# Patient Record
Sex: FEMALE | Race: WHITE | Employment: OTHER | URBAN - METROPOLITAN AREA
[De-identification: names, ages, dates, MRNs, and addresses within clinical notes are randomized per-mention and may not be internally consistent; named-entity substitution may affect disease eponyms.]

---

## 2017-07-17 ENCOUNTER — TRANSFERRED RECORDS (OUTPATIENT)
Dept: HEALTH INFORMATION MANAGEMENT | Facility: CLINIC | Age: 77
End: 2017-07-17

## 2017-10-24 ENCOUNTER — DOCUMENTATION ONLY (OUTPATIENT)
Dept: OTHER | Facility: CLINIC | Age: 77
End: 2017-10-24

## 2017-10-24 PROBLEM — Z71.89 ADVANCED DIRECTIVES, COUNSELING/DISCUSSION: Chronic | Status: ACTIVE | Noted: 2017-10-24

## 2017-11-06 DIAGNOSIS — R60.0 BILATERAL EDEMA OF LOWER EXTREMITY: ICD-10-CM

## 2017-11-06 RX ORDER — HYDROCHLOROTHIAZIDE 50 MG/1
50 TABLET ORAL DAILY
Qty: 90 TABLET | Refills: 0 | Status: SHIPPED | OUTPATIENT
Start: 2017-11-06 | End: 2017-11-20

## 2017-11-06 NOTE — TELEPHONE ENCOUNTER
(Reason for Call:  Medication or medication refill:    Do you use a [facility]?  Name of the pharmacy and phone number for the current request:  [facility]    Name of the medication requested: Hydrochlorothiazide    Other request: has appt 11/20    Can we leave a detailed message on this number? YES    Phone number patient can be reached at: Home number on file [phone] (home)    Best Time: any    Call taken on 11/6/2017 at 10:01 AM by Rebeca Cortés

## 2017-11-06 NOTE — TELEPHONE ENCOUNTER
Next 5 appointments (look out 90 days)     Nov 20, 2017  9:40 AM CST   SHORT with Jazzy Santiago MD   [facility]    [address]  [facility] [id number] [phone]                Prescription approved per G Refill Protocol.

## 2017-11-20 ENCOUNTER — OFFICE VISIT (OUTPATIENT)
Dept: INTERNAL MEDICINE | Facility: CLINIC | Age: 77
End: 2017-11-20
Payer: COMMERCIAL

## 2017-11-20 VITALS
HEIGHT: 64 IN | DIASTOLIC BLOOD PRESSURE: 76 MMHG | OXYGEN SATURATION: 97 % | BODY MASS INDEX: 27.83 KG/M2 | TEMPERATURE: 98.2 F | HEART RATE: 89 BPM | WEIGHT: 163 LBS | SYSTOLIC BLOOD PRESSURE: 120 MMHG

## 2017-11-20 DIAGNOSIS — I10 ESSENTIAL HYPERTENSION, BENIGN: Primary | ICD-10-CM

## 2017-11-20 DIAGNOSIS — R63.5 WEIGHT GAIN: ICD-10-CM

## 2017-11-20 DIAGNOSIS — Z78.0 MENOPAUSE: ICD-10-CM

## 2017-11-20 DIAGNOSIS — R60.0 BILATERAL EDEMA OF LOWER EXTREMITY: ICD-10-CM

## 2017-11-20 DIAGNOSIS — Z13.6 CARDIOVASCULAR SCREENING; LDL GOAL LESS THAN 160: ICD-10-CM

## 2017-11-20 LAB
BASOPHILS # BLD AUTO: 0 10E9/L (ref 0–0.2)
BASOPHILS NFR BLD AUTO: 0.3 %
DIFFERENTIAL METHOD BLD: ABNORMAL
EOSINOPHIL # BLD AUTO: 0.2 10E9/L (ref 0–0.7)
EOSINOPHIL NFR BLD AUTO: 2.6 %
ERYTHROCYTE [DISTWIDTH] IN BLOOD BY AUTOMATED COUNT: 13.5 % (ref 10–15)
HCT VFR BLD AUTO: 43 % (ref 35–47)
HGB BLD-MCNC: 14.2 G/DL (ref 11.7–15.7)
LYMPHOCYTES # BLD AUTO: 2.2 10E9/L (ref 0.8–5.3)
LYMPHOCYTES NFR BLD AUTO: 33.6 %
MCH RBC QN AUTO: 33.2 PG (ref 26.5–33)
MCHC RBC AUTO-ENTMCNC: 33 G/DL (ref 31.5–36.5)
MCV RBC AUTO: 101 FL (ref 78–100)
MONOCYTES # BLD AUTO: 0.4 10E9/L (ref 0–1.3)
MONOCYTES NFR BLD AUTO: 6.8 %
NEUTROPHILS # BLD AUTO: 3.6 10E9/L (ref 1.6–8.3)
NEUTROPHILS NFR BLD AUTO: 56.7 %
PLATELET # BLD AUTO: 243 10E9/L (ref 150–450)
RBC # BLD AUTO: 4.28 10E12/L (ref 3.8–5.2)
WBC # BLD AUTO: 6.4 10E9/L (ref 4–11)

## 2017-11-20 PROCEDURE — 99213 OFFICE O/P EST LOW 20 MIN: CPT | Performed by: INTERNAL MEDICINE

## 2017-11-20 PROCEDURE — 80061 LIPID PANEL: CPT | Performed by: INTERNAL MEDICINE

## 2017-11-20 PROCEDURE — 80050 GENERAL HEALTH PANEL: CPT | Performed by: INTERNAL MEDICINE

## 2017-11-20 PROCEDURE — 36415 COLL VENOUS BLD VENIPUNCTURE: CPT | Performed by: INTERNAL MEDICINE

## 2017-11-20 RX ORDER — HYDROCHLOROTHIAZIDE 50 MG/1
50 TABLET ORAL DAILY
Qty: 90 TABLET | Refills: 4 | Status: SHIPPED | OUTPATIENT
Start: 2017-11-20 | End: 2019-02-05

## 2017-11-20 NOTE — NURSING NOTE
"Chief Complaint   Patient presents with     Hypertension     does PX with Ob/GYN     Recheck Medication       Initial /76 (BP Location: Left arm, Cuff Size: Adult Regular)  Pulse 89  Temp 98.2  F (36.8  C) (Oral)  Ht 5' 4.25\" (1.632 m)  Wt 163 lb (73.9 kg)  SpO2 97%  Breastfeeding? No  BMI 27.76 kg/m2 Estimated body mass index is 27.76 kg/(m^2) as calculated from the following:    Height as of this encounter: 5' 4.25\" (1.632 m).    Weight as of this encounter: 163 lb (73.9 kg).  Medication Reconciliation: complete   Nay Schmitt CMA      "

## 2017-11-20 NOTE — MR AVS SNAPSHOT
"              After Visit Summary   11/20/2017    Nubia Coronel    MRN: [id number]           Patient Information     Date Of Birth          1940        Visit Information        Provider Department      11/20/2017 9:40 AM Jazzy Santiago MD [facility]        Today's Diagnoses     Essential hypertension, benign    -  1    Bilateral edema of lower extremity        CARDIOVASCULAR SCREENING; LDL GOAL LESS THAN 160        Weight gain        Menopause           Follow-ups after your visit        Your next 10 appointments already scheduled     Dec 07, 2017  9:30 AM ROBERT ROSALES SCREENING BILATERAL W/ ALECIA with RHBCMA2   [facility]    [address] [facility], [address]  [facility] [id number] [phone]           Three-dimensional (3D) mammograms are available at [place] locations in [place], [facility], [facility], [facility], and [place]. -Health locations include [facility] in [place]. Benefits of 3D mammograms include: - Improved rate of cancer detection - Decreases your chance of having to go back for more tests, which means fewer: - \"False-positive\" results (This means that there is an abnormal area but it isn't cancer.) - Invasive testing procedures, such as a biopsy or surgery - Can provide clearer images of the breast if you have dense breast tissue. 3D mammography is an optional exam that anyone can have with a 2D mammogram. It doesn't replace or take the place of a 2D mammogram. 2D mammograms remain an effective screening test for all women.  Not all insurance companies cover the cost of a 3D mammogram. Check with your insurance.              Future tests that were ordered for you today     Open Future Orders        Priority Expected Expires Ordered    DX Hip/Pelvis/Spine Routine  11/20/2018 11/20/2017            Who to contact     If you have questions or need follow up " "information about today's clinic visit or your schedule please contact [facility] directly at [phone].  Normal or non-critical lab and imaging results will be communicated to you by MyChart, letter or phone within 4 business days after the clinic has received the results. If you do not hear from us within 7 days, please contact the clinic through [employer] or phone. If you have a critical or abnormal lab result, we will notify you by phone as soon as possible.  Submit refill requests through [employer] or call your pharmacy and they will forward the refill request to us. Please allow 3 business days for your refill to be completed.          Additional Information About Your Visit        Mood[employer] Information     [employer] gives you secure access to your electronic health record. If you see a primary care provider, you can also send messages to your care team and make appointments. If you have questions, please call your primary care clinic.  If you do not have a primary care provider, please call [phone] and they will assist you.        Care EveryWhere ID     This is your Care EveryWhere ID. This could be used by other organizations to access your [place] medical records  [id number]        Your Vitals Were     Pulse Temperature Height Pulse Oximetry Breastfeeding? BMI (Body Mass Index)    89 98.2  F (36.8  C) (Oral) 5' 4.25\" (1.632 m) 97% No 27.76 kg/m2       Blood Pressure from Last 3 Encounters:   11/20/17 120/76   11/15/16 122/58   10/10/16 114/68    Weight from Last 3 Encounters:   11/20/17 163 lb (73.9 kg)   11/15/16 148 lb 8 oz (67.4 kg)   10/10/16 154 lb (69.9 kg)              We Performed the Following     CBC with platelets differential     Comprehensive metabolic panel     Lipid panel reflex to direct LDL Fasting     TSH with free T4 reflex          Today's Medication Changes          These changes are accurate as of: 11/20/17 10:22 AM.  If you have any questions, ask your nurse or " doctor.               These medicines have changed or have updated prescriptions.        Dose/Directions    OMEGA-3 FATTY ACIDS PO   This may have changed:  Another medication with the same name was removed. Continue taking this medication, and follow the directions you see here.   Changed by:  Jazzy Santiago MD        Dose:  4000 mg   Take 4,000 mg by mouth 3 times daily   Refills:  0            Where to get your medicines      These medications were sent to [place] [address] [place] [facility] [address] [facility]  [address] [facility] [address]    Hours:  Tech issues with their phone system Phone:  [phone]     hydrochlorothiazide 50 MG tablet                Primary Care Provider Office Phone # Fax #    Jazzy Santiago [phone]       [address] [facility] [address]        Equal Access to Services     CASEY FIGUEROA : Hadii nannette zuniga hadasho Soomaali, waaxda luqadaha, qaybta kaalmada adeegyada, waxmartin iniguez . So [facility] [phone].    ATENCIÓN: Si habla español, tiene a platt disposición servicios gratuitos de asistencia lingüística. Ll[facility] [phone].    We comply with applicable federal civil rights laws and [place] laws. We do not discriminate on the basis of race, color, national origin, age, disability, sex, sexual orientation, or gender identity.            Thank you!     Thank you for choosing [facility]  for your care. Our goal is always to provide you with excellent care. Hearing back from our patients is one way we can continue to improve our services. Please take a few minutes to complete the written survey that you may receive in the mail after your visit with us. Thank you!             Your Updated Medication List - Protect others around you: Learn how to safely use, store and throw away your medicines at www.disposemymeds.org.          This list is accurate as of: 11/20/17 10:22 AM.  Always use your most  recent med list.                   Brand Name Dispense Instructions for use Diagnosis    ALEVE 220 MG tablet   Generic drug:  naproxen sodium     60    1 TABLET EVERY 12 HOURS AS NEEDED    Routine general medical examination at a health care facility, Screening for malignant neoplasm of the rectum, Myalgia and myositis, unspecified       Alpha-Lipoic Acid 300 MG Tabs      Take by mouth 3 times daily.        AMBIEN 5 MG tablet   Generic drug:  zolpidem      1 TABLET AT BEDTIME 1 tab later in night prn        cetirizine 10 MG tablet    zyrTEC     Take 10 mg by mouth daily        cholecalciferol 1000 UNIT tablet    vitamin D3     1 daily.    Preop general physical exam       Esomeprazole Magnesium 20 MG Tbec    NEXIUM 24HR    90 tablet    Take 1 capsule by mouth daily    Essential hypertension with goal blood pressure less than 130/85       FIORICET -40 MG per tablet   Generic drug:  butalbital-acetaminophen-caffeine     28    1 OR 2 TABLETS EVERY 4 HOURS AS NEEDED    Headache(784.0)       hydrochlorothiazide 50 MG tablet    HYDRODIURIL    90 tablet    Take 1 tablet (50 mg) by mouth daily    Bilateral edema of lower extremity       Levocarnitine 500 MG Caps      Take  by mouth. TID        Magnesium 400 MG Tabs      Take by mouth daily        OMEGA-3 FATTY ACIDS PO      Take 4,000 mg by mouth 3 times daily        QC WOMENS DAILY MULTIVITAMIN PO      Take by mouth 2 times daily        TOPAMAX 100 MG tablet   Generic drug:  topiramate      Take  by mouth. 25 mg am. 75 mg pm.        TUMS 500 OR      2 tabs q pm    Preop general physical exam       ULTRAM 50 MG tablet   Generic drug:  traMADol      1 tab bid

## 2017-11-20 NOTE — PROGRESS NOTES
"  SUBJECTIVE:   Nubia Coronel is a 76 year old female who   presents to clinic today for the following health issues:      Hypertension Follow-up & med check      Outpatient blood pressures are not being checked.    Low Salt Diet: low salt      Amount of exercise or physical activity: water aerobics and stretching daily    Problems taking medications regularly: No    Medication side effects: none    Diet: regular (no restrictions)      Fibromyalgia.  Exercises- water aerobics.  Takes tramadol twice per day. She takes vitamin D regularly.  She has been undergoing physical therapy since June/July.     Headaches. She has headaches 1-2 times per month. Takes topamax and takes fioricet as needed.       Problem list and histories reviewed & adjusted, as indicated.    Reviewed and updated as needed this visit by clinical staffTobacco  Allergies  Med Hx  Surg Hx  Fam Hx  Soc Hx      Reviewed and updated as needed this visit by Provider         ROS:  C: NEGATIVE for fever, chills, change in weight  R: NEGATIVE for significant cough or SOB  CV: NEGATIVE for chest pain, palpitations or peripheral edema  Neuro: POS headaches    OBJECTIVE:     /76 (BP Location: Left arm, Cuff Size: Adult Regular)  Pulse 89  Temp 98.2  F (36.8  C) (Oral)  Ht 5' 4.25\" (1.632 m)  Wt 163 lb (73.9 kg)  SpO2 97%  Breastfeeding? No  BMI 27.76 kg/m2  Body mass index is 27.76 kg/(m^2).  GENERAL: healthy, alert and no distress  RESP: lungs clear to auscultation - no rales, rhonchi or wheezes  CV: regular rate and rhythm, normal S1 S2, no S3 or S4, no murmur, click or rub      ASSESSMENT/PLAN:       (I10) Essential hypertension, benign  (primary encounter diagnosis)  Comment: at goal  Plan: Comprehensive metabolic panel, CBC with         platelets differential            (R60.0) Bilateral edema of lower extremity  Comment:   Plan: hydrochlorothiazide (HYDRODIURIL) 50 MG tablet,        Comprehensive metabolic panel        "     (Z13.6) CARDIOVASCULAR SCREENING; LDL GOAL LESS THAN 160  Comment:   Plan: Lipid panel reflex to direct LDL Fasting          (R63.5) Weight gain  Comment:  Plan: TSH with free T4 reflex            (Z78.0) Menopause  Comment:   Plan: DX Hip/Pelvis/Spine                Jazzy Santiago MD  [facility]

## 2017-11-21 LAB
ALBUMIN SERPL-MCNC: 3.7 G/DL (ref 3.4–5)
ALP SERPL-CCNC: 103 U/L (ref 40–150)
ALT SERPL W P-5'-P-CCNC: 115 U/L (ref 0–50)
ANION GAP SERPL CALCULATED.3IONS-SCNC: 6 MMOL/L (ref 3–14)
AST SERPL W P-5'-P-CCNC: 60 U/L (ref 0–45)
BILIRUB SERPL-MCNC: 0.4 MG/DL (ref 0.2–1.3)
BUN SERPL-MCNC: 21 MG/DL (ref 7–30)
CALCIUM SERPL-MCNC: 9.5 MG/DL (ref 8.5–10.1)
CHLORIDE SERPL-SCNC: 102 MMOL/L (ref 94–109)
CHOLEST SERPL-MCNC: 188 MG/DL
CO2 SERPL-SCNC: 31 MMOL/L (ref 20–32)
CREAT SERPL-MCNC: 0.74 MG/DL (ref 0.52–1.04)
GFR SERPL CREATININE-BSD FRML MDRD: 76 ML/MIN/1.7M2
GLUCOSE SERPL-MCNC: 148 MG/DL (ref 70–99)
HDLC SERPL-MCNC: 71 MG/DL
LDLC SERPL CALC-MCNC: 95 MG/DL
NONHDLC SERPL-MCNC: 117 MG/DL
POTASSIUM SERPL-SCNC: 3.6 MMOL/L (ref 3.4–5.3)
PROT SERPL-MCNC: 7.2 G/DL (ref 6.8–8.8)
SODIUM SERPL-SCNC: 139 MMOL/L (ref 133–144)
TRIGL SERPL-MCNC: 109 MG/DL
TSH SERPL DL<=0.005 MIU/L-ACNC: 0.96 MU/L (ref 0.4–4)

## 2017-11-25 ENCOUNTER — TELEPHONE (OUTPATIENT)
Dept: INTERNAL MEDICINE | Facility: CLINIC | Age: 77
End: 2017-11-25

## 2017-11-25 DIAGNOSIS — R79.89 ELEVATED LIVER FUNCTION TESTS: Primary | ICD-10-CM

## 2017-11-25 DIAGNOSIS — R73.09 ELEVATED GLUCOSE: ICD-10-CM

## 2017-11-26 NOTE — TELEPHONE ENCOUNTER
Please call the patient to let her know that I would like the liver function tests repeated, as this was elevated.    Also would like to check for diabetes with a hemoglobin A1c.    Please have her schedule for repeat labs over the next 1-2 weeks.

## 2017-11-27 NOTE — TELEPHONE ENCOUNTER
Contacted pt, informed her of elevated LFT and glucose. Needs to recheck LFT and have testing done for diabetes. Lab only appt scheduled for 12/12/17.

## 2017-12-07 ENCOUNTER — HOSPITAL ENCOUNTER (OUTPATIENT)
Dept: MAMMOGRAPHY | Facility: CLINIC | Age: 77
Discharge: HOME | End: 2017-12-07
Attending: OBSTETRICS & GYNECOLOGY | Admitting: OBSTETRICS & GYNECOLOGY
Payer: MEDICARE

## 2017-12-07 DIAGNOSIS — Z12.31 VISIT FOR SCREENING MAMMOGRAM: ICD-10-CM

## 2017-12-07 PROCEDURE — G0202 SCR MAMMO BI INCL CAD: HCPCS

## 2017-12-12 ENCOUNTER — RADIANT APPOINTMENT (OUTPATIENT)
Dept: BONE DENSITY | Facility: CLINIC | Age: 77
End: 2017-12-12
Payer: COMMERCIAL

## 2017-12-12 PROCEDURE — 77080 DXA BONE DENSITY AXIAL: CPT | Performed by: INTERNAL MEDICINE

## 2017-12-14 DIAGNOSIS — R79.89 ELEVATED LIVER FUNCTION TESTS: ICD-10-CM

## 2017-12-14 DIAGNOSIS — R73.09 ELEVATED GLUCOSE: ICD-10-CM

## 2017-12-14 LAB
ALT SERPL W P-5'-P-CCNC: 119 U/L (ref 0–50)
AST SERPL W P-5'-P-CCNC: 64 U/L (ref 0–45)
HBA1C MFR BLD: 6.2 % (ref 4.3–6)

## 2017-12-14 PROCEDURE — 84450 TRANSFERASE (AST) (SGOT): CPT | Performed by: INTERNAL MEDICINE

## 2017-12-14 PROCEDURE — 83036 HEMOGLOBIN GLYCOSYLATED A1C: CPT | Performed by: INTERNAL MEDICINE

## 2017-12-14 PROCEDURE — 84460 ALANINE AMINO (ALT) (SGPT): CPT | Performed by: INTERNAL MEDICINE

## 2017-12-14 PROCEDURE — 36415 COLL VENOUS BLD VENIPUNCTURE: CPT | Performed by: INTERNAL MEDICINE

## 2017-12-31 ENCOUNTER — TELEPHONE (OUTPATIENT)
Dept: INTERNAL MEDICINE | Facility: CLINIC | Age: 77
End: 2017-12-31

## 2017-12-31 DIAGNOSIS — R73.9 HYPERGLYCEMIA: ICD-10-CM

## 2017-12-31 DIAGNOSIS — R79.89 ELEVATED LIVER FUNCTION TESTS: Primary | ICD-10-CM

## 2017-12-31 NOTE — LETTER
[place] [address]  [facility] [address]  [phone]        November 29, 2018    Nubia Coronel  [address] [facility] [address]              Dear Nubia Coronel    This is to remind you that your NON-FASTING LAB is due.    You may call our office at [phone] to schedule an appointment.    Please disregard this notice if you have already had your labs drawn or made an appointment.        Sincerely,        Jazzy Santiago MD

## 2018-01-01 NOTE — TELEPHONE ENCOUNTER
The liver function tests are still elevated.  Recommend obtaining an ultrasound of the liver.  Also recommend assessing for hepatitis C- lab only appt

## 2018-01-16 NOTE — TELEPHONE ENCOUNTER
Patient's home number message on machine to call back.  Patient's cell number message on machine to call back.

## 2018-01-17 NOTE — TELEPHONE ENCOUNTER
Pt calls back, states she is currently in [place], [place] until May. Reports she doesn't have a PCP in [place]. Indicates many of the providers down in [place] avoid seasonal patients unless urgent.     Please advise, thanks.    Please note that pt needs call back on cell number as home number is back her in [place].

## 2018-01-18 NOTE — TELEPHONE ENCOUNTER
Would she be able to find out from insurance if she could just get labs and have them faxed to me

## 2018-01-19 NOTE — TELEPHONE ENCOUNTER
Patient would like us to mail her lab orders to her [place] address at: [address] [place] [address].  Just Hep C.?   She will contact us for lab results if she does not hear back within 7-10 days of blood draw.  She agrees to follow up with liver US in May when she returns to [place] or sooner in [place] if needed.   Patient was concerned LFT's may be elevated due to Tylenol use for HA's. Was drinking maybe one glass of wine per day.  Currently not drinking or taking Tylenol and agrees to abstain from both.

## 2018-05-14 ENCOUNTER — TELEPHONE (OUTPATIENT)
Dept: INTERNAL MEDICINE | Facility: CLINIC | Age: 78
End: 2018-05-14

## 2018-05-14 NOTE — LETTER
[facility]  [address], [address]  [place], [place]  [address]                                            TEL:[phone]  FAX:[phone]        May 14, 2018    Attn: [facility] Medical Records  Re: Nubia Coronel, 1940    Please send lab results for Hemoglobin A1c, Hepatic Panel and Hepatitis C screen STAT via fax  to [phone], Attn: OLGA Thornton.     Labs were ordered by Dr. Jazzy Santiago and carried by patient to the lab. Please notify our office if Nubia did not have labs drawn at your Healthcare facility.     Sincerely,        Hillary Caputo RN   For Jazzy Santiago M.D.

## 2018-05-14 NOTE — TELEPHONE ENCOUNTER
Patient calls, Dr. Santiago ordered some labs for her to have drawn while in [place] and was to have another test when she got back. Patient also never heard from our office regarding the labs she had drawn. Patient contacted [facility] [phone] for the labs and they directed her to another location for them, patient not sure if the lab was part of their healthcare group or not. This RN will call [facility] to find out if they have lab results or where they would have directed the patient to. Patient will call back if she finds the name/phone number of the lab she went to.    Per 12/31/17 telephone encounter, Dr. Santiago wanted her to have labs and liver u/s done. Patient was already in [place] and planned to do the liver u/s when she returned. Call transferred to Radiology scheduling to make an appointment.    Contacted [facility] Medical Records Dept and spoke to Chelsy, they do not send lab results out without a GUILLE and cannot confirm if the patient had labs drawn there. Since we are a doctor's office we can send a STAT request for records to them for this. Fax: [phone]. Letter sent to [facility] Medical Records requesting they send results to us STAT and call if patient did not have labs drawn at their healthcare facility.

## 2018-05-17 ENCOUNTER — HOSPITAL ENCOUNTER (OUTPATIENT)
Dept: ULTRASOUND IMAGING | Facility: CLINIC | Age: 78
Discharge: HOME | End: 2018-05-17
Attending: INTERNAL MEDICINE | Admitting: INTERNAL MEDICINE
Payer: MEDICARE

## 2018-05-17 DIAGNOSIS — R79.89 ELEVATED LIVER FUNCTION TESTS: ICD-10-CM

## 2018-05-17 PROCEDURE — 76700 US EXAM ABDOM COMPLETE: CPT

## 2018-07-02 ENCOUNTER — TRANSFERRED RECORDS (OUTPATIENT)
Dept: HEALTH INFORMATION MANAGEMENT | Facility: CLINIC | Age: 78
End: 2018-07-02

## 2018-07-25 ENCOUNTER — OFFICE VISIT (OUTPATIENT)
Dept: INTERNAL MEDICINE | Facility: CLINIC | Age: 78
End: 2018-07-25
Payer: COMMERCIAL

## 2018-07-25 VITALS
BODY MASS INDEX: 26.98 KG/M2 | OXYGEN SATURATION: 95 % | HEART RATE: 82 BPM | TEMPERATURE: 98.7 F | DIASTOLIC BLOOD PRESSURE: 64 MMHG | WEIGHT: 158 LBS | SYSTOLIC BLOOD PRESSURE: 116 MMHG | HEIGHT: 64 IN | RESPIRATION RATE: 14 BRPM

## 2018-07-25 DIAGNOSIS — M79.7 FIBROMYALGIA: ICD-10-CM

## 2018-07-25 DIAGNOSIS — R79.89 ELEVATED LIVER FUNCTION TESTS: Primary | ICD-10-CM

## 2018-07-25 PROCEDURE — 36415 COLL VENOUS BLD VENIPUNCTURE: CPT | Performed by: INTERNAL MEDICINE

## 2018-07-25 PROCEDURE — 80076 HEPATIC FUNCTION PANEL: CPT | Performed by: INTERNAL MEDICINE

## 2018-07-25 PROCEDURE — 99213 OFFICE O/P EST LOW 20 MIN: CPT | Performed by: INTERNAL MEDICINE

## 2018-07-25 NOTE — MR AVS SNAPSHOT
"              After Visit Summary   7/25/2018    Nubia Coronel    MRN: [id number]           Patient Information     Date Of Birth          1940        Visit Information        Provider Department      7/25/2018 10:20 AM Jazzy Santiago MD [facility]        Today's Diagnoses     Elevated liver function tests    -  1    Fibromyalgia          Care Instructions    magnesium          Follow-ups after your visit        Who to contact     If you have questions or need follow up information about today's clinic visit or your schedule please contact [facility] directly at [phone].  Normal or non-critical lab and imaging results will be communicated to you by [employer], letter or phone within 4 business days after the clinic has received the results. If you do not hear from us within 7 days, please contact the clinic through [employer] or phone. If you have a critical or abnormal lab result, we will notify you by phone as soon as possible.  Submit refill requests through [employer] or call your pharmacy and they will forward the refill request to us. Please allow 3 business days for your refill to be completed.          Additional Information About Your Visit        MyChart Information     [employer] gives you secure access to your electronic health record. If you see a primary care provider, you can also send messages to your care team and make appointments. If you have questions, please call your primary care clinic.  If you do not have a primary care provider, please call [phone] and they will assist you.        Care EveryWhere ID     This is your Care EveryWhere ID. This could be used by other organizations to access your [place] medical records  [id number]        Your Vitals Were     Pulse Temperature Respirations Height Pulse Oximetry BMI (Body Mass Index)    82 98.7  F (37.1  C) (Oral) 14 5' 4.25\" (1.632 m) 95% 26.91 kg/m2       Blood Pressure from Last 3 " Encounters:   07/25/18 116/64   11/20/17 120/76   11/15/16 122/58    Weight from Last 3 Encounters:   07/25/18 158 lb (71.7 kg)   11/20/17 163 lb (73.9 kg)   11/15/16 148 lb 8 oz (67.4 kg)              We Performed the Following     Hepatic panel (Albumin, ALT, AST, Bili, Alk Phos, TP)          Today's Medication Changes          These changes are accurate as of 7/25/18 12:19 PM.  If you have any questions, ask your nurse or doctor.               Stop taking these medicines if you haven't already. Please contact your care team if you have questions.     ALEVE 220 MG tablet   Generic drug:  naproxen sodium   Stopped by:  Jazzy Santiago MD           AMBIEN 5 MG tablet   Generic drug:  zolpidem   Stopped by:  Jazzy Santiago MD                    Primary Care Provider Office Phone # Fax #    Jazzy Santiago [phone]       [address] [place] [address]        Equal Access to Services     [facility]: Hadii aad ku hadasho Soomaali, waaxda luqadaha, qaybta kaalmada adeegyada, waxay idiin hayaan juan ramon iniguez . So [facility] [phone].    ATENCIÓN: Si habla español, tiene a platt disposición servicios gratuitos de asistencia lingüística. Llame al [phone].    We comply with applicable federal civil rights laws and [place] laws. We do not discriminate on the basis of race, color, national origin, age, disability, sex, sexual orientation, or gender identity.            Thank you!     Thank you for choosing [facility]  for your care. Our goal is always to provide you with excellent care. Hearing back from our patients is one way we can continue to improve our services. Please take a few minutes to complete the written survey that you may receive in the mail after your visit with us. Thank you!             Your Updated Medication List - Protect others around you: Learn how to safely use, store and throw away your medicines at www.disposemymeds.org.           This list is accurate as of 7/25/18 12:19 PM.  Always use your most recent med list.                   Brand Name Dispense Instructions for use Diagnosis    Alpha-Lipoic Acid 300 MG Tabs      Take by mouth 3 times daily.        cetirizine 10 MG tablet    zyrTEC     Take 10 mg by mouth daily        cholecalciferol 1000 UNIT tablet    vitamin D3     1 daily.    Preop general physical exam       Esomeprazole Magnesium 20 MG Tbec    NEXIUM 24HR    90 tablet    Take 1 capsule by mouth daily    Essential hypertension with goal blood pressure less than 130/85       hydrochlorothiazide 50 MG tablet    HYDRODIURIL    90 tablet    Take 1 tablet (50 mg) by mouth daily    Bilateral edema of lower extremity       Levocarnitine 500 MG Caps      Take  by mouth. TID        Magnesium 400 MG Tabs      Take by mouth daily        QC WOMENS DAILY MULTIVITAMIN PO      Take by mouth 2 times daily        TOPAMAX 100 MG tablet   Generic drug:  topiramate      Take  by mouth. 25 mg am. 75 mg pm.        TUMS 500 OR      2 tabs q pm    Preop general physical exam       ULTRAM 50 MG tablet   Generic drug:  traMADol      1 tab bid

## 2018-07-25 NOTE — PROGRESS NOTES
".  SUBJECTIVE:   Nubia Coronel is a 77 year old female who presents to clinic today for the following health issues:      The patient had an elevated ALT and AST.  Ultrasound of the abdomen revealed a fatty liver.    The patient had seen her rehab specialist.    She has weaned off of fioricet since this contains tylenol.  She is decreasing ultram    She is no longer on ambien.   She has not been drinking any alcohol.      Fibromyalgia.  She is undergoing physical therapy.  She reports her fibromyalgia has been stable.     Insomnia. Some nights she only sleeps for 3 hours. Her sleeping is improving over the past couple of nights.    She is trying lemon tea.          Problem list and histories reviewed & adjusted, as indicated.      Reviewed and updated as needed this visit by clinical staff  Tobacco  Med Hx  Surg Hx  Fam Hx  Soc Hx      Reviewed and updated as needed this visit by Provider         ROS:  CONSTITUTIONAL: NEGATIVE for fever, chills, change in weight  RESP: NEGATIVE for significant cough or SOB  CV: NEGATIVE for chest pain, palpitations or peripheral edema    OBJECTIVE:     /64  Pulse 82  Temp 98.7  F (37.1  C) (Oral)  Resp 14  Ht 5' 4.25\" (1.632 m)  Wt 158 lb (71.7 kg)  SpO2 95%  BMI 26.91 kg/m2  Body mass index is 26.91 kg/(m^2).  GENERAL: healthy, alert and no distress  RESP: lungs clear to auscultation - no rales, rhonchi or wheezes  CV: regular rate and rhythm, normal S1 S2, no S3 or S4, no murmur, click or rub        ASSESSMENT/PLAN:       (R79.89) Elevated liver function tests  (primary encounter diagnosis)  Comment: assess  Plan: Hepatic panel (Albumin, ALT, AST, Bili, Alk         Phos, TP)        -obtain records also    (M79.7) Fibromyalgia  Comment: stable  Plan: monitor    Insomnia. Patient plans on trying magnesium and lemon tea.    Jazzy Santiago MD  [facility]    "

## 2018-07-26 LAB
ALBUMIN SERPL-MCNC: 3.8 G/DL (ref 3.4–5)
ALP SERPL-CCNC: 92 U/L (ref 40–150)
ALT SERPL W P-5'-P-CCNC: 62 U/L (ref 0–50)
AST SERPL W P-5'-P-CCNC: 34 U/L (ref 0–45)
BILIRUB DIRECT SERPL-MCNC: 0.1 MG/DL (ref 0–0.2)
BILIRUB SERPL-MCNC: 0.4 MG/DL (ref 0.2–1.3)
PROT SERPL-MCNC: 7.3 G/DL (ref 6.8–8.8)

## 2018-08-14 ENCOUNTER — TELEPHONE (OUTPATIENT)
Dept: INTERNAL MEDICINE | Facility: CLINIC | Age: 78
End: 2018-08-14

## 2018-08-14 NOTE — TELEPHONE ENCOUNTER
[facility] asking if [facility]CP will take over controlled meds  PCP's in-basket  Fax back

## 2018-09-05 ENCOUNTER — TELEPHONE (OUTPATIENT)
Dept: INTERNAL MEDICINE | Facility: CLINIC | Age: 78
End: 2018-09-05

## 2018-09-05 DIAGNOSIS — R74.8 ELEVATED LIVER ENZYMES: Primary | ICD-10-CM

## 2018-09-11 DIAGNOSIS — R74.8 ELEVATED LIVER ENZYMES: ICD-10-CM

## 2018-09-11 LAB
ALBUMIN SERPL-MCNC: 3.8 G/DL (ref 3.4–5)
ALP SERPL-CCNC: 92 U/L (ref 40–150)
ALT SERPL W P-5'-P-CCNC: 40 U/L (ref 0–50)
AST SERPL W P-5'-P-CCNC: 27 U/L (ref 0–45)
BILIRUB DIRECT SERPL-MCNC: 0.1 MG/DL (ref 0–0.2)
BILIRUB SERPL-MCNC: 0.4 MG/DL (ref 0.2–1.3)
PROT SERPL-MCNC: 7.8 G/DL (ref 6.8–8.8)

## 2018-09-11 PROCEDURE — 36415 COLL VENOUS BLD VENIPUNCTURE: CPT | Performed by: INTERNAL MEDICINE

## 2018-09-11 PROCEDURE — 80076 HEPATIC FUNCTION PANEL: CPT | Performed by: INTERNAL MEDICINE

## 2018-11-14 DIAGNOSIS — M79.7 FIBROMYALGIA: Primary | ICD-10-CM

## 2018-11-14 RX ORDER — TRAMADOL HYDROCHLORIDE 50 MG/1
TABLET ORAL
Qty: 60 TABLET | Refills: 0 | Status: SHIPPED | OUTPATIENT
Start: 2019-01-10 | End: 2019-01-15

## 2018-11-14 RX ORDER — TRAMADOL HYDROCHLORIDE 50 MG/1
TABLET ORAL
Qty: 60 TABLET | Refills: 0 | Status: SHIPPED | OUTPATIENT
Start: 2018-11-14 | End: 2018-11-14

## 2018-11-14 RX ORDER — TRAMADOL HYDROCHLORIDE 50 MG/1
TABLET ORAL
Qty: 60 TABLET | Refills: 0 | Status: SHIPPED | OUTPATIENT
Start: 2018-12-13 | End: 2018-11-14

## 2018-11-14 NOTE — TELEPHONE ENCOUNTER
3 months worth given    RN to check physician prescription monitoring program    Script ready and in my outbox    thanks

## 2018-11-14 NOTE — TELEPHONE ENCOUNTER
Pt calls for refill of Tramadol. This was prescribed by previous provider.     Confirmed directions, dose.     She takes 3 extra a week in the PM because she has exercise classes.

## 2018-11-15 NOTE — TELEPHONE ENCOUNTER
[place]:  reviewed- no concerns    [place]  [email or web address]     Prescriptions faxed.

## 2018-12-14 ENCOUNTER — HOSPITAL ENCOUNTER (OUTPATIENT)
Dept: MAMMOGRAPHY | Facility: CLINIC | Age: 78
Discharge: HOME | End: 2018-12-14
Attending: OBSTETRICS & GYNECOLOGY | Admitting: OBSTETRICS & GYNECOLOGY
Payer: MEDICARE

## 2018-12-14 DIAGNOSIS — R73.9 HYPERGLYCEMIA: ICD-10-CM

## 2018-12-14 DIAGNOSIS — Z12.31 VISIT FOR SCREENING MAMMOGRAM: ICD-10-CM

## 2018-12-14 DIAGNOSIS — R79.89 ELEVATED LIVER FUNCTION TESTS: ICD-10-CM

## 2018-12-14 LAB — HBA1C MFR BLD: 6.1 % (ref 0–5.6)

## 2018-12-14 PROCEDURE — 86803 HEPATITIS C AB TEST: CPT | Performed by: INTERNAL MEDICINE

## 2018-12-14 PROCEDURE — 77067 SCR MAMMO BI INCL CAD: CPT

## 2018-12-14 PROCEDURE — 83036 HEMOGLOBIN GLYCOSYLATED A1C: CPT | Performed by: INTERNAL MEDICINE

## 2018-12-14 PROCEDURE — 36415 COLL VENOUS BLD VENIPUNCTURE: CPT | Performed by: INTERNAL MEDICINE

## 2018-12-15 LAB — HCV AB SERPL QL IA: NONREACTIVE

## 2018-12-18 ENCOUNTER — OFFICE VISIT (OUTPATIENT)
Dept: INTERNAL MEDICINE | Facility: CLINIC | Age: 78
End: 2018-12-18
Payer: COMMERCIAL

## 2018-12-18 VITALS
RESPIRATION RATE: 16 BRPM | HEIGHT: 64 IN | BODY MASS INDEX: 26.43 KG/M2 | OXYGEN SATURATION: 93 % | WEIGHT: 154.8 LBS | TEMPERATURE: 98.3 F | HEART RATE: 94 BPM | SYSTOLIC BLOOD PRESSURE: 120 MMHG | DIASTOLIC BLOOD PRESSURE: 71 MMHG

## 2018-12-18 DIAGNOSIS — M79.7 FIBROMYALGIA: ICD-10-CM

## 2018-12-18 DIAGNOSIS — G43.709 CHRONIC MIGRAINE WITHOUT AURA WITHOUT STATUS MIGRAINOSUS, NOT INTRACTABLE: Primary | ICD-10-CM

## 2018-12-18 PROCEDURE — 99214 OFFICE O/P EST MOD 30 MIN: CPT | Performed by: INTERNAL MEDICINE

## 2018-12-18 RX ORDER — BUTALBITAL, ASPIRIN, AND CAFFEINE 325; 50; 40 MG/1; MG/1; MG/1
1 CAPSULE ORAL EVERY 4 HOURS PRN
Qty: 60 CAPSULE | Refills: 0 | Status: SHIPPED | OUTPATIENT
Start: 2018-12-18 | End: 2019-06-04 | Stop reason: ALTCHOICE

## 2018-12-18 ASSESSMENT — MIFFLIN-ST. JEOR: SCORE: 1171.14

## 2018-12-18 NOTE — PROGRESS NOTES
ASSESSMENT/PLAN:     1. Chronic migraine without aura without status migrainosus, not intractable    Patient believes overall migraines controlled on topamax but feels occasionally she needs something for abating the headache.  She wants to avoid agents with tylenol.    She had a lot of benefit from fiorcet however this was with tylenol, so will prescribe alternate of fiorinal.    Instructed she should really only use when needed as it also has NSAID       - butalbital-aspirin-caffeine (FIORINAL) -40 MG capsule; Take 1 capsule by mouth every 4 hours as needed for headaches  Dispense: 60 capsule; Refill: 0    2. Fibromyalgia  Continues on supplements and tramodol for pain control      Negrita Seth MD  [facility]'    SUBJECTIVE:   Nubia Coronel is a 78 year old female who presents to clinic today for the following health issues:    She is here for a med check and to go over her medications.    Had a transient rise in her liver enzymes over the summer, hep C screen negative. She was on higher doses of fiorcet which contained tylenol and since she weaned off this her liver enzymes normal.   Now she is on topamax for the migraines and feels this is helping greatly. She still feels she needs something for breakthrough    Fibromyalgia:  She is undergoing physical therapy, she remains on tramadol. On alpholipoidacid, levocarnitine    Her lab work indicates she has prediabetes.     Problem list and histories reviewed & adjusted, as indicated.  Additional history: as documented    Patient Active Problem List   Diagnosis     Headache     Chronic rhinitis     Myalgia and myositis     Renal calculus     CARDIOVASCULAR SCREENING; LDL GOAL LESS THAN 160     GERD (gastroesophageal reflux disease)     Bilateral lower extremity edema     Essential hypertension, benign     Kidney stone     Fibromyalgia     Advance Care Planning     Past Surgical History:   Procedure Laterality Date      APPENDECTOMY       C NONSPECIFIC PROCEDURE      Negative MRI of the left shoulder     C NONSPECIFIC PROCEDURE      Normal bone density scan     C NONSPECIFIC PROCEDURE      Normal pap and mammos     C NONSPECIFIC PROCEDURE      Negative brain MRI     C NONSPECIFIC PROCEDURE      Negative liver biopy     C NONSPECIFIC PROCEDURE      S/P cholecystectomy     C NONSPECIFIC PROCEDURE      S/P hysterectomy     C NONSPECIFIC PROCEDURE      S/P appendectomy     C NONSPECIFIC PROCEDURE      Negative breast biopsy     CHOLECYSTECTOMY       COLONOSCOPY  2012    Procedure:COLONOSCOPY; COLONOSCOPY; Surgeon:ELIZABETH SINGH; Location: GI     GYN SURGERY      hysterectomy     HEAD & NECK SURGERY      sinus surgery     ORTHOPEDIC SURGERY      bunions       Social History     Tobacco Use     Smoking status: Former Smoker     Last attempt to quit: 10/1/1981     Years since quittin.2     Smokeless tobacco: Never Used   Substance Use Topics     Alcohol use: Yes     Comment: social     Family History   Problem Relation Age of Onset     C.A.D. Father           73 yo MI     Family History Negative Mother         born 191     Respiratory Brother          68 yo Emphysema (smoker)     Diabetes Brother      C.A.D. Brother         pacemaker     Blood Disease Sister         Factor 2 - blood clots,allergies     Family History Negative Son      Family History Negative Son      Family History Negative Son            Reviewed and updated as needed this visit by clinical staff       Reviewed and updated as needed this visit by Provider         ROS:  Constitutional, HEENT, cardiovascular, pulmonary, gi and gu systems are negative, except as otherwise noted.    OBJECTIVE:     There were no vitals taken for this visit.  There is no height or weight on file to calculate BMI.  GENERAL: healthy, alert and no distress  RESP: lungs clear to auscultation - no rales, rhonchi or wheezes  CV: regular rate and rhythm,  normal S1 S2, no S3 or S4, no murmur  MS: no gross musculoskeletal defects noted, no edema  NEURO: Normal strength and tone, mentation intact and speech normal    Diagnostic Test Results:  none

## 2019-01-14 DIAGNOSIS — M79.7 FIBROMYALGIA: ICD-10-CM

## 2019-01-14 DIAGNOSIS — G43.709 CHRONIC MIGRAINE WITHOUT AURA WITHOUT STATUS MIGRAINOSUS, NOT INTRACTABLE: ICD-10-CM

## 2019-01-14 DIAGNOSIS — R60.0 BILATERAL EDEMA OF LOWER EXTREMITY: ICD-10-CM

## 2019-01-14 NOTE — TELEPHONE ENCOUNTER
Reason for Call:  Other call back and prescription    Detailed comments: Pt calling to inform provider of her insurance comp change for her rx. Please call [phone] [facility]    Phone Number Patient can be reached [phone] (cell)    Best Time: any    Can we leave a detailed message on this number? YES    Call taken on 1/14/2019 at 12:01 PM by Aminata Lomas

## 2019-01-15 RX ORDER — TRAMADOL HYDROCHLORIDE 50 MG/1
TABLET ORAL
Qty: 60 TABLET | Refills: 0 | Status: SHIPPED | OUTPATIENT
Start: 2019-01-15 | End: 2019-01-29

## 2019-01-15 NOTE — TELEPHONE ENCOUNTER
Contacted patient to clarify request. She needs refill for Tramadol sent to [facility] instead of [facility].     Requested Prescriptions   Pending Prescriptions Disp Refills     traMADol (ULTRAM) 50 MG tablet  Last Written Prescription Date:  1/10/19  Last Fill Quantity: 60,  # refills: 0   Last office visit: 12/18/2018 with prescribing provider:  Rolo Betancur Office Visit:    60 tablet 0     Sig: Take one tablet daily in AM, and then one tablet on Tuesday, Thursday, Saturday in PM.    There is no refill protocol information for this order      Pt does not have signed CSA on file.    RX [place] ([place] reviewed:  reviewed- no concerns    [place]:  [email or web address]     Routing refill request to provider for review/approval because:  Drug not on the [facility] refill protocol

## 2019-01-29 RX ORDER — TRAMADOL HYDROCHLORIDE 50 MG/1
TABLET ORAL
Qty: 60 TABLET | Refills: 0 | Status: SHIPPED | OUTPATIENT
Start: 2019-01-29 | End: 2019-03-13

## 2019-01-29 NOTE — TELEPHONE ENCOUNTER
Pt is calling stating [facility] only filled this for 1 wk.  Pt says [facility] needs a new rx for 60 tabs with a new/current date.  Pt is almost out and needs this filled asap.  Pls call [phone] to grace.

## 2019-02-03 ENCOUNTER — TRANSFERRED RECORDS (OUTPATIENT)
Dept: HEALTH INFORMATION MANAGEMENT | Facility: CLINIC | Age: 79
End: 2019-02-03

## 2019-02-05 ENCOUNTER — TRANSFERRED RECORDS (OUTPATIENT)
Dept: HEALTH INFORMATION MANAGEMENT | Facility: CLINIC | Age: 79
End: 2019-02-05

## 2019-02-05 DIAGNOSIS — R60.0 BILATERAL EDEMA OF LOWER EXTREMITY: ICD-10-CM

## 2019-02-07 RX ORDER — HYDROCHLOROTHIAZIDE 50 MG/1
TABLET ORAL
Qty: 90 TABLET | Refills: 2 | Status: SHIPPED | OUTPATIENT
Start: 2019-02-07 | End: 2019-02-28

## 2019-02-07 NOTE — TELEPHONE ENCOUNTER
"Routing refill request to provider for review/approval because:  Labs not current:  Potassium, sodium and creatinine          Requested Prescriptions   Pending Prescriptions Disp Refills     hydrochlorothiazide (HYDRODIURIL) 50 MG tablet [Pharmacy Med Name: HYDROCHLOROTHIAZIDE 50 MG TAB] 90 tablet 2     Sig: TAKE 1 TABLET BY MOUTH DAILY    Diuretics (Including Combos) Protocol Failed - 2/5/2019  3:43 PM       Failed - Normal serum creatinine on file in past 12 months    Recent Labs   Lab Test 11/20/17  1035   CR 0.74             Failed - Normal serum potassium on file in past 12 months    Recent Labs   Lab Test 11/20/17  1035   POTASSIUM 3.6                   Failed - Normal serum sodium on file in past 12 months    Recent Labs   Lab Test 11/20/17  1035                Passed - Blood pressure under 140/90 in past 12 months    BP Readings from Last 3 Encounters:   12/18/18 120/71   07/25/18 116/64   11/20/17 120/76                Passed - Recent (12 mo) or future (30 days) visit within the authorizing provider's specialty    Patient had office visit in the last 12 months or has a visit in the next 30 days with authorizing provider or within the authorizing provider's specialty.  See \"Patient Info\" tab in inbasket, or \"Choose Columns\" in Meds & Orders section of the refill encounter.             Passed - Medication is active on med list       Passed - Patient is age 18 or older       Passed - No active pregancy on record       Passed - No positive pregnancy test in past 12 months          "

## 2019-02-27 ENCOUNTER — TELEPHONE (OUTPATIENT)
Dept: INTERNAL MEDICINE | Facility: CLINIC | Age: 79
End: 2019-02-27

## 2019-02-27 DIAGNOSIS — R60.0 BILATERAL EDEMA OF LOWER EXTREMITY: ICD-10-CM

## 2019-02-27 NOTE — TELEPHONE ENCOUNTER
Reason for Call:  Medication or medication refill:    Do you use a [facility]?  Name of the pharmacy and phone number for the current request:  [facility] in [address] [place] [address], Phone number is [phone]    Name of the medication requested: hydrochlorothiazide, 90 tablets    Other request: patient is wintering in [place]    Can we leave a detailed message on this number? YES    Phone number patient can be reached at: Cell number on file:    Telephone Information:   Mobile [phone]       Best Time: any    Call taken on 2/27/2019 at 11:37 AM by Alberta Ferrer

## 2019-02-28 RX ORDER — HYDROCHLOROTHIAZIDE 50 MG/1
50 TABLET ORAL DAILY
Qty: 90 TABLET | Refills: 0 | Status: SHIPPED | OUTPATIENT
Start: 2019-02-28 | End: 2019-05-17

## 2019-02-28 NOTE — TELEPHONE ENCOUNTER
Patient has current prescription at [facility]. 90 day prescription sent to [facility] in [place].

## 2019-03-11 DIAGNOSIS — M79.7 FIBROMYALGIA: ICD-10-CM

## 2019-03-11 NOTE — TELEPHONE ENCOUNTER
Reason for Call:  Medication or medication refill:    Do you use a [facility]?  Name of the pharmacy and phone number for the current request:  [facility]    Name of the medication requested: Ultram-qty 90, topiramate 25mg-qty 360    Other request: none    Can we leave a detailed message on this number? YES    Phone number patient can be reached at: Other phone number:  [phone]  Pt is in [place] and wants these mailed there-pt stated [facility] knows this.  Best Time: any    Call taken on 3/11/2019 at 2:43 PM by Yue Riley

## 2019-03-12 NOTE — TELEPHONE ENCOUNTER
"Requested Prescriptions   Pending Prescriptions Disp Refills     topiramate (TOPAMAX) 25 MG tablet  Last Written Prescription Date:  historic  Last Fill Quantity: n/a,  # refills: n/a   Last office visit: 12/18/2018 with prescribing provider:  Rolo   Future Office Visit:    360 tablet      Sig: Take 1 tablet (25 mg) by mouth every morning AND 3 tablets (75 mg) every evening.    Anti-Seizure Meds Protocol  Failed - 3/12/2019  2:17 PM       Failed - Review Authorizing provider's last note.     Refer to last progress notes: confirm request is for original authorizing provider (cannot be through other providers).       Passed - Recent (12 mo) or future (30 days) visit within the authorizing provider's specialty    Patient had office visit in the last 12 months or has a visit in the next 30 days with authorizing provider or within the authorizing provider's specialty.  See \"Patient Info\" tab in inbasket, or \"Choose Columns\" in Meds & Orders section of the refill encounter.             Passed - Normal CBC on file in past 26 months    Recent Labs   Lab Test 11/20/17  1035   WBC 6.4   RBC 4.28   HGB 14.2   HCT 43.0                   Passed - Normal serum creatinine on file in past 26 months    Recent Labs   Lab Test 11/20/17  1035   CR 0.74            Passed - Normal ALT or AST on file in past 26 months    Recent Labs   Lab Test 09/11/18  0908   ALT 40     Recent Labs   Lab Test 09/11/18  0908   AST 27            Passed - Normal platelet count on file in past 26 months    Recent Labs   Lab Test 11/20/17  1035                 Passed - Medication is active on med list       Passed - No active pregnancy on record       Passed - No positive pregnancy test in last 12 months    Routing refill request to provider for review/approval because:  Medication is reported/historical  This has not been ordered by our office before             traMADol (ULTRAM) 50 MG tablet   Problem List Complete:  No     PROVIDER TO " CONSIDER COMPLETION OF PROBLEM LIST AND OVERVIEW/CONTROLLED SUBSTANCE AGREEMENT    Last Written Prescription Date:  1/19/19  Last Fill Quantity: 60,   # refills: 0    THE MOST RECENT OFFICE VISIT MUST BE WITHIN THE PAST 3 MONTHS. AT LEAST ONE FACE TO FACE VISIT MUST OCCUR EVERY 6 MONTHS. ADDITIONAL VISITS CAN BE VIRTUAL.  (THIS STATEMENT SHOULD BE DELETED.)    Last Office Visit with [facility] primary care provider: 12/18/18 Dr. Seth    Future Office visit:     Controlled substance agreement:   Encounter-Level CSA:    There are no encounter-level csa.     Patient-Level CSA:    There are no patient-level csa.         Last Urine Drug Screen: No results found for: CDAUT, No results found for: COMDAT, No results found for: THC13, PCP13, COC13, MAMP13, OPI13, AMP13, BZO13, TCA13, MTD13, BAR13, OXY13, PPX13, BUP13     Processing:  Fax Rx to [facility]     [email or web address]    RX monitoring program ([place]) reviewed:  reviewed- no concerns   60 tablet 0     Sig: Take one tablet daily in AM, and then one tablet on Tuesday, Thursday, Saturday in PM.    There is no refill protocol information for this order    Routing refill request to provider for review/approval because:  Drug not on the [facility] refill protocol

## 2019-03-13 RX ORDER — TOPIRAMATE 25 MG/1
TABLET, FILM COATED ORAL
Qty: 360 TABLET | Refills: 1 | Status: SHIPPED | OUTPATIENT
Start: 2019-03-13 | End: 2019-03-22

## 2019-03-13 RX ORDER — TRAMADOL HYDROCHLORIDE 50 MG/1
TABLET ORAL
Qty: 60 TABLET | Refills: 0 | Status: SHIPPED | OUTPATIENT
Start: 2019-03-13 | End: 2019-05-22

## 2019-03-19 ENCOUNTER — TELEPHONE (OUTPATIENT)
Dept: INTERNAL MEDICINE | Facility: CLINIC | Age: 79
End: 2019-03-19

## 2019-03-19 DIAGNOSIS — M79.7 FIBROMYALGIA: ICD-10-CM

## 2019-03-19 NOTE — TELEPHONE ENCOUNTER
Patient is calling,  is currently in [place]. Will be in town here from 4/4 to 4/10. Would like rx sent to [facility] in [place].(ph. [phone])  Patient received a denial of a pre-auth.  takes the topomax for for migraines. Takes 4 pills per day. 1 in the am and 3 in the pm. Has enough for 4 or 5 days left. Would like to discuss what she needs to do if her meds are denied or she doesn't have an answer yet by the time she needs them refilled. States she just cant stop them cold turkey.      Best call back is [phone], ok to leave a message.

## 2019-03-19 NOTE — LETTER
Letter of medical necessity    To whom it may concern:  Nubia Coronel is a patient under my care.  The patient has a history of migraines and takes topiramate for her migraines.    Please call with any questions.      Jazzy Santiago MD

## 2019-03-19 NOTE — TELEPHONE ENCOUNTER
Submitted PA via [facility], received message that PA was already submitted for this patient and drug which was denied. Case ID: [id number]. Status: denied.  Called insurance, Vivienne was person to start PA. Insurance stated that because a diagnosis code was not provided and the case was marked as urgent, the PA was denied. Medication is used for headaches, that should be the diagnosis listed and given to insurance. They will be re-sending denial letter so appeal process can be started.     Thanks,  Rosie

## 2019-03-19 NOTE — TELEPHONE ENCOUNTER
Topamax PA request sent to PA team today. Spoke to patient, advised her insurance likely has an issue with the quantity, but there is not a 75mg tablet that she can take in the evening. Will attempt to submit PA for this - additional information obtained from patient for PA and added to 3/19/19 telephone encounter for PA.     Advised patient to call back on Friday if she has not heard back from our office regarding PA request so we can address it before the weekend. If PA is approved we can send temporary refill to [facility] so she does not run out prior to getting supply from [facility].

## 2019-03-19 NOTE — TELEPHONE ENCOUNTER
PRIOR AUTHORIZATION DENIED    Medication: topiramate-Denied-    Denial Date: 3/19/2019    Denial Rational: Medication is only approved for diagnosis of migraine or seizures. This restriction has been approved by the Centers for Medicare and Medicaid Services. Coverage of the requested medication is provided under Medicare Part D when the FDA has approved the use of the requested medication for the diagnosis provided by your physician or if use of the requested medication is considered a Medicare Part D use of the drug (even though not FDA approved).            Appeal Information: If you would like to appeal this decision we would need a letter of medical necessity in order to begin the appeal process. The sooner the letter is written, and we are notified the letter is in the patient's chart, the sooner we can get the appeal initiated. Some appeals can take up to 30 days to be completed.

## 2019-03-19 NOTE — TELEPHONE ENCOUNTER
Patient called clinic today, she only has enough medication for about 5 days left. Obtained additional information for PA request, please expedite request.    Alternatives tried:  Antidepressants (per patient she has tried all of them) - made headaches worse  Imitrex - failure  Propranolol - failure  Maxalt - failure    She has taken Topiramate since 12/2002 for headaches after trying several medication alternatives through [facility]. She did attempt to decrease her evening dose at one point and headaches quickly returned. She has been maintained on this dose for many years with good results and no side effects.     *If PA is approved, patient will need 1 month supply of Topiramate sent to [facility] in [place].(ph. [phone] as mail order will take up to 10 business days to arrive.

## 2019-03-19 NOTE — TELEPHONE ENCOUNTER
Prior Authorization Retail Medication Request    Medication/Dose: topiramate  ICD code (if different than what is on RX):    Previously Tried and Failed:    Rationale:      Insurance Name:  [facility] [phone]  Insurance ID:  Not provided      Pharmacy Information (if different than what is on RX)  [facility]   Phone:  [phone]

## 2019-03-20 NOTE — TELEPHONE ENCOUNTER
Central Prior Authorization Team   Phone: [phone]    Medication Appeal Initiation    We have initiated an appeal for the requested medication:  Medication: topiramate-Denied-  Appeal Start Date:  3/20/2019  Insurance Company: [facility] - Phone [phone] Fax [phone]  Comments:       Appeal information has been faxed to [employer] at [phone]. Appeal has been sent as an urgent request to the insurance.

## 2019-03-20 NOTE — TELEPHONE ENCOUNTER
PA denied as Topiramate only covered for seizures and migraines. Per patient she does take for migraines, but diagnosis is listed as Fibromyalgia.     Will need to submit appeal for medication and Insurance will need letter of medical necessity from PCP for the appeal. Please complete letter and send encounter back to PA team to process the appeal.

## 2019-03-21 NOTE — TELEPHONE ENCOUNTER
MEDICATION APPEAL APPROVED    Medication: topiramate-Approved-  Authorization Effective Date: 1/1/2019  Authorization Expiration Date: 3/21/2022  Approved Dose/Quantity:   Reference #:     Insurance Company: [facility] - Phone [phone] Fax [phone]  Expected CoPay:       CoPay Card Available:      Foundation Assistance Needed:    Which Pharmacy is filling the prescription (Not needed for infusion/clinic administered): be[facility] - [facility], [address]

## 2019-03-22 DIAGNOSIS — M79.7 FIBROMYALGIA: ICD-10-CM

## 2019-03-22 RX ORDER — TOPIRAMATE 25 MG/1
TABLET, FILM COATED ORAL
Qty: 360 TABLET | Refills: 0 | OUTPATIENT
Start: 2019-03-22

## 2019-03-22 RX ORDER — TOPIRAMATE 25 MG/1
TABLET, FILM COATED ORAL
Qty: 120 TABLET | Refills: 0 | Status: SHIPPED | OUTPATIENT
Start: 2019-03-22 | End: 2019-10-31

## 2019-03-22 NOTE — TELEPHONE ENCOUNTER
Topamax appeal approved, patient will need temporary supply sent to [facility] in [place], [place].     30 day supply of Topamax sent to [facility] in [place], [place]. Left voice message for patient that Appeal was approved and 30 day supply sent to pharmacy to allow time for mail order delivery to arrive.

## 2019-03-22 NOTE — TELEPHONE ENCOUNTER
"Requested Prescriptions   Pending Prescriptions Disp Refills     topiramate (TOPAMAX) 25 MG tablet [Pharmacy Med Name: TOPIRAMATE 25MG TABLETS] 360 tablet 0    Last Written Prescription Date:  03/22/2019  Last Fill Quantity: 120,  # refills: 0   Last office visit: 12/18/2018 with prescribing provider:     Future Office Visit:   Sig: TAKE 1 TABLET BY MOUTH EVERY MORNING AND 3 TABLETS BY MOUTH EVERY EVENING    Anti-Seizure Meds Protocol  Failed - 3/22/2019  8:57 AM       Failed - Review Authorizing provider's last note.     Refer to last progress notes: confirm request is for original authorizing provider (cannot be through other providers).         Passed - Recent (12 mo) or future (30 days) visit within the authorizing provider's specialty    Patient had office visit in the last 12 months or has a visit in the next 30 days with authorizing provider or within the authorizing provider's specialty.  See \"Patient Info\" tab in inbasket, or \"Choose Columns\" in Meds & Orders section of the refill encounter.             Passed - Normal CBC on file in past 26 months    Recent Labs   Lab Test 11/20/17  1035   WBC 6.4   RBC 4.28   HGB 14.2   HCT 43.0                   Passed - Normal serum creatinine on file in past 26 months    Recent Labs   Lab Test 11/20/17  1035   CR 0.74            Passed - Normal ALT or AST on file in past 26 months    Recent Labs   Lab Test 09/11/18  0908   ALT 40     Recent Labs   Lab Test 09/11/18  0908   AST 27            Passed - Normal platelet count on file in past 26 months    Recent Labs   Lab Test 11/20/17  1035                 Passed - Medication is active on med list       Passed - No active pregnancy on record       Passed - No positive pregnancy test in last 12 months        "

## 2019-05-17 ENCOUNTER — TELEPHONE (OUTPATIENT)
Dept: INTERNAL MEDICINE | Facility: CLINIC | Age: 79
End: 2019-05-17

## 2019-05-17 DIAGNOSIS — I10 ESSENTIAL HYPERTENSION, BENIGN: Primary | ICD-10-CM

## 2019-05-17 DIAGNOSIS — R60.0 BILATERAL EDEMA OF LOWER EXTREMITY: ICD-10-CM

## 2019-05-17 RX ORDER — HYDROCHLOROTHIAZIDE 50 MG/1
TABLET ORAL
Qty: 90 TABLET | Refills: 0 | Status: SHIPPED | OUTPATIENT
Start: 2019-05-17 | End: 2019-08-15

## 2019-05-17 NOTE — TELEPHONE ENCOUNTER
Routing refill request to provider for review/approval because:  Labs not current:  Creatinine, potassium, Sodium      Lab pended, please advise,     Thank you.

## 2019-05-17 NOTE — TELEPHONE ENCOUNTER
"Requested Prescriptions   Pending Prescriptions Disp Refills     hydrochlorothiazide (HYDRODIURIL) 50 MG tablet [Pharmacy Med Name: HYDROCHLOROTHIAZIDE 50MG TABLETS] 90 tablet 0     Sig: TAKE 1 TABLET(50 MG) BY MOUTH DAILY   Last Written Prescription Date:  02/28/2019  Last Fill Quantity: 90,  # refills: 0   Last office visit: 12/18/2018 with prescribing provider:     Future Office Visit:      Diuretics (Including Combos) Protocol Failed - 5/17/2019 11:15 AM        Failed - Normal serum creatinine on file in past 12 months     Recent Labs   Lab Test 11/20/17  1035   CR 0.74              Failed - Normal serum potassium on file in past 12 months     Recent Labs   Lab Test 11/20/17  1035   POTASSIUM 3.6                    Failed - Normal serum sodium on file in past 12 months     Recent Labs   Lab Test 11/20/17  1035                 Passed - Blood pressure under 140/90 in past 12 months     BP Readings from Last 3 Encounters:   12/18/18 120/71   07/25/18 116/64   11/20/17 120/76                 Passed - Recent (12 mo) or future (30 days) visit within the authorizing provider's specialty     Patient had office visit in the last 12 months or has a visit in the next 30 days with authorizing provider or within the authorizing provider's specialty.  See \"Patient Info\" tab in inbasket, or \"Choose Columns\" in Meds & Orders section of the refill encounter.              Passed - Medication is active on med list        Passed - Patient is age 18 or older        Passed - No active pregancy on record        Passed - No positive pregnancy test in past 12 months        "

## 2019-05-21 NOTE — TELEPHONE ENCOUNTER
Called patient and scheduled a lab only appointment as requested by primary care provider. Patient will be fasting. Patient has a lab only appointment scheduled for 5/27/19.

## 2019-05-22 DIAGNOSIS — M79.7 FIBROMYALGIA: ICD-10-CM

## 2019-05-22 RX ORDER — TRAMADOL HYDROCHLORIDE 50 MG/1
TABLET ORAL
Qty: 60 TABLET | Refills: 0 | Status: SHIPPED | OUTPATIENT
Start: 2019-05-22 | End: 2019-07-09

## 2019-05-22 NOTE — TELEPHONE ENCOUNTER
Controlled Substance Refill Request for tramadol  Problem List Complete:  No     PROVIDER TO CONSIDER COMPLETION OF PROBLEM LIST AND OVERVIEW/CONTROLLED SUBSTANCE AGREEMENT    Last Written Prescription Date:  3/13/19  Last Fill Quantity: 60,   # refills: 0    THE MOST RECENT OFFICE VISIT MUST BE WITHIN THE PAST 3 MONTHS. AT LEAST ONE FACE TO FACE VISIT MUST OCCUR EVERY 6 MONTHS. ADDITIONAL VISITS CAN BE VIRTUAL.  (THIS STATEMENT SHOULD BE DELETED.)    Last Office Visit with [facility] primary care provider: 12/18/19 Rolo, 7/25/18 Pamela    Future Office visit:     Controlled substance agreement:   Encounter-Level CSA:    There are no encounter-level csa.     Patient-Level CSA:    There are no patient-level csa.         Last Urine Drug Screen: No results found for: CDAUT, No results found for: COMDAT, No results found for: THC13, PCP13, COC13, MAMP13, OPI13, AMP13, BZO13, TCA13, MTD13, BAR13, OXY13, PPX13, BUP13     Processing:  Fax Rx to [phone] pharmacy     [email or web address]       checked in past 3 months?  No, route to RN

## 2019-05-22 NOTE — TELEPHONE ENCOUNTER
RX [place] ([place] reviewed:  reviewed- no concerns    Routing refill request to provider for review/approval because:  Drug not on the [facility] refill protocol

## 2019-05-28 DIAGNOSIS — I10 ESSENTIAL HYPERTENSION, BENIGN: ICD-10-CM

## 2019-05-28 DIAGNOSIS — R60.0 BILATERAL EDEMA OF LOWER EXTREMITY: ICD-10-CM

## 2019-05-28 PROCEDURE — 36415 COLL VENOUS BLD VENIPUNCTURE: CPT | Performed by: INTERNAL MEDICINE

## 2019-05-28 PROCEDURE — 80053 COMPREHEN METABOLIC PANEL: CPT | Performed by: INTERNAL MEDICINE

## 2019-05-29 LAB
ALBUMIN SERPL-MCNC: 3.6 G/DL (ref 3.4–5)
ALP SERPL-CCNC: 107 U/L (ref 40–150)
ALT SERPL W P-5'-P-CCNC: 35 U/L (ref 0–50)
ANION GAP SERPL CALCULATED.3IONS-SCNC: 6 MMOL/L (ref 3–14)
AST SERPL W P-5'-P-CCNC: 24 U/L (ref 0–45)
BILIRUB SERPL-MCNC: 0.4 MG/DL (ref 0.2–1.3)
BUN SERPL-MCNC: 20 MG/DL (ref 7–30)
CALCIUM SERPL-MCNC: 9.2 MG/DL (ref 8.5–10.1)
CHLORIDE SERPL-SCNC: 105 MMOL/L (ref 94–109)
CO2 SERPL-SCNC: 30 MMOL/L (ref 20–32)
CREAT SERPL-MCNC: 0.71 MG/DL (ref 0.52–1.04)
GFR SERPL CREATININE-BSD FRML MDRD: 81 ML/MIN/{1.73_M2}
GLUCOSE SERPL-MCNC: 117 MG/DL (ref 70–99)
POTASSIUM SERPL-SCNC: 3.3 MMOL/L (ref 3.4–5.3)
PROT SERPL-MCNC: 7.2 G/DL (ref 6.8–8.8)
SODIUM SERPL-SCNC: 141 MMOL/L (ref 133–144)

## 2019-06-04 ENCOUNTER — OFFICE VISIT (OUTPATIENT)
Dept: INTERNAL MEDICINE | Facility: CLINIC | Age: 79
End: 2019-06-04
Payer: MEDICARE

## 2019-06-04 VITALS
HEART RATE: 85 BPM | DIASTOLIC BLOOD PRESSURE: 70 MMHG | RESPIRATION RATE: 12 BRPM | OXYGEN SATURATION: 95 % | WEIGHT: 144 LBS | HEIGHT: 64 IN | TEMPERATURE: 98.5 F | SYSTOLIC BLOOD PRESSURE: 120 MMHG | BODY MASS INDEX: 24.59 KG/M2

## 2019-06-04 DIAGNOSIS — R73.9 HYPERGLYCEMIA: ICD-10-CM

## 2019-06-04 DIAGNOSIS — R19.7 DIARRHEA, UNSPECIFIED TYPE: Primary | ICD-10-CM

## 2019-06-04 DIAGNOSIS — J30.2 SEASONAL ALLERGIC RHINITIS, UNSPECIFIED TRIGGER: ICD-10-CM

## 2019-06-04 LAB
BASOPHILS # BLD AUTO: 0 10E9/L (ref 0–0.2)
BASOPHILS NFR BLD AUTO: 0.3 %
DIFFERENTIAL METHOD BLD: ABNORMAL
EOSINOPHIL # BLD AUTO: 0.2 10E9/L (ref 0–0.7)
EOSINOPHIL NFR BLD AUTO: 2.2 %
ERYTHROCYTE [DISTWIDTH] IN BLOOD BY AUTOMATED COUNT: 13.4 % (ref 10–15)
HBA1C MFR BLD: 5.9 % (ref 0–5.6)
HCT VFR BLD AUTO: 46.4 % (ref 35–47)
HGB BLD-MCNC: 14.9 G/DL (ref 11.7–15.7)
LYMPHOCYTES # BLD AUTO: 2.2 10E9/L (ref 0.8–5.3)
LYMPHOCYTES NFR BLD AUTO: 28.4 %
MCH RBC QN AUTO: 32.4 PG (ref 26.5–33)
MCHC RBC AUTO-ENTMCNC: 32.1 G/DL (ref 31.5–36.5)
MCV RBC AUTO: 101 FL (ref 78–100)
MONOCYTES # BLD AUTO: 0.5 10E9/L (ref 0–1.3)
MONOCYTES NFR BLD AUTO: 6.5 %
NEUTROPHILS # BLD AUTO: 4.7 10E9/L (ref 1.6–8.3)
NEUTROPHILS NFR BLD AUTO: 62.6 %
PLATELET # BLD AUTO: 318 10E9/L (ref 150–450)
RBC # BLD AUTO: 4.6 10E12/L (ref 3.8–5.2)
WBC # BLD AUTO: 7.6 10E9/L (ref 4–11)

## 2019-06-04 PROCEDURE — 99214 OFFICE O/P EST MOD 30 MIN: CPT | Performed by: INTERNAL MEDICINE

## 2019-06-04 PROCEDURE — 36415 COLL VENOUS BLD VENIPUNCTURE: CPT | Performed by: INTERNAL MEDICINE

## 2019-06-04 PROCEDURE — 84443 ASSAY THYROID STIM HORMONE: CPT | Performed by: INTERNAL MEDICINE

## 2019-06-04 PROCEDURE — 85025 COMPLETE CBC W/AUTO DIFF WBC: CPT | Performed by: INTERNAL MEDICINE

## 2019-06-04 PROCEDURE — 80053 COMPREHEN METABOLIC PANEL: CPT | Performed by: INTERNAL MEDICINE

## 2019-06-04 PROCEDURE — 83036 HEMOGLOBIN GLYCOSYLATED A1C: CPT | Performed by: INTERNAL MEDICINE

## 2019-06-04 RX ORDER — BUTALBITAL, ACETAMINOPHEN AND CAFFEINE 50; 325; 40 MG/1; MG/1; MG/1
1 TABLET ORAL EVERY 4 HOURS PRN
COMMUNITY
End: 2020-01-15

## 2019-06-04 RX ORDER — IPRATROPIUM BROMIDE 21 UG/1
2 SPRAY, METERED NASAL EVERY 12 HOURS
Qty: 30 ML | Refills: 0 | Status: SHIPPED | OUTPATIENT
Start: 2019-06-04 | End: 2019-12-17

## 2019-06-04 ASSESSMENT — MIFFLIN-ST. JEOR: SCORE: 1122.15

## 2019-06-04 NOTE — PROGRESS NOTES
Subjective     Nubia Coronel is a 78 year old female who presents to clinic today for the following health issues:    HPI     IBS f/u, started in January 2019, diarrhea, had workup done then.  She had severe diarrhea in [place].   She had stool cultures, which were negative.  No fevers or chills.  No blood in the stool.  She was told that she might have a tapeworm.   She had received an antibiotic.   She had 1 month of watery diarrhea.    She had lost 8 pounds approximately and felt very weak.    Then she started having normal stools and gaining weight.   Started on her normal Weight Watcher regimen and then she would eat something and would have diarrhea.    The patient reports this had happened in the past.  She started monitoring spicy foods.  She would be okay for 4-5 days, and then have another bout.   She has had fecal incontinence.   She has had cramping and gurgling and sharp pain diffusely.  The patient becomes gassy and bloated.  The pain improves after having a stool.      She did not have any symptoms at night.  Her symptoms show up in the morning.  She had a normal colonoscopy 2012.    This is greatly affecting her life.     Her gallbladder was removed age 31.     No more stress than usual.     Hyperglycemia. Patient would like her hemoglobin A1c checked.      Seasonal allergies.  Patient feels like her ears are plugged.  She also has a runny nose.       Headaches.  Taking topamax daily. Uses fioricet every couple of months.   Fibromyalgia. Takes tramadol every morning.        Amount of exercise or physical activity: active    Problems taking medications regularly: No    Medication side effects: none    Diet: regular (no restrictions)            Reviewed and updated as needed this visit by Provider         Review of Systems   ROS COMP: CONSTITUTIONAL: NEGATIVE for fever, chills; POS weight loss with diarrhea  ENT/MOUTH: POS runny nose; ears plugged  RESP: NEGATIVE for significant cough or  SOB  CV: NEGATIVE for chest pain, palpitations or peripheral edema   GI: POS diarrhea and abdominal pain      Objective    There were no vitals taken for this visit.  There is no height or weight on file to calculate BMI.  Physical Exam   GENERAL: healthy, alert and no distress  HEENT: no wax in ear canals bilaterally  RESP: lungs clear to auscultation - no rales, rhonchi or wheezes  CV: regular rate and rhythm, normal S1 S2, no S3 or S4, no murmur, click or rub, no peripheral edema and peripheral pulses strong  ABDOMEN: soft, nontender, no hepatosplenomegaly, no masses and bowel sounds normal      Diagnostic Test Results:  Labs reviewed in Epic        Assessment & Plan       ICD-10-CM    1. Diarrhea, unspecified type R19.7 GASTROENTEROLOGY ADULT REF CONSULT ONLY     Comprehensive metabolic panel     TSH with free T4 reflex     CBC with platelets differential   2. Hyperglycemia R73.9 Hemoglobin A1c   3. Seasonal allergic rhinitis, unspecified trigger J30.2 ipratropium (ATROVENT) 0.03 % nasal spray      consider IBS    See Patient Instructions    Return in about 6 months (around 12/4/2019) for Physical Exam.      Jazzy Santiago MD  [facility]

## 2019-06-04 NOTE — NURSING NOTE
"/70   Pulse 85   Temp 98.5  F (36.9  C) (Oral)   Resp 12   Ht 1.632 m (5' 4.25\")   Wt 65.3 kg (144 lb)   SpO2 95%   BMI 24.53 kg/m      "

## 2019-06-05 LAB
ALBUMIN SERPL-MCNC: 3.6 G/DL (ref 3.4–5)
ALP SERPL-CCNC: 115 U/L (ref 40–150)
ALT SERPL W P-5'-P-CCNC: 33 U/L (ref 0–50)
ANION GAP SERPL CALCULATED.3IONS-SCNC: 9 MMOL/L (ref 3–14)
AST SERPL W P-5'-P-CCNC: 27 U/L (ref 0–45)
BILIRUB SERPL-MCNC: 0.3 MG/DL (ref 0.2–1.3)
BUN SERPL-MCNC: 15 MG/DL (ref 7–30)
CALCIUM SERPL-MCNC: 9.7 MG/DL (ref 8.5–10.1)
CHLORIDE SERPL-SCNC: 107 MMOL/L (ref 94–109)
CO2 SERPL-SCNC: 27 MMOL/L (ref 20–32)
CREAT SERPL-MCNC: 0.71 MG/DL (ref 0.52–1.04)
GFR SERPL CREATININE-BSD FRML MDRD: 82 ML/MIN/{1.73_M2}
GLUCOSE SERPL-MCNC: 119 MG/DL (ref 70–99)
POTASSIUM SERPL-SCNC: 4.2 MMOL/L (ref 3.4–5.3)
PROT SERPL-MCNC: 7.2 G/DL (ref 6.8–8.8)
SODIUM SERPL-SCNC: 143 MMOL/L (ref 133–144)
TSH SERPL DL<=0.005 MIU/L-ACNC: 0.64 MU/L (ref 0.4–4)

## 2019-06-20 ENCOUNTER — TRANSFERRED RECORDS (OUTPATIENT)
Dept: HEALTH INFORMATION MANAGEMENT | Facility: CLINIC | Age: 79
End: 2019-06-20

## 2019-07-09 DIAGNOSIS — M79.7 FIBROMYALGIA: ICD-10-CM

## 2019-07-09 NOTE — TELEPHONE ENCOUNTER
Patient requesting a 90 day supply    Tramadol      Last Written Prescription Date:  5/22/19  Last Fill Quantity: 60,   # refills: 0  Last Office Visit: 6/4/19  Future Office visit:       Routing refill request to provider for review/approval because:  Drug not on the [facility], [facility] or [facility] refill protocol or controlled substance  Writer is not authorized to check  for primary care provider

## 2019-07-10 RX ORDER — TRAMADOL HYDROCHLORIDE 50 MG/1
TABLET ORAL
Qty: 60 TABLET | Refills: 0 | Status: SHIPPED | OUTPATIENT
Start: 2019-07-10 | End: 2019-09-11

## 2019-07-10 NOTE — TELEPHONE ENCOUNTER
Per chart, last refill was 5-22-19.    Please authorize refill as patient is almost out of medication and uses a mail-order pharmacy.    Medication pended.    Please advise, thanks.

## 2019-08-15 DIAGNOSIS — R60.0 BILATERAL EDEMA OF LOWER EXTREMITY: ICD-10-CM

## 2019-08-16 RX ORDER — HYDROCHLOROTHIAZIDE 50 MG/1
TABLET ORAL
Qty: 90 TABLET | Refills: 0 | Status: SHIPPED | OUTPATIENT
Start: 2019-08-16 | End: 2019-11-24

## 2019-08-16 NOTE — TELEPHONE ENCOUNTER
"Requested Prescriptions   Pending Prescriptions Disp Refills     hydrochlorothiazide (HYDRODIURIL) 50 MG tablet [Pharmacy Med Name: HYDROCHLOROTHIAZIDE 50MG TABLETS] 90 tablet 0     Sig: TAKE 1 TABLET(50 MG) BY MOUTH DAILY   Last Written Prescription Date:  05/17/2019  Last Fill Quantity: 90,  # refills: 0   Last office visit: 6/4/2019 with prescribing provider:     Future Office Visit:      Diuretics (Including Combos) Protocol Passed - 8/15/2019 10:11 PM        Passed - Blood pressure under 140/90 in past 12 months     BP Readings from Last 3 Encounters:   06/04/19 120/70   12/18/18 120/71   07/25/18 116/64                 Passed - Recent (12 mo) or future (30 days) visit within the authorizing provider's specialty     Patient had office visit in the last 12 months or has a visit in the next 30 days with authorizing provider or within the authorizing provider's specialty.  See \"Patient Info\" tab in inbasket, or \"Choose Columns\" in Meds & Orders section of the refill encounter.              Passed - Medication is active on med list        Passed - Patient is age 18 or older        Passed - No active pregancy on record        Passed - Normal serum creatinine on file in past 12 months     Recent Labs   Lab Test 06/04/19 0918   CR 0.71              Passed - Normal serum potassium on file in past 12 months     Recent Labs   Lab Test 06/04/19 0918   POTASSIUM 4.2                    Passed - Normal serum sodium on file in past 12 months     Recent Labs   Lab Test 06/04/19 0918                 Passed - No positive pregnancy test in past 12 months        "

## 2019-09-10 DIAGNOSIS — M79.7 FIBROMYALGIA: ICD-10-CM

## 2019-09-10 NOTE — TELEPHONE ENCOUNTER
Controlled Substance Refill Request for tramadol  Problem List Complete:  No     PROVIDER TO CONSIDER COMPLETION OF PROBLEM LIST AND OVERVIEW/CONTROLLED SUBSTANCE AGREEMENT    Last Written Prescription Date:  7/10/19  Last Fill Quantity: 60,   # refills: 0      Last Office Visit with [facility] primary care provider: 6/4/19 Pamela Betancur Office visit:     Controlled substance agreement:   Encounter-Level CSA:    There are no encounter-level csa.     Patient-Level CSA:    There are no patient-level csa.         Last Urine Drug Screen: No results found for: CDAUT, No results found for: COMDAT, No results found for: THC13, PCP13, COC13, MAMP13, OPI13, AMP13, BZO13, TCA13, MTD13, BAR13, OXY13, PPX13, BUP13     Processing:  Fax Rx to [facility]     [email or web address]       checked in past 3 months?  No, route to RN

## 2019-09-10 NOTE — TELEPHONE ENCOUNTER
Routing refill request to provider for review/approval because:  Drug not on the [facility] refill protocol      checked 9/10/19-no concerns

## 2019-09-11 RX ORDER — TRAMADOL HYDROCHLORIDE 50 MG/1
TABLET ORAL
Qty: 60 TABLET | Refills: 0 | Status: SHIPPED | OUTPATIENT
Start: 2019-09-11 | End: 2019-10-31

## 2019-10-30 DIAGNOSIS — M79.7 FIBROMYALGIA: ICD-10-CM

## 2019-10-30 NOTE — TELEPHONE ENCOUNTER
"Requested Prescriptions   Pending Prescriptions Disp Refills     traMADol (ULTRAM) 50 MG tablet  Last Written Prescription Date:  9/11/19  Last Fill Quantity: 60,  # refills: 0   Last office visit: 6/4/2019 with prescribing provider:  Pamela Betancur Office Visit:   Next 5 appointments (look out 90 days)    Nov 22, 2019  9:00 AM CST  SHORT with Jazzy Santiago MD  [facility] [address]  [facility] [address]  [phone]        60 tablet 0     Sig: Take one tablet daily in AM, and then one tablet on Tuesday, Thursday, Saturday in PM.       There is no refill protocol information for this order        topiramate (TOPAMAX) 25 MG tablet  Last Written Prescription Date:  3/22/19  Last Fill Quantity: 120,  # refills: 0   Last office visit: 6/4/2019 with prescribing provider:  Pamela Betancur Office Visit:   Next 5 appointments (look out 90 days)    Nov 22, 2019  9:00 AM CST  SHORT with Jazzy Santiago MD  [facility] [address]  [facility] [address]  [phone]        120 tablet 0     Sig: Take 1 tablet (25 mg) by mouth every morning AND 3 tablets (75 mg) every evening.       Anti-Seizure Meds Protocol  Failed - 10/30/2019 10:55 AM        Failed - Review Authorizing provider's last note.      Refer to last progress notes: confirm request is for original authorizing provider (cannot be through other providers).          Passed - Recent (12 mo) or future (30 days) visit within the authorizing provider's specialty     Patient has had an office visit with the authorizing provider or a provider within the authorizing providers department within the previous 12 mos or has a future within next 30 days. See \"Patient Info\" tab in inbasket, or \"Choose Columns\" in Meds & Orders section of the refill encounter.              Passed - Normal CBC on file in past 26 months     Recent Labs   Lab " Test 06/04/19 0918   WBC 7.6   RBC 4.60   HGB 14.9   HCT 46.4                    Passed - Normal serum creatinine on file in past 26 months     Recent Labs   Lab Test 06/04/19 0918   CR 0.71             Passed - Normal ALT or AST on file in past 26 months     Recent Labs   Lab Test 06/04/19 0918   ALT 33     Recent Labs   Lab Test 06/04/19 0918   AST 27             Passed - Normal platelet count on file in past 26 months     Recent Labs   Lab Test 06/04/19 0918                  Passed - Medication is active on med list        Passed - No active pregnancy on record        Passed - No positive pregnancy test in last 12 months

## 2019-10-31 RX ORDER — TOPIRAMATE 25 MG/1
TABLET, FILM COATED ORAL
Qty: 120 TABLET | Refills: 0 | Status: SHIPPED | OUTPATIENT
Start: 2019-10-31 | End: 2019-12-12

## 2019-10-31 RX ORDER — TRAMADOL HYDROCHLORIDE 50 MG/1
TABLET ORAL
Qty: 60 TABLET | Refills: 0 | Status: SHIPPED | OUTPATIENT
Start: 2019-10-31 | End: 2020-01-10

## 2019-10-31 NOTE — TELEPHONE ENCOUNTER
Topiramate is being prescribed by Dr. Santiago for headaches. Medication is being filled for 1 time refill only due to:  Future appointment scheduled     Controlled Substance Refill Request for Tramadol 50 mg  Problem List Complete:  No     PROVIDER TO CONSIDER COMPLETION OF PROBLEM LIST AND OVERVIEW/CONTROLLED SUBSTANCE AGREEMENT    Last Written Prescription Date:  9/11/19  Last Fill Quantity: 60,   # refills: 0    Last Office Visit with [facility] primary care provider: 6/4/19    Future Office visit:   Next 5 appointments (look out 90 days)    Nov 22, 2019  9:00 AM CST  SHORT with Jazzy Santiago MD  [facility] ([facility]) [address]  [facility] [address]  [phone]          Controlled substance agreement:   Encounter-Level CSA:    There are no encounter-level csa.     Patient-Level CSA:    There are no patient-level csa.         Last Urine Drug Screen: No results found for: CDAUT, No results found for: COMDAT, No results found for: THC13, PCP13, COC13, MAMP13, OPI13, AMP13, BZO13, TCA13, MTD13, BAR13, OXY13, PPX13, BUP13     Processing:  Rx to be electronically transmitted to pharmacy by provider      [email or web address]       checked in past 3 months?  Yes done 9/10/19     Routing refill request to provider for review/approval because:  Drug not on the [facility] refill protocol

## 2019-11-21 ENCOUNTER — TELEPHONE (OUTPATIENT)
Dept: INTERNAL MEDICINE | Facility: CLINIC | Age: 79
End: 2019-11-21

## 2019-11-21 DIAGNOSIS — Z79.899 ENCOUNTER FOR LONG-TERM (CURRENT) USE OF OTHER MEDICATIONS: Primary | ICD-10-CM

## 2019-11-21 NOTE — TELEPHONE ENCOUNTER
Please see message below and advise. Primary care provider is out of the office, will route to covering provider.    Next 5 appointments (look out 90 days)    Dec 17, 2019  8:30 AM CST  SHORT with Brionna Puri MD  [facility] ([facility]) [address]  [facility] [address]  [phone]

## 2019-11-21 NOTE — TELEPHONE ENCOUNTER
Patient would like to have labs done a few days before her appointment with Dr Puri (Dr Santiago not available) on 12/17/19. Please place orders and call patient back to advise.

## 2019-11-24 DIAGNOSIS — R60.0 BILATERAL EDEMA OF LOWER EXTREMITY: ICD-10-CM

## 2019-11-25 NOTE — TELEPHONE ENCOUNTER
"Requested Prescriptions   Pending Prescriptions Disp Refills     hydrochlorothiazide (HYDRODIURIL) 50 MG tablet [Pharmacy Med Name:  Last Written Prescription Date:  8/16/2019  Last Fill Quantity: 90,  # refills: 0   Last office visit: 6/4/2019 with prescribing provider:     Future Office Visit:   Next 5 appointments (look out 90 days)    Dec 17, 2019  8:30 AM CST  SHORT with Brionna Puri MD  [facility] ([facility]) [address]  [facility] [id number] [phone]        HYDROCHLOROTHIAZIDE 50MG TABLETS] 90 tablet 0     Sig: TAKE 1 TABLET(50 MG) BY MOUTH DAILY       Diuretics (Including Combos) Protocol Passed - 11/24/2019 10:19 PM        Passed - Blood pressure under 140/90 in past 12 months     BP Readings from Last 3 Encounters:   06/04/19 120/70   12/18/18 120/71   07/25/18 116/64                 Passed - Recent (12 mo) or future (30 days) visit within the authorizing provider's specialty     Patient has had an office visit with the authorizing provider or a provider within the authorizing providers department within the previous 12 mos or has a future within next 30 days. See \"Patient Info\" tab in inbasket, or \"Choose Columns\" in Meds & Orders section of the refill encounter.              Passed - Medication is active on med list        Passed - Patient is age 18 or older        Passed - No active pregancy on record        Passed - Normal serum creatinine on file in past 12 months     Recent Labs   Lab Test 06/04/19 0918   CR 0.71              Passed - Normal serum potassium on file in past 12 months     Recent Labs   Lab Test 06/04/19 0918   POTASSIUM 4.2                    Passed - Normal serum sodium on file in past 12 months     Recent Labs   Lab Test 06/04/19 0918                 Passed - No positive pregnancy test in past 12 months        "

## 2019-11-26 RX ORDER — HYDROCHLOROTHIAZIDE 50 MG/1
TABLET ORAL
Qty: 90 TABLET | Refills: 0 | Status: SHIPPED | OUTPATIENT
Start: 2019-11-26 | End: 2020-01-08

## 2019-11-26 NOTE — TELEPHONE ENCOUNTER
Prescription approved per [facility] Refill Protocol.    Next 5 appointments (look out 90 days)    Dec 17, 2019  8:30 AM CST  SHORT with Brionna Puri MD  [facility] ([facility]) [address]  [facility] [id number] [phone]

## 2019-12-10 DIAGNOSIS — Z79.899 ENCOUNTER FOR LONG-TERM (CURRENT) USE OF OTHER MEDICATIONS: ICD-10-CM

## 2019-12-10 LAB
ANION GAP SERPL CALCULATED.3IONS-SCNC: 5 MMOL/L (ref 3–14)
BUN SERPL-MCNC: 21 MG/DL (ref 7–30)
CALCIUM SERPL-MCNC: 9.7 MG/DL (ref 8.5–10.1)
CHLORIDE SERPL-SCNC: 105 MMOL/L (ref 94–109)
CO2 SERPL-SCNC: 29 MMOL/L (ref 20–32)
CREAT SERPL-MCNC: 0.76 MG/DL (ref 0.52–1.04)
GFR SERPL CREATININE-BSD FRML MDRD: 75 ML/MIN/{1.73_M2}
GLUCOSE SERPL-MCNC: 160 MG/DL (ref 70–99)
POTASSIUM SERPL-SCNC: 3 MMOL/L (ref 3.4–5.3)
SODIUM SERPL-SCNC: 139 MMOL/L (ref 133–144)

## 2019-12-10 PROCEDURE — 80048 BASIC METABOLIC PNL TOTAL CA: CPT | Performed by: INTERNAL MEDICINE

## 2019-12-10 PROCEDURE — 36415 COLL VENOUS BLD VENIPUNCTURE: CPT | Performed by: INTERNAL MEDICINE

## 2019-12-11 DIAGNOSIS — M79.7 FIBROMYALGIA: ICD-10-CM

## 2019-12-11 NOTE — TELEPHONE ENCOUNTER
"Requested Prescriptions   Pending Prescriptions Disp Refills     topiramate (TOPAMAX) 25 MG tablet Last Written Prescription Date:  10/31/2019  Last Fill Quantity: 120 tablet,  # refills: 0   Last office visit: 6/4/2019 with prescribing provider:  6/4/2019  Future Office Visit: 12/17/2019  Next 5 appointments (look out 90 days)    Dec 17, 2019  8:30 AM CST  SHORT [facility] [address]  [facility] [address]  [phone]        120 tablet 0     Sig: Take 1 tablet (25 mg) by mouth every morning AND 3 tablets (75 mg) every evening.       Anti-Seizure Meds Protocol  Failed - 12/11/2019  8:58 AM        Failed - Review Authorizing provider's last note.      Refer to last progress notes: confirm request is for original authorizing provider (cannot be through other providers).          Passed - Recent (12 mo) or future (30 days) visit within the authorizing provider's specialty     Patient has had an office visit with the authorizing provider or a provider within the authorizing providers department within the previous 12 mos or has a future within next 30 days. See \"Patient Info\" tab in inbasket, or \"Choose Columns\" in Meds & Orders section of the refill encounter.              Passed - Normal CBC on file in past 26 months     Recent Labs   Lab Test 06/04/19 0918   WBC 7.6   RBC 4.60   HGB 14.9   HCT 46.4                    Passed - Normal serum creatinine on file in past 26 months     Recent Labs   Lab Test 12/10/19  0833   CR 0.76             Passed - Normal ALT or AST on file in past 26 months     Recent Labs   Lab Test 06/04/19 0918   ALT 33     Recent Labs   Lab Test 06/04/19 0918   AST 27             Passed - Normal platelet count on file in past 26 months     Recent Labs   Lab Test 06/04/19 0918                  Passed - Medication is active on med list        Passed - No active pregnancy on record        " Passed - No positive pregnancy test in last 12 months

## 2019-12-12 RX ORDER — TOPIRAMATE 25 MG/1
TABLET, FILM COATED ORAL
Qty: 120 TABLET | Refills: 0 | Status: SHIPPED | OUTPATIENT
Start: 2019-12-12 | End: 2020-01-09

## 2019-12-12 NOTE — TELEPHONE ENCOUNTER
Routing refill request to provider for review/approval because:  Fails procotol    Next 5 appointments (look out 90 days)    Dec 17, 2019  8:30 AM CST  SHORT with Brionna Puri MD  [facility] ([facility]) [address]  [facility] [id number] [phone]

## 2019-12-16 ENCOUNTER — HOSPITAL ENCOUNTER (OUTPATIENT)
Dept: MAMMOGRAPHY | Facility: CLINIC | Age: 79
Discharge: HOME | End: 2019-12-16
Attending: OBSTETRICS & GYNECOLOGY | Admitting: OBSTETRICS & GYNECOLOGY
Payer: MEDICARE

## 2019-12-16 DIAGNOSIS — Z12.31 VISIT FOR SCREENING MAMMOGRAM: ICD-10-CM

## 2019-12-16 PROCEDURE — 77063 BREAST TOMOSYNTHESIS BI: CPT

## 2019-12-17 ENCOUNTER — OFFICE VISIT (OUTPATIENT)
Dept: INTERNAL MEDICINE | Facility: CLINIC | Age: 79
End: 2019-12-17
Payer: MEDICARE

## 2019-12-17 VITALS
DIASTOLIC BLOOD PRESSURE: 70 MMHG | HEIGHT: 64 IN | HEART RATE: 46 BPM | SYSTOLIC BLOOD PRESSURE: 100 MMHG | WEIGHT: 144 LBS | BODY MASS INDEX: 24.59 KG/M2 | RESPIRATION RATE: 12 BRPM | TEMPERATURE: 98.1 F | OXYGEN SATURATION: 97 %

## 2019-12-17 DIAGNOSIS — R73.03 PREDIABETES: ICD-10-CM

## 2019-12-17 DIAGNOSIS — F51.01 PRIMARY INSOMNIA: ICD-10-CM

## 2019-12-17 DIAGNOSIS — G43.019 INTRACTABLE MIGRAINE WITHOUT AURA AND WITHOUT STATUS MIGRAINOSUS: ICD-10-CM

## 2019-12-17 DIAGNOSIS — D75.89 MACROCYTOSIS: ICD-10-CM

## 2019-12-17 DIAGNOSIS — Z00.00 ENCOUNTER FOR MEDICARE ANNUAL WELLNESS EXAM: ICD-10-CM

## 2019-12-17 DIAGNOSIS — Z13.6 ENCOUNTER FOR LIPID SCREENING FOR CARDIOVASCULAR DISEASE: ICD-10-CM

## 2019-12-17 DIAGNOSIS — Z13.220 ENCOUNTER FOR LIPID SCREENING FOR CARDIOVASCULAR DISEASE: ICD-10-CM

## 2019-12-17 DIAGNOSIS — M79.7 FIBROMYALGIA: Primary | ICD-10-CM

## 2019-12-17 DIAGNOSIS — E87.6 HYPOKALEMIA: ICD-10-CM

## 2019-12-17 DIAGNOSIS — J30.2 SEASONAL ALLERGIC RHINITIS, UNSPECIFIED TRIGGER: ICD-10-CM

## 2019-12-17 DIAGNOSIS — R60.0 BILATERAL LOWER EXTREMITY EDEMA: ICD-10-CM

## 2019-12-17 DIAGNOSIS — K21.9 GASTROESOPHAGEAL REFLUX DISEASE, ESOPHAGITIS PRESENCE NOT SPECIFIED: ICD-10-CM

## 2019-12-17 DIAGNOSIS — E53.8 LOW SERUM VITAMIN B12: ICD-10-CM

## 2019-12-17 PROCEDURE — G0439 PPPS, SUBSEQ VISIT: HCPCS | Performed by: INTERNAL MEDICINE

## 2019-12-17 PROCEDURE — 99214 OFFICE O/P EST MOD 30 MIN: CPT | Mod: 25 | Performed by: INTERNAL MEDICINE

## 2019-12-17 RX ORDER — TRAZODONE HYDROCHLORIDE 50 MG/1
50 TABLET, FILM COATED ORAL AT BEDTIME
Qty: 30 TABLET | Refills: 0 | Status: SHIPPED | OUTPATIENT
Start: 2019-12-17 | End: 2020-01-06

## 2019-12-17 RX ORDER — IPRATROPIUM BROMIDE 21 UG/1
2 SPRAY, METERED NASAL EVERY 12 HOURS PRN
Qty: 30 ML | Refills: 0 | Status: SHIPPED | OUTPATIENT
Start: 2019-12-17 | End: 2020-01-09

## 2019-12-17 ASSESSMENT — MIFFLIN-ST. JEOR: SCORE: 1117.15

## 2019-12-17 NOTE — NURSING NOTE
"/70   Pulse (!) 46   Temp 98.1  F (36.7  C) (Oral)   Resp 12   Ht 1.632 m (5' 4.25\")   Wt 65.3 kg (144 lb)   SpO2 97%   BMI 24.53 kg/m      "

## 2019-12-17 NOTE — PATIENT INSTRUCTIONS
Patient Education   Personalized Prevention Plan  You are due for the preventive services outlined below.  Your care team is available to assist you in scheduling these services.  If you have already completed any of these items, please share that information with your care team to update in your medical record.  Health Maintenance Due   Topic Date Due     Zoster (Shingles) Vaccine (1 of 2) 12/02/1990     Annual Wellness Visit  08/23/2011     PHQ-2  01/01/2019     Diptheria Tetanus Pertussis (DTAP/TDAP/TD) Vaccine (2 - Td) 06/02/2019     FALL RISK ASSESSMENT  12/18/2019

## 2019-12-17 NOTE — PROGRESS NOTES
"  SUBJECTIVE:   Nubia Coronel is a 79 year old female who presents for Preventive Visit.    Are you in the first 12 months of your Medicare Part B coverage?  No    Physical Health:    In general, how would you rate your overall physical health? good    Outside of work, how many days during the week do you exercise? 2-3 days/week    Outside of work, approximately how many minutes a day do you exercise?30-45 minutes    If you drink alcohol do you typically have >3 drinks per day or >7 drinks per week? No    Do you usually eat at least 4 servings of fruit and vegetables a day, include whole grains & fiber and avoid regularly eating high fat or \"junk\" foods? Yes    Do you have any problems taking medications regularly?  No    Do you have any side effects from medications? none    Needs assistance for the following daily activities: housework-just with vaccuming    Which of the following safety concerns are present in your home?  none identified     Hearing impairment: Yes, Difficulty following a conversation in a noisy restaurant or crowded room (sometimes), Difficulty understanding soft or whispered speech.    In the past 6 months, have you been bothered by leaking of urine? yes    Mental Health:    In general, how would you rate your overall mental or emotional health? good  PHQ-2 Score: 0    Do you feel safe in your environment? Yes    Have you ever done Advance Care Planning? (For example, a Health Directive, POLST, or a discussion with a medical provider or your loved ones about your wishes): Yes, advance care planning is on file.    Additional concerns to address?  No    Fall risk:  Fallen 2 or more times in the past year?: No  Any fall with injury in the past year?: No    Cognitive Screenin) Repeat 3 items (Leader, Season, Table)    2) Clock draw: NORMAL  3) 3 item recall: Recalls 2 objects   Results: NORMAL clock, 1-2 items recalled: COGNITIVE IMPAIRMENT LESS LIKELY    Mini-CogTM Copyright S " Ofelia. Licensed by the author for use in [facility]; reprinted with permission (su@[facility]). All rights reserved.      Do you have sleep apnea, excessive snoring or daytime drowsiness?: no    Patient is here for medication refills.  Patient usually gets her medication through [facility] and waiting for a different mail order from beginning of the year.  But she will be going to [place] the day after [place] when she 1 make sure her medications are filled.    Patient has fibromyalgia and takes tramadol.  She usually take 1 tab in the morning and 1 tablet in the evening 3 days a week.  When she goes to [place] her use of tramadol is reduced.    Patient has bilateral lower extremity swelling and has been on hydrochlorothiazide.  Patient is a 50 mg and recent labs showed hypokalemia around 3.  Patient was advised to eat potassium rich food.  Patient is not on potassium supplement with hydrochlorothiazide.    Patient has gastroesophageal reflux disease and takes Nexium.  Denies reflux symptoms.    Patient has migraine headaches and use Fioricet as needed.      Patient has chronic seasonal allergic rhinitis.  Patient takes Atrovent and antihistamine as needed.  It does not change if she is in [place] or [place].    Patient has insomnia and has been on Ambien for a long time in the past.  Currently she has difficulty sleeping and may sleep 2 to 3 hours a day.  Patient tried trazodone from her  1 day and slept really good.  Patient tried over-the-counter melatonin and that did not help with the sleep.      Patient also has macrocytosis MCV was more than 101.  Does not have thyroid disease and I do not have vitamin B12.    Reviewed and updated as needed this visit by clinical staff  Tobacco  Allergies  Meds  Problems  Med Hx  Surg Hx  Fam Hx  Soc Hx        Reviewed and updated as needed this visit by Provider  Meds  Problems  Med Hx        Social History     Tobacco Use     Smoking  status: Former Smoker     Last attempt to quit: 10/1/1981     Years since quittin.2     Smokeless tobacco: Never Used   Substance Use Topics     Alcohol use: Yes     Comment: social                           Current providers sharing in care for this patient include:   Patient Care Team:  Jazzy Santiago MD as PCP - General (Internal Medicine)  Jazzy Santiago MD as Assigned PCP    The following health maintenance items are reviewed in Epic and correct as of today:  Health Maintenance   Topic Date Due     ZOSTER IMMUNIZATION (1 of 2) 1990     MEDICARE ANNUAL WELLNESS VISIT  2011     PHQ-2  2019     DTAP/TDAP/TD IMMUNIZATION (2 - Td) 2019     FALL RISK ASSESSMENT  2019     ADVANCE CARE PLANNING  10/24/2022     LIPID  2022     DEXA  Completed     INFLUENZA VACCINE  Completed     PNEUMOCOCCAL IMMUNIZATION 65+ LOW/MEDIUM RISK  Completed     IPV IMMUNIZATION  Aged Out     MENINGITIS IMMUNIZATION  Aged Out     Labs reviewed in EPIC  Patient Active Problem List   Diagnosis     Headache     Chronic rhinitis     Myalgia and myositis     Renal calculus     CARDIOVASCULAR SCREENING; LDL GOAL LESS THAN 160     GERD (gastroesophageal reflux disease)     Bilateral lower extremity edema     Kidney stone     Fibromyalgia     Advance Care Planning     Past Surgical History:   Procedure Laterality Date     APPENDECTOMY       C NONSPECIFIC PROCEDURE      Negative MRI of the left shoulder     C NONSPECIFIC PROCEDURE      Normal bone density scan     C NONSPECIFIC PROCEDURE      Normal pap and mammos     C NONSPECIFIC PROCEDURE      Negative brain MRI     C NONSPECIFIC PROCEDURE      Negative liver biopy     C NONSPECIFIC PROCEDURE      S/P cholecystectomy     C NONSPECIFIC PROCEDURE      S/P hysterectomy     C NONSPECIFIC PROCEDURE      S/P appendectomy     C NONSPECIFIC PROCEDURE      Negative breast biopsy     CHOLECYSTECTOMY       COLONOSCOPY  2012     Procedure:COLONOSCOPY; COLONOSCOPY; Surgeon:ELIZABETH SINGH Location: GI     GYN SURGERY      hysterectomy     HEAD & NECK SURGERY      sinus surgery     ORTHOPEDIC SURGERY      bunions       Social History     Tobacco Use     Smoking status: Former Smoker     Last attempt to quit: 10/1/1981     Years since quittin.2     Smokeless tobacco: Never Used   Substance Use Topics     Alcohol use: Yes     Comment: social     Family History   Problem Relation Age of Onset     C.A.D. Father           73 yo MI     Family History Negative Mother         born 191     Respiratory Brother          68 yo Emphysema (smoker)     Diabetes Brother      C.A.D. Brother         pacemaker     Blood Disease Sister         Factor 2 - blood clots,allergies     Family History Negative Son      Family History Negative Son      Family History Negative Son          Current Outpatient Medications   Medication Sig Dispense Refill     ipratropium (ATROVENT) 0.03 % nasal spray Spray 2 sprays into both nostrils every 12 hours as needed for rhinitis 30 mL 0     traZODone (DESYREL) 50 MG tablet Take 1 tablet (50 mg) by mouth At Bedtime 30 tablet 0     Alpha-Lipoic Acid 300 MG TABS Take by mouth 3 times daily.        butalbital-acetaminophen-caffeine (FIORICET/ESGIC) -40 MG tablet Take 1 tablet by mouth every 4 hours as needed for headaches       cetirizine (ZYRTEC) 10 MG tablet Take 10 mg by mouth daily       Collagen-Boron-Hyaluronic Acid ([employer] JOINT HEALTH TRIPLE ACTION PO) Take 1 capsule by mouth daily       esomeprazole (NEXIUM) 20 MG DR capsule Take 20 mg by mouth every morning (before breakfast) Take 30-60 minutes before eating.       hydrochlorothiazide (HYDRODIURIL) 50 MG tablet TAKE 1 TABLET(50 MG) BY MOUTH DAILY 90 tablet 0     Levocarnitine 500 MG CAPS Take  by mouth. TID       Multiple Vitamins-Minerals (QC WOMENS DAILY MULTIVITAMIN PO) Take by mouth 2 times daily       topiramate (TOPAMAX) 25 MG tablet Take 1  "tablet (25 mg) by mouth every morning AND 3 tablets (75 mg) every evening. 120 tablet 0     traMADol (ULTRAM) 50 MG tablet Take one tablet daily in AM, and then one tablet on Tuesday, Thursday, Saturday in PM. 60 tablet 0     TUMS 500 OR 2 tabs q pm       Turmeric Curcumin 500 MG CAPS        VITAMIN D 1000 UNIT OR TABS 1 daily.        Allergies   Allergen Reactions     Cortisone Anaphylaxis     \"injected\"     Prednisolone Itching, Swelling and Rash     pretezone   Cannot use topical  prednisone       ROS:  CONSTITUTIONAL: NEGATIVE for fever, chills, change in weight  INTEGUMENTARY/SKIN: NEGATIVE for worrisome rashes, moles or lesions  EYES: NEGATIVE for vision changes or irritation  ENT/MOUTH: NEGATIVE for ear, mouth and throat problems  RESP: NEGATIVE for significant cough or SOB  BREAST: NEGATIVE for masses, tenderness or discharge  CV: NEGATIVE for chest pain, palpitations or peripheral edema  GI: NEGATIVE for nausea, abdominal pain, heartburn, or change in bowel habits  : NEGATIVE for frequency, dysuria, or hematuria  MUSCULOSKELETAL: POSITIVE for significant arthralgias or myalgia  NEURO: NEGATIVE for weakness, dizziness or paresthesias  ENDOCRINE: NEGATIVE for temperature intolerance, skin/hair changes  HEME: NEGATIVE for bleeding problems  PSYCHIATRIC: NEGATIVE for changes in mood or affect    OBJECTIVE:   /70   Pulse (!) 46   Temp 98.1  F (36.7  C) (Oral)   Resp 12   Ht 1.632 m (5' 4.25\")   Wt 65.3 kg (144 lb)   SpO2 97%   BMI 24.53 kg/m   Estimated body mass index is 24.53 kg/m  as calculated from the following:    Height as of this encounter: 1.632 m (5' 4.25\").    Weight as of this encounter: 65.3 kg (144 lb).  EXAM:   GENERAL: healthy, alert and no distress  EYES: Eyes grossly normal to inspection, PERRL and conjunctivae and sclerae normal  HENT: ear canals and TM's normal, nose and mouth without ulcers or lesions  NECK: no adenopathy, no asymmetry, masses, or scars and thyroid normal to " palpation  RESP: lungs clear to auscultation - no rales, rhonchi or wheezes  CV: regular rate and rhythm, normal S1 S2, no S3 or S4, no murmur, click or rub, no peripheral edema and peripheral pulses strong  ABDOMEN: soft, nontender, no hepatosplenomegaly, no masses and bowel sounds normal  MS: no gross musculoskeletal defects noted, no edema  SKIN: no suspicious lesions or rashes  NEURO: Normal strength and tone, mentation intact and speech normal  PSYCH: mentation appears normal, affect normal/bright      ASSESSMENT / PLAN:   Nubia was seen today for medication follow-up and medicare visit.    Diagnoses and all orders for this visit:    Fibromyalgia  -     Cancel: TSH with free T4 reflex    Prediabetes  -     **Comprehensive metabolic panel FUTURE anytime; Future  -     Cancel: TSH with free T4 reflex  -     **A1C FUTURE anytime; Future    Bilateral lower extremity edema  -     **Comprehensive metabolic panel FUTURE anytime; Future  -     Cancel: TSH with free T4 reflex  -     **TSH with free T4 reflex FUTURE anytime; Future    Macrocytosis  -     **CBC with platelets FUTURE anytime; Future  -     Cancel: TSH with free T4 reflex  -     Vitamin B12; Future  -     **TSH with free T4 reflex FUTURE anytime; Future    Hypokalemia  -     **Comprehensive metabolic panel FUTURE anytime; Future    Gastroesophageal reflux disease, esophagitis presence not specified  -     **Comprehensive metabolic panel FUTURE anytime; Future  -     Cancel: TSH with free T4 reflex    Seasonal allergic rhinitis, unspecified trigger  -     ipratropium (ATROVENT) 0.03 % nasal spray; Spray 2 sprays into both nostrils every 12 hours as needed for rhinitis  -     **CBC with platelets FUTURE anytime; Future  -     Cancel: TSH with free T4 reflex    Intractable migraine without aura and without status migrainosus    Primary insomnia  -     traZODone (DESYREL) 50 MG tablet; Take 1 tablet (50 mg) by mouth At Bedtime    Encounter for lipid  "screening for cardiovascular disease  -     Lipid panel reflex to direct LDL Fasting; Future    Encounter for Medicare annual wellness exam      Patient will check and let us know the pharmacy to send the refills. It is ideal to send the medications to local pharmacy and they can transfer to [place].  We will give a trial of trazodone.  Explained about interaction with trazodone and tramadol.  Patient is interested in cutting back her tramadol to try trazodone.  Advised her to start with half a tablet and she can try 50 mg if that helps with the sleep.  Patient voiced understanding.  Also will see how her potassium is doing.  If she is low she might benefit with a low-dose of potassium supplement with hydrochlorothiazide.    COUNSELING:  Reviewed preventive health counseling, as reflected in patient instructions       Regular exercise       Healthy diet/nutrition       Hearing screening       Fall risk prevention    Estimated body mass index is 24.53 kg/m  as calculated from the following:    Height as of this encounter: 1.632 m (5' 4.25\").    Weight as of this encounter: 65.3 kg (144 lb).       reports that she quit smoking about 38 years ago. She has never used smokeless tobacco.    Appropriate preventive services were discussed with this patient, including applicable screening as appropriate for cardiovascular disease, diabetes, osteopenia/osteoporosis, and glaucoma.  As appropriate for age/gender, discussed screening for colorectal cancer, prostate cancer, breast cancer, and cervical cancer. Checklist reviewing preventive services available has been given to the patient.    Reviewed patients plan of care and provided an AVS. The Basic Care Plan (routine screening as documented in Health Maintenance) for Nubia meets the Care Plan requirement. This Care Plan has been established and reviewed with the Patient.    Counseling Resources:  ATP IV Guidelines  Pooled Cohorts Equation Calculator  Breast Cancer Risk " Calculator  FRAX Risk Assessment  ICSI Preventive Guidelines  Dietary Guidelines for Americans, 2010  USDA's MyPlate  ASA Prophylaxis  Lung CA Screening    Brionna Puri MD  [facility]

## 2019-12-19 ENCOUNTER — TRANSFERRED RECORDS (OUTPATIENT)
Dept: HEALTH INFORMATION MANAGEMENT | Facility: CLINIC | Age: 79
End: 2019-12-19

## 2019-12-19 DIAGNOSIS — J30.2 SEASONAL ALLERGIC RHINITIS, UNSPECIFIED TRIGGER: ICD-10-CM

## 2019-12-19 DIAGNOSIS — E87.6 HYPOKALEMIA: ICD-10-CM

## 2019-12-19 DIAGNOSIS — Z13.6 ENCOUNTER FOR LIPID SCREENING FOR CARDIOVASCULAR DISEASE: ICD-10-CM

## 2019-12-19 DIAGNOSIS — R60.0 BILATERAL LOWER EXTREMITY EDEMA: ICD-10-CM

## 2019-12-19 DIAGNOSIS — K21.9 GASTROESOPHAGEAL REFLUX DISEASE, ESOPHAGITIS PRESENCE NOT SPECIFIED: ICD-10-CM

## 2019-12-19 DIAGNOSIS — R73.03 PREDIABETES: ICD-10-CM

## 2019-12-19 DIAGNOSIS — Z13.220 ENCOUNTER FOR LIPID SCREENING FOR CARDIOVASCULAR DISEASE: ICD-10-CM

## 2019-12-19 DIAGNOSIS — D75.89 MACROCYTOSIS: ICD-10-CM

## 2019-12-19 LAB
ERYTHROCYTE [DISTWIDTH] IN BLOOD BY AUTOMATED COUNT: 13.2 % (ref 10–15)
HBA1C MFR BLD: 6 % (ref 0–5.6)
HCT VFR BLD AUTO: 43.6 % (ref 35–47)
HGB BLD-MCNC: 14.1 G/DL (ref 11.7–15.7)
MCH RBC QN AUTO: 32.5 PG (ref 26.5–33)
MCHC RBC AUTO-ENTMCNC: 32.3 G/DL (ref 31.5–36.5)
MCV RBC AUTO: 101 FL (ref 78–100)
PLATELET # BLD AUTO: 291 10E9/L (ref 150–450)
RBC # BLD AUTO: 4.34 10E12/L (ref 3.8–5.2)
VIT B12 SERPL-MCNC: 292 PG/ML (ref 193–986)
WBC # BLD AUTO: 6.7 10E9/L (ref 4–11)

## 2019-12-19 PROCEDURE — 80053 COMPREHEN METABOLIC PANEL: CPT | Performed by: INTERNAL MEDICINE

## 2019-12-19 PROCEDURE — 84443 ASSAY THYROID STIM HORMONE: CPT | Performed by: INTERNAL MEDICINE

## 2019-12-19 PROCEDURE — 36415 COLL VENOUS BLD VENIPUNCTURE: CPT | Performed by: INTERNAL MEDICINE

## 2019-12-19 PROCEDURE — 82607 VITAMIN B-12: CPT | Performed by: INTERNAL MEDICINE

## 2019-12-19 PROCEDURE — 80061 LIPID PANEL: CPT | Performed by: INTERNAL MEDICINE

## 2019-12-19 PROCEDURE — 83036 HEMOGLOBIN GLYCOSYLATED A1C: CPT | Performed by: INTERNAL MEDICINE

## 2019-12-19 PROCEDURE — 85027 COMPLETE CBC AUTOMATED: CPT | Performed by: INTERNAL MEDICINE

## 2019-12-20 LAB
ALBUMIN SERPL-MCNC: 3.7 G/DL (ref 3.4–5)
ALP SERPL-CCNC: 91 U/L (ref 40–150)
ALT SERPL W P-5'-P-CCNC: 55 U/L (ref 0–50)
ANION GAP SERPL CALCULATED.3IONS-SCNC: 6 MMOL/L (ref 3–14)
AST SERPL W P-5'-P-CCNC: 32 U/L (ref 0–45)
BILIRUB SERPL-MCNC: 0.4 MG/DL (ref 0.2–1.3)
BUN SERPL-MCNC: 18 MG/DL (ref 7–30)
CALCIUM SERPL-MCNC: 9.5 MG/DL (ref 8.5–10.1)
CHLORIDE SERPL-SCNC: 105 MMOL/L (ref 94–109)
CHOLEST SERPL-MCNC: 189 MG/DL
CO2 SERPL-SCNC: 28 MMOL/L (ref 20–32)
CREAT SERPL-MCNC: 0.78 MG/DL (ref 0.52–1.04)
GFR SERPL CREATININE-BSD FRML MDRD: 72 ML/MIN/{1.73_M2}
GLUCOSE SERPL-MCNC: 137 MG/DL (ref 70–99)
HDLC SERPL-MCNC: 70 MG/DL
LDLC SERPL CALC-MCNC: 97 MG/DL
NONHDLC SERPL-MCNC: 119 MG/DL
POTASSIUM SERPL-SCNC: 3.2 MMOL/L (ref 3.4–5.3)
PROT SERPL-MCNC: 7.2 G/DL (ref 6.8–8.8)
SODIUM SERPL-SCNC: 139 MMOL/L (ref 133–144)
TRIGL SERPL-MCNC: 112 MG/DL
TSH SERPL DL<=0.005 MIU/L-ACNC: 0.42 MU/L (ref 0.4–4)

## 2019-12-20 RX ORDER — LANOLIN ALCOHOL/MO/W.PET/CERES
1000 CREAM (GRAM) TOPICAL DAILY
Qty: 90 TABLET | Refills: 0 | Status: SHIPPED | OUTPATIENT
Start: 2019-12-20 | End: 2020-03-23

## 2019-12-20 RX ORDER — POTASSIUM CHLORIDE 750 MG/1
10 TABLET, EXTENDED RELEASE ORAL
Qty: 12 TABLET | Refills: 2 | Status: SHIPPED | OUTPATIENT
Start: 2019-12-20 | End: 2020-03-23

## 2020-01-02 DIAGNOSIS — F51.01 PRIMARY INSOMNIA: ICD-10-CM

## 2020-01-02 NOTE — TELEPHONE ENCOUNTER
"Requested Prescriptions   Pending Prescriptions Disp Refills     traZODone (DESYREL) 50 MG tablet 30 tablet 0     Sig: Take 1 tablet (50 mg) by mouth At Bedtime   Last Written Prescription Date:  12/17/19  Last Fill Quantity: 30,  # refills: 0   Last office visit: 12/17/2019 with prescribing provider:  yes   Future Office Visit:        Serotonin Modulators Passed - 1/2/2020  1:23 PM        Passed - Recent (12 mo) or future (30 days) visit within the authorizing provider's specialty     Patient has had an office visit with the authorizing provider or a provider within the authorizing providers department within the previous 12 mos or has a future within next 30 days. See \"Patient Info\" tab in inbasket, or \"Choose Columns\" in Meds & Orders section of the refill encounter.              Passed - Medication is active on med list        Passed - Patient is age 18 or older        Passed - No active pregnancy on record        Passed - No positive pregnancy test in past 12 months          "

## 2020-01-06 DIAGNOSIS — M79.7 FIBROMYALGIA: ICD-10-CM

## 2020-01-06 RX ORDER — TRAZODONE HYDROCHLORIDE 50 MG/1
50 TABLET, FILM COATED ORAL AT BEDTIME
Qty: 90 TABLET | Refills: 3 | Status: SHIPPED | OUTPATIENT
Start: 2020-01-06 | End: 2021-02-02

## 2020-01-06 NOTE — TELEPHONE ENCOUNTER
Per 12/17/19 office visit note, patient was advised to return in one year    Routing refill request to provider for review/approval because:  Drug interaction warning

## 2020-01-06 NOTE — TELEPHONE ENCOUNTER
Controlled Substance Refill Request for Tramadol  Problem List Complete:  No     PROVIDER TO CONSIDER COMPLETION OF PROBLEM LIST AND OVERVIEW/CONTROLLED SUBSTANCE AGREEMENT    Last Written Prescription Date:  10/31/19  Last Fill Quantity: 60,   # refills: 0    THE MOST RECENT OFFICE VISIT MUST BE WITHIN THE PAST 3 MONTHS. AT LEAST ONE FACE TO FACE VISIT MUST OCCUR EVERY 6 MONTHS. ADDITIONAL VISITS CAN BE VIRTUAL.  (THIS STATEMENT SHOULD BE DELETED.)    Last Office Visit with [facility] primary care provider: 12/17/19 with Dr. Puri    Future Office visit:     Controlled substance agreement:   Encounter-Level CSA:    There are no encounter-level csa.     Patient-Level CSA:    There are no patient-level csa.         Last Urine Drug Screen: No results found for: CDAUT, No results found for: COMDAT, No results found for: THC13, PCP13, COC13, MAMP13, OPI13, AMP13, BZO13, TCA13, MTD13, BAR13, OXY13, PPX13, BUP13     Processing:  Rx to be electronically transmitted to pharmacy by provider      [email or web address]       checked in past 3 months?  No, route to RN     Controlled Substance Refill Request for Fioricet  Problem List Complete:  No     PROVIDER TO CONSIDER COMPLETION OF PROBLEM LIST AND OVERVIEW/CONTROLLED SUBSTANCE AGREEMENT    Last Written Prescription Date:  6/4/19  Last Fill Quantity: Unknown,   # refills: Unknown    THE MOST RECENT OFFICE VISIT MUST BE WITHIN THE PAST 3 MONTHS. AT LEAST ONE FACE TO FACE VISIT MUST OCCUR EVERY 6 MONTHS. ADDITIONAL VISITS CAN BE VIRTUAL.  (THIS STATEMENT SHOULD BE DELETED.)    Last Office Visit with [facility] primary care provider: 12/17/19 with Dr. Puri    Future Office visit:     Controlled substance agreement:   Encounter-Level CSA:    There are no encounter-level csa.     Patient-Level CSA:    There are no patient-level csa.         Last Urine Drug Screen: No results found for: CDAUT, No results found for: COMDAT, No results found for: THC13, PCP13,  COC13, MAMP13, OPI13, AMP13, BZO13, TCA13, MTD13, BAR13, OXY13, PPX13, BUP13     Processing:  Rx to be electronically transmitted to pharmacy by provider      [email or web address]       checked in past 3 months?  No, route to RN

## 2020-01-08 ENCOUNTER — TELEPHONE (OUTPATIENT)
Dept: INTERNAL MEDICINE | Facility: CLINIC | Age: 80
End: 2020-01-08

## 2020-01-08 DIAGNOSIS — F51.01 PRIMARY INSOMNIA: ICD-10-CM

## 2020-01-08 DIAGNOSIS — R60.0 BILATERAL EDEMA OF LOWER EXTREMITY: ICD-10-CM

## 2020-01-08 DIAGNOSIS — M79.7 FIBROMYALGIA: ICD-10-CM

## 2020-01-08 DIAGNOSIS — J30.2 SEASONAL ALLERGIC RHINITIS, UNSPECIFIED TRIGGER: ICD-10-CM

## 2020-01-08 RX ORDER — TRAZODONE HYDROCHLORIDE 50 MG/1
50 TABLET, FILM COATED ORAL AT BEDTIME
Qty: 90 TABLET | Refills: 3 | Status: CANCELLED | OUTPATIENT
Start: 2020-01-08

## 2020-01-08 NOTE — TELEPHONE ENCOUNTER
"Requested Prescriptions   Pending Prescriptions Disp Refills     hydrochlorothiazide (HYDRODIURIL) 50 MG tablet 90 tablet 0     Sig: Take 1 tablet (50 mg) by mouth daily   Last Written Prescription Date:  11/26/19  Last Fill Quantity: 90,  # refills: 0   Last office visit: 12/17/2019 with prescribing provider:  yes   Future Office Visit:        Diuretics (Including Combos) Protocol Failed - 1/8/2020  2:34 PM        Failed - Normal serum potassium on file in past 12 months     Recent Labs   Lab Test 12/19/19  0908   POTASSIUM 3.2*                    Passed - Blood pressure under 140/90 in past 12 months     BP Readings from Last 3 Encounters:   12/17/19 100/70   06/04/19 120/70   12/18/18 120/71                 Passed - Recent (12 mo) or future (30 days) visit within the authorizing provider's specialty     Patient has had an office visit with the authorizing provider or a provider within the authorizing providers department within the previous 12 mos or has a future within next 30 days. See \"Patient Info\" tab in inbasket, or \"Choose Columns\" in Meds & Orders section of the refill encounter.              Passed - Medication is active on med list        Passed - Patient is age 18 or older        Passed - No active pregancy on record        Passed - Normal serum creatinine on file in past 12 months     Recent Labs   Lab Test 12/19/19  0908   CR 0.78              Passed - Normal serum sodium on file in past 12 months     Recent Labs   Lab Test 12/19/19  0908                 Passed - No positive pregnancy test in past 12 months        ipratropium (ATROVENT) 0.03 % nasal spray 30 mL 0     Sig: Spray 2 sprays into both nostrils every 12 hours as needed for rhinitis       There is no refill protocol information for this order      Last Written Prescription Date:  12/17/19  Last Fill Quantity: 30,  # refills: 0   Last office visit: 12/17/2019 with prescribing provider:  yes   Future Office Visit:        "

## 2020-01-08 NOTE — TELEPHONE ENCOUNTER
Attempted to contact patient and there was no answer to phone.    Please recall patient.           Patient saw Dr. Pete on 12/17/19.  Does she still need to come in?

## 2020-01-09 RX ORDER — IPRATROPIUM BROMIDE 21 UG/1
2 SPRAY, METERED NASAL EVERY 12 HOURS PRN
Qty: 30 ML | Refills: 0 | Status: SHIPPED | OUTPATIENT
Start: 2020-01-09 | End: 2020-03-23

## 2020-01-09 RX ORDER — TOPIRAMATE 25 MG/1
TABLET, FILM COATED ORAL
Qty: 120 TABLET | Refills: 5 | Status: SHIPPED | OUTPATIENT
Start: 2020-01-09 | End: 2020-01-09

## 2020-01-09 RX ORDER — TOPIRAMATE 25 MG/1
TABLET, FILM COATED ORAL
Qty: 360 TABLET | Refills: 1 | Status: SHIPPED | OUTPATIENT
Start: 2020-01-09 | End: 2020-01-16

## 2020-01-09 RX ORDER — HYDROCHLOROTHIAZIDE 50 MG/1
50 TABLET ORAL DAILY
Qty: 90 TABLET | Refills: 1 | Status: SHIPPED | OUTPATIENT
Start: 2020-01-09 | End: 2020-04-22

## 2020-01-09 NOTE — TELEPHONE ENCOUNTER
[facility] pharmacy calling to check the status of this request as well as hctz.Human phone is  [phone]. Looks like we sent topiramate to [facility] pharmacy.

## 2020-01-09 NOTE — TELEPHONE ENCOUNTER
Routing refill request to provider for review/approval because:  Labs out of range:  K    Atrovent  Routing refill request to provider for review/approval because:  Drug not on the [facility] refill protocol

## 2020-01-10 RX ORDER — TRAMADOL HYDROCHLORIDE 50 MG/1
TABLET ORAL
Qty: 60 TABLET | Refills: 0 | Status: SHIPPED | OUTPATIENT
Start: 2020-01-10 | End: 2020-02-21

## 2020-01-10 RX ORDER — BUTALBITAL, ACETAMINOPHEN AND CAFFEINE 50; 325; 40 MG/1; MG/1; MG/1
1 TABLET ORAL EVERY 4 HOURS PRN
Status: CANCELLED | OUTPATIENT
Start: 2020-01-10

## 2020-01-15 DIAGNOSIS — G43.019 INTRACTABLE MIGRAINE WITHOUT AURA AND WITHOUT STATUS MIGRAINOSUS: Primary | ICD-10-CM

## 2020-01-15 NOTE — TELEPHONE ENCOUNTER
Requested Prescriptions   Pending Prescriptions Disp Refills     butalbital-acetaminophen-caffeine (FIORICET/ESGIC) -40 MG tablet       Sig: Take 1 tablet by mouth every 4 hours as needed for headaches       There is no refill protocol information for this order        Last Written Prescription Date:    Last Fill Quantity: ,  # refills:    Last office visit: 12/17/2019 with prescribing provider:  yes   Future Office Visit:      Freda Pressley CMA  [facility]  [place]

## 2020-01-16 ENCOUNTER — TELEPHONE (OUTPATIENT)
Dept: INTERNAL MEDICINE | Facility: CLINIC | Age: 80
End: 2020-01-16

## 2020-01-16 DIAGNOSIS — G43.019 INTRACTABLE MIGRAINE WITHOUT AURA AND WITHOUT STATUS MIGRAINOSUS: Primary | ICD-10-CM

## 2020-01-16 DIAGNOSIS — M79.7 FIBROMYALGIA: ICD-10-CM

## 2020-01-16 RX ORDER — TOPIRAMATE 25 MG/1
TABLET, FILM COATED ORAL
Qty: 360 TABLET | Refills: 1 | Status: SHIPPED | OUTPATIENT
Start: 2020-01-16 | End: 2020-04-22

## 2020-01-16 NOTE — TELEPHONE ENCOUNTER
[facility] pharmacy calling because insurance will only pay for 3 pills a day for the  Topiramate. Patient is willing to try taking three pills a day but we need to send a new rx then.       If patient needs to continue with four pills we need to do a PRIOR AUTHORIZATION.   id [id number]  . [phone]

## 2020-01-17 RX ORDER — BUTALBITAL, ACETAMINOPHEN AND CAFFEINE 50; 325; 40 MG/1; MG/1; MG/1
1 TABLET ORAL EVERY 4 HOURS PRN
Qty: 30 TABLET | Refills: 0 | Status: SHIPPED | OUTPATIENT
Start: 2020-01-17 | End: 2020-02-05

## 2020-01-17 NOTE — TELEPHONE ENCOUNTER
Routing refill request to provider for review/approval because:  Drug not on the [facility] or [facility] refill protocol or controlled substance  Medication listed as historical    Mame B - Registered Nurse  [facility]  Acute and Diagnostic Services

## 2020-01-17 NOTE — TELEPHONE ENCOUNTER
Central Prior Authorization Team   Phone: [phone]    PA Initiation    Medication: topiramate  Insurance Company: [employer] - Phone [phone] Fax [phone]  Pharmacy Filling the Rx: [employer] [facility] - [facility] [address]  Filling Pharmacy Phone: [phone]  Filling Pharmacy Fax: [phone]  Start Date: 1/17/2020

## 2020-01-17 NOTE — TELEPHONE ENCOUNTER
Central Prior Authorization Team   Phone: [phone]    Prior Authorization Approval    Authorization Effective Date: 1/17/2020  Authorization Expiration Date: 1/16/2021  Medication: topiramate  Approved Dose/Quantity:  Reference #: [id number]   Insurance Company: [employer] - Phone [phone] Fax [phone]  Expected CoPay:       CoPay Card Available:      Foundation Assistance Needed:    Which Pharmacy is filling the prescription (Not needed for infusion/clinic administered): [facility] - [address]  Pharmacy Notified: Yes  Patient Notified: Yes, **Instructed pharmacy to notify patient when script is ready to /ship.**

## 2020-02-04 DIAGNOSIS — G43.019 INTRACTABLE MIGRAINE WITHOUT AURA AND WITHOUT STATUS MIGRAINOSUS: ICD-10-CM

## 2020-02-04 NOTE — TELEPHONE ENCOUNTER
Requested Prescriptions   Pending Prescriptions Disp Refills     butalbital-acetaminophen-caffeine (FIORICET/ESGIC) -40 MG tablet   [Pharmacy Med Name: BUTALBITAL/ACETAMINOPHEN/CAFFEINE -40 MG   Tablet]  Last Written Prescription Date:  1/17/20  Last Fill Quantity: 30,  # refills: 0   Last office visit: 12/17/2019 with prescribing provider:  Jaxson   Future Office Visit:     30 tablet      Sig: TAKE 1 TABLET EVERY 4 HOURS AS NEEDED FOR HEADACHES       There is no refill protocol information for this order

## 2020-02-05 RX ORDER — BUTALBITAL, ACETAMINOPHEN AND CAFFEINE 50; 325; 40 MG/1; MG/1; MG/1
TABLET ORAL
Qty: 30 TABLET | Refills: 0 | Status: SHIPPED | OUTPATIENT
Start: 2020-02-05 | End: 2021-02-09

## 2020-02-05 NOTE — TELEPHONE ENCOUNTER
butalbital-acetaminophen-caffeine     Last Written Prescription Date:  1/17/20  Last Fill Quantity: 30,   # refills: 0  Last Office Visit: 12/17/19  Future Office visit:       Routing refill request to provider for review/approval because:  Drug not on the [facility], P or [facility] refill protocol or controlled substance

## 2020-02-21 DIAGNOSIS — M79.7 FIBROMYALGIA: ICD-10-CM

## 2020-02-21 RX ORDER — TRAMADOL HYDROCHLORIDE 50 MG/1
TABLET ORAL
Qty: 43 TABLET | Refills: 0 | Status: SHIPPED | OUTPATIENT
Start: 2020-02-21 | End: 2020-03-23

## 2020-02-21 NOTE — TELEPHONE ENCOUNTER
Pt states she is currently in [place] & [facility] has not sent her her Tramadol. She called [facility] and they told her to have her PCP send the RX to [facility] in [place] to get her by. Yisel #[phone]. Pt states she only has 4 pills left & needs to get this figured out between the clinic & the two pharmacies. Please call pt to advise on her cell.

## 2020-02-21 NOTE — TELEPHONE ENCOUNTER
Per  last fill was 11/2/19.    Call to [employer]. [facility] prescription was sent for 43 tablets on 1/16 so there are only 17 tablets left of that prescription. [facility] patient was sent a refill reminder on 2/6.    Call to patient. [facility] she received 2 prescriptions of her migraine medication. States when she received them she assumed one of them was her Tramadol. States when she went to put the medication away she realized that they were both for her migraine medicine and she did not have a new prescription for Tramadol. Patient asking for a prescription for Tramadol sent to local pharmacy for 2 weeks until she can get her prescription from [facility]. [facility] she is only able to fill prescriptions at [facility]. Discussed above  was given from [facility]. Patient was not aware there was not another full prescription available for her with [facility]. Patient requesting a one month supply (43 tablets) be sent to the pended local pharmacy. She would like to use this pharmacy instead of [facility] for the next 2 months until she gets back home the beginning of May.    Problem List Complete:  No     PROVIDER TO CONSIDER COMPLETION OF PROBLEM LIST AND OVERVIEW/CONTROLLED SUBSTANCE AGREEMENT    Last Written Prescription Date:  1/10/20  Last Fill Quantity: 60,   # refills: 0    THE MOST RECENT OFFICE VISIT MUST BE WITHIN THE PAST 3 MONTHS. AT LEAST ONE FACE TO FACE VISIT MUST OCCUR EVERY 6 MONTHS. ADDITIONAL VISITS CAN BE VIRTUAL.  (THIS STATEMENT SHOULD BE DELETED.)    Last Office Visit with [facility] primary care provider: 12/17/19 with Dr. Puri, 6/4/19 with primary care provider.    Future Office visit:     Controlled substance agreement:   Encounter-Level CSA:    There are no encounter-level csa.     Patient-Level CSA:    There are no patient-level csa.         Last Urine Drug Screen: No results found for: CDAUT, No results found for: COMDAT, No results found for: THC13, PCP13, COC13, MAMP13, OPI13, AMP13, BZO13, TCA13,  MTD13, BAR13, OXY13, PPX13, BUP13     Processing:  Rx to be electronically transmitted to pharmacy by provider      [email or web address]       checked in past 3 months?  Yes last rx shown is 11/2/19 but pt received an rx in January

## 2020-02-24 ENCOUNTER — TELEPHONE (OUTPATIENT)
Dept: INTERNAL MEDICINE | Facility: CLINIC | Age: 80
End: 2020-02-24

## 2020-02-24 NOTE — TELEPHONE ENCOUNTER
"Call to pharmacy. They state that the directions on the prescription are not clear enough.     Sig reads: \"Take one tablet daily in AM, and then one tablet on Tuesday, Thursday, Saturday in PM\"    Per chart, this is the dosing the patient has been on for quiet some time. Pharmacy states they don't understand how primary care provider could predict that patient would need an extra tablet on certain days of the week. Pharmacy requesting new directions please advise.    Per 11/14/2018 encounter:  \"She takes 3 extra a week in the PM because she has exercise classes\"  "

## 2020-02-24 NOTE — TELEPHONE ENCOUNTER
Patient calling    [facility] pharmacy in [facility] told her they can not fill rx for Tramadol due to not being able to read doctors name. This was sent via e-scrpit    Patient requesting we call the pharmacy and find out what is wrong. Pharmacy number is [phone]    Patient only has two pills left  Ok to call patient if need be and  [phone]

## 2020-02-25 NOTE — TELEPHONE ENCOUNTER
Received a fax regarding below asking if patient is taking twice daily on Tuesday, Thursday and Saturday or only taking at a different time of day on those days. Call to pharmacy. Advised patient takes an additional tablet on Tuesday, Thursday and Saturday in the evening.

## 2020-03-23 ENCOUNTER — VIRTUAL VISIT (OUTPATIENT)
Dept: INTERNAL MEDICINE | Facility: CLINIC | Age: 80
End: 2020-03-23
Payer: MEDICARE

## 2020-03-23 DIAGNOSIS — E53.8 LOW SERUM VITAMIN B12: ICD-10-CM

## 2020-03-23 DIAGNOSIS — J30.2 SEASONAL ALLERGIC RHINITIS, UNSPECIFIED TRIGGER: ICD-10-CM

## 2020-03-23 DIAGNOSIS — M79.7 FIBROMYALGIA: ICD-10-CM

## 2020-03-23 DIAGNOSIS — J01.01 ACUTE RECURRENT MAXILLARY SINUSITIS: Primary | ICD-10-CM

## 2020-03-23 PROCEDURE — G2012 BRIEF CHECK IN BY MD/QHP: HCPCS | Performed by: INTERNAL MEDICINE

## 2020-03-23 RX ORDER — TRAMADOL HYDROCHLORIDE 50 MG/1
TABLET ORAL
Qty: 43 TABLET | Refills: 0 | Status: SHIPPED | OUTPATIENT
Start: 2020-03-23 | End: 2020-04-22

## 2020-03-23 RX ORDER — LANOLIN ALCOHOL/MO/W.PET/CERES
1000 CREAM (GRAM) TOPICAL DAILY
Qty: 90 TABLET | Refills: 0 | Status: SHIPPED | OUTPATIENT
Start: 2020-03-23

## 2020-03-23 RX ORDER — IPRATROPIUM BROMIDE 21 UG/1
2 SPRAY, METERED NASAL EVERY 12 HOURS PRN
Qty: 30 ML | Refills: 0 | Status: SHIPPED | OUTPATIENT
Start: 2020-03-23 | End: 2021-04-29

## 2020-03-23 NOTE — PROGRESS NOTES
Phone visit for sinus infection and chronic medical conditions.  Pt has had sinus symptoms for 3 weeks, and she's down in [place] currently, unable to leave. Her allergies are bad right now as well/pollen in season. She has hx of sinus infections, and s/p sinus surgery. Takes zyrtec 1 po every day . Using Atrovent nasal spray as well. Wants refill on atrovent as well.  She's requesting Tramadol & B12 oral to be refilled as well.   BP has been ok/low.     Subjective     Nubia Coronel is a 79 year old female who presents to clinic today for the following health issues:    HPI     Sinuses.  The patient has had symptoms for 3 weeks.   Some headaches of frontal and maxillary.  Nasal discharge- green mucous color.   Nor sore throat or cough or shortness of breath.    No fevers or chills. The patient has been trying to doctor herself with the spray.  The patient reports that she has had exposure to pollen, which has triggered her allergies.    She has had sinus infections, and this feels similar.   Last infection was approximately 6 months ago.      Fibromyalgia.  Tramadol one every morning.   Trying to exercise.  Takes an extra one on Tuesday, Thursday, Saturday when she does water aerobics..    43 tablets per month.   Taking vitamin D.        Migraine.  She has not had any migraines since January 1 when she went to [place].   She is on topamax.  She takes fioricet only as needed.     Insomnia.  Takes trazodone and works well for sleep.   She is trying to exercise every day- she has a pool.    No caffeine. Limited alcohol.       Patient Active Problem List   Diagnosis     Headache     Chronic rhinitis     Myalgia and myositis     Renal calculus     CARDIOVASCULAR SCREENING; LDL GOAL LESS THAN 160     GERD (gastroesophageal reflux disease)     Bilateral lower extremity edema     Kidney stone     Fibromyalgia     Advance Care Planning     Primary insomnia     Prediabetes     Intractable migraine without aura and  without status migrainosus     Past Surgical History:   Procedure Laterality Date     APPENDECTOMY       C NONSPECIFIC PROCEDURE      Negative MRI of the left shoulder     C NONSPECIFIC PROCEDURE      Normal bone density scan     C NONSPECIFIC PROCEDURE      Normal pap and mammos     C NONSPECIFIC PROCEDURE      Negative brain MRI     C NONSPECIFIC PROCEDURE      Negative liver biopy     C NONSPECIFIC PROCEDURE      S/P cholecystectomy     C NONSPECIFIC PROCEDURE      S/P hysterectomy     C NONSPECIFIC PROCEDURE      S/P appendectomy     C NONSPECIFIC PROCEDURE      Negative breast biopsy     CHOLECYSTECTOMY       COLONOSCOPY  2012    Procedure:COLONOSCOPY; COLONOSCOPY; Surgeon:ELIZABETH SINGH; Location: GI     GYN SURGERY      hysterectomy     HEAD & NECK SURGERY      sinus surgery     ORTHOPEDIC SURGERY      bunions       Social History     Tobacco Use     Smoking status: Former Smoker     Last attempt to quit: 10/1/1981     Years since quittin.5     Smokeless tobacco: Never Used   Substance Use Topics     Alcohol use: Yes     Comment: social     Family History   Problem Relation Age of Onset     C.A.D. Father           75 yo MI     Family History Negative Mother         born 191     Respiratory Brother          70 yo Emphysema (smoker)     Diabetes Brother      C.A.D. Brother         pacemaker     Blood Disease Sister         Factor 2 - blood clots,allergies     Family History Negative Son      Family History Negative Son      Family History Negative Son            PROBLEMS TO ADD ON...  Reviewed and updated as needed this visit by Provider         Review of Systems   ROS COMP: CONSTITUTIONAL: NEGATIVE for fever, chills, change in weight  ENT/MOUTH: POS- see HPI  RESP: NEGATIVE for significant cough or SOB  CV: NEGATIVE for chest pain, palpitations or peripheral edema      Objective    There were no vitals taken for this visit.  There is no height or weight on file to  calculate BMI.  Physical Exam   Has not taken vitals at home    No exam since phone visit          A/P    Sinusitis.  Start augemtin.    Fibromyalgia.  Tramadol refilled.  Counseled on exercise.     Migraines.  Improved.  Patient is not currently taking fioricet.    Insomnia.  Trazodone.  No caffeine  exercise    Telephone visit 11-20 minutes      Jazzy Santiago MD

## 2020-04-03 ENCOUNTER — TELEPHONE (OUTPATIENT)
Dept: INTERNAL MEDICINE | Facility: CLINIC | Age: 80
End: 2020-04-03

## 2020-04-03 DIAGNOSIS — J01.00 SUBACUTE MAXILLARY SINUSITIS: Primary | ICD-10-CM

## 2020-04-03 NOTE — TELEPHONE ENCOUNTER
Patient is finished with antibiotics and now has symptoms of sinus infection back for the last 48 hours. Is wondering if she needs another round of medication. Call back to advise.

## 2020-04-06 NOTE — TELEPHONE ENCOUNTER
Call to patient. States she has been miserable since Friday. Patient finished antibiotics last Wednesday. She was feeling better Wednesday and Thursday. States Friday she started having large amounts of yellow mucus and headaches. Patient denies fever. States she has a history of chronic sinus infections and it often takes more than one round of antibiotics when she has a sinus infection. Advised telephone visit. Next available telephone visit with primary care provider is not until Wednesday. Patient states she doesn't want to wait until Wednesday. Patient does have an active [employer] account. Advised evisit. Patient states she would prefer that a message be sent to MD. Please advise.

## 2020-04-06 NOTE — TELEPHONE ENCOUNTER
Pt calling back to check on status of below message. Prescription can be sent to pharmacy that is teed up. Pt can be reached at [phone]. OK to leave a detailed message. Please advise. Thanks.

## 2020-04-08 RX ORDER — DOXYCYCLINE HYCLATE 100 MG
100 TABLET ORAL 2 TIMES DAILY
Qty: 20 TABLET | Refills: 0 | Status: SHIPPED | OUTPATIENT
Start: 2020-04-08 | End: 2020-04-22

## 2020-04-08 NOTE — TELEPHONE ENCOUNTER
Patient advised that rx has been sent to [facility] in [place], [place].  That is the correct pharmacy and she will pick rx up.  DEANNE Mejias R.N.

## 2020-04-17 DIAGNOSIS — M79.7 FIBROMYALGIA: ICD-10-CM

## 2020-04-17 NOTE — TELEPHONE ENCOUNTER
Controlled Substance Refill Request for Tramadol  Problem List Complete:  No     PROVIDER TO CONSIDER COMPLETION OF PROBLEM LIST AND OVERVIEW/CONTROLLED SUBSTANCE AGREEMENT    Last Written Prescription Date:  3/23/20  Last Fill Quantity: 43,   # refills: 0  *patient did not fill prescription from 3/23/2, last refill was from November 2019    THE MOST RECENT OFFICE VISIT MUST BE WITHIN THE PAST 3 MONTHS. AT LEAST ONE FACE TO FACE VISIT MUST OCCUR EVERY 6 MONTHS. ADDITIONAL VISITS CAN BE VIRTUAL.  (THIS STATEMENT SHOULD BE DELETED.)    [place] [facility] [place] provider: 3/23/19 virtual visit    Future Office visit:     Controlled substance agreement:   Encounter-Level CSA:    There are no encounter-level csa.     Patient-Level CSA:    There are no patient-level csa.         Last Urine Drug Screen: No results found for: CDAUT, No results found for: COMDAT, No results found for: THC13, PCP13, COC13, MAMP13, OPI13, AMP13, BZO13, TCA13, MTD13, BAR13, OXY13, PPX13, BUP13     RX [place] ([place] reviewed:  no concerns   [place]:  [email or web address]

## 2020-04-22 ENCOUNTER — VIRTUAL VISIT (OUTPATIENT)
Dept: INTERNAL MEDICINE | Facility: CLINIC | Age: 80
End: 2020-04-22
Payer: MEDICARE

## 2020-04-22 DIAGNOSIS — R60.0 BILATERAL EDEMA OF LOWER EXTREMITY: ICD-10-CM

## 2020-04-22 DIAGNOSIS — G43.019 INTRACTABLE MIGRAINE WITHOUT AURA AND WITHOUT STATUS MIGRAINOSUS: ICD-10-CM

## 2020-04-22 DIAGNOSIS — M79.7 FIBROMYALGIA: ICD-10-CM

## 2020-04-22 RX ORDER — HYDROCHLOROTHIAZIDE 50 MG/1
50 TABLET ORAL DAILY
Qty: 90 TABLET | Refills: 1 | Status: CANCELLED | OUTPATIENT
Start: 2020-04-22

## 2020-04-22 RX ORDER — TRAMADOL HYDROCHLORIDE 50 MG/1
TABLET ORAL
Qty: 43 TABLET | Refills: 0 | Status: SHIPPED | OUTPATIENT
Start: 2020-04-22 | End: 2020-09-14

## 2020-04-22 RX ORDER — TOPIRAMATE 25 MG/1
TABLET, FILM COATED ORAL
Qty: 360 TABLET | Refills: 1 | Status: CANCELLED | OUTPATIENT
Start: 2020-04-22

## 2020-04-22 RX ORDER — TOPIRAMATE 25 MG/1
TABLET, FILM COATED ORAL
Qty: 360 TABLET | Refills: 1 | Status: SHIPPED | OUTPATIENT
Start: 2020-04-22 | End: 2020-05-08

## 2020-04-22 RX ORDER — HYDROCHLOROTHIAZIDE 50 MG/1
50 TABLET ORAL DAILY
Qty: 90 TABLET | Refills: 1 | Status: SHIPPED | OUTPATIENT
Start: 2020-04-22 | End: 2020-05-08

## 2020-04-28 RX ORDER — TRAMADOL HYDROCHLORIDE 50 MG/1
TABLET ORAL
Qty: 43 TABLET | Refills: 0 | Status: SHIPPED | OUTPATIENT
Start: 2020-04-28 | End: 2020-06-08

## 2020-05-08 ENCOUNTER — TELEPHONE (OUTPATIENT)
Dept: INTERNAL MEDICINE | Facility: CLINIC | Age: 80
End: 2020-05-08

## 2020-05-08 DIAGNOSIS — R60.0 BILATERAL EDEMA OF LOWER EXTREMITY: ICD-10-CM

## 2020-05-08 DIAGNOSIS — G43.019 INTRACTABLE MIGRAINE WITHOUT AURA AND WITHOUT STATUS MIGRAINOSUS: ICD-10-CM

## 2020-05-08 RX ORDER — TOPIRAMATE 25 MG/1
TABLET, FILM COATED ORAL
Qty: 360 TABLET | Refills: 0 | Status: SHIPPED | OUTPATIENT
Start: 2020-05-08 | End: 2021-02-02

## 2020-05-08 RX ORDER — HYDROCHLOROTHIAZIDE 50 MG/1
50 TABLET ORAL DAILY
Qty: 90 TABLET | Refills: 0 | Status: SHIPPED | OUTPATIENT
Start: 2020-05-08 | End: 2020-09-16

## 2020-06-08 DIAGNOSIS — M79.7 FIBROMYALGIA: ICD-10-CM

## 2020-06-08 RX ORDER — TRAMADOL HYDROCHLORIDE 50 MG/1
TABLET ORAL
Qty: 43 TABLET | Refills: 0 | Status: SHIPPED | OUTPATIENT
Start: 2020-06-08 | End: 2020-07-07

## 2020-06-08 NOTE — TELEPHONE ENCOUNTER
Pt calling for below refill request:    Controlled Substance Refill Request for Tramadol  Problem List Complete:  No     PROVIDER TO CONSIDER COMPLETION OF PROBLEM LIST AND OVERVIEW/CONTROLLED SUBSTANCE AGREEMENT    Last Written Prescription Date:  4/28/20  Last Fill Quantity: 43,   # refills: 0    THE MOST RECENT OFFICE VISIT MUST BE WITHIN THE PAST 3 MONTHS. AT LEAST ONE FACE TO FACE VISIT MUST OCCUR EVERY 6 MONTHS. ADDITIONAL VISITS CAN BE VIRTUAL.  (THIS STATEMENT SHOULD BE DELETED.)    Last Office Visit with [facility] primary care provider: 4/22/20    Future Office visit:     Controlled substance agreement:   Encounter-Level CSA:    There are no encounter-level csa.     Patient-Level CSA:    There are no patient-level csa.         Last Urine Drug Screen: No results found for: CDAUT, No results found for: COMDAT, No results found for: THC13, PCP13, COC13, MAMP13, OPI13, AMP13, BZO13, TCA13, MTD13, BAR13, OXY13, PPX13, BUP13     Processing:  Rx to be electronically transmitted to pharmacy by provider      [email or web address]       checked in past 3 months?  No, route to RN

## 2020-07-06 DIAGNOSIS — M79.7 FIBROMYALGIA: ICD-10-CM

## 2020-07-07 RX ORDER — TRAMADOL HYDROCHLORIDE 50 MG/1
TABLET ORAL
Qty: 43 TABLET | Refills: 0 | Status: SHIPPED | OUTPATIENT
Start: 2020-07-07 | End: 2020-07-30

## 2020-07-07 NOTE — TELEPHONE ENCOUNTER
Controlled Substance Refill Request for Tramadol  Problem List Complete:  Yes    Last Written Prescription Date:  6/8/20  Last Fill Quantity: 43,   # refills: 0    THE MOST RECENT OFFICE VISIT MUST BE WITHIN THE PAST 3 MONTHS. AT LEAST ONE FACE TO FACE VISIT MUST OCCUR EVERY 6 MONTHS. ADDITIONAL VISITS CAN BE VIRTUAL.  (THIS STATEMENT SHOULD BE DELETED.)    [place] [facility] [place] [facility] [place] provider: 4/22/20    Future Office visit:     Controlled substance agreement:   Encounter-Level CSA:    There are no encounter-level csa.     Patient-Level CSA:    There are no patient-level csa.         Last Urine Drug Screen: No results found for: CDAUT, No results found for: COMDAT, No results found for: THC13, PCP13, COC13, MAMP13, OPI13, AMP13, BZO13, TCA13, MTD13, BAR13, OXY13, PPX13, BUP13     Processing:  Rx to be electronically transmitted to pharmacy by provider     [email or web address]   checked in past 3 months?  Yes 7/7/20 - no concerns

## 2020-07-29 DIAGNOSIS — M79.7 FIBROMYALGIA: ICD-10-CM

## 2020-07-30 RX ORDER — TRAMADOL HYDROCHLORIDE 50 MG/1
TABLET ORAL
Qty: 43 TABLET | Refills: 0 | Status: SHIPPED | OUTPATIENT
Start: 2020-07-30 | End: 2021-05-05

## 2020-07-30 NOTE — TELEPHONE ENCOUNTER
Controlled Substance Refill Request for Tramadol   Problem List Complete:  No     PROVIDER TO CONSIDER COMPLETION OF PROBLEM LIST AND OVERVIEW/CONTROLLED SUBSTANCE AGREEMENT    Last Written Prescription Date:  7/7/20  Last Fill Quantity: 43,   # refills: 0    Last [place] [facility] [place]: 4/22/20    Future Office visit:     Controlled substance agreement:   Encounter-Level CSA:    There are no encounter-level csa.     Patient-Level CSA:    There are no patient-level csa.         Last Urine Drug Screen: No results found for: CDAUT, No results found for: COMDAT, No results found for: THC13, PCP13, COC13, MAMP13, OPI13, AMP13, BZO13, TCA13, MTD13, BAR13, OXY13, PPX13, BUP13       [email or web address]     checked in past 3 months?  Yes 7/7/20

## 2020-09-14 DIAGNOSIS — M79.7 FIBROMYALGIA: ICD-10-CM

## 2020-09-14 DIAGNOSIS — R60.0 BILATERAL EDEMA OF LOWER EXTREMITY: ICD-10-CM

## 2020-09-14 RX ORDER — TRAMADOL HYDROCHLORIDE 50 MG/1
TABLET ORAL
Qty: 43 TABLET | Refills: 0 | Status: SHIPPED | OUTPATIENT
Start: 2020-09-14 | End: 2020-10-30

## 2020-09-16 RX ORDER — HYDROCHLOROTHIAZIDE 50 MG/1
50 TABLET ORAL DAILY
Qty: 90 TABLET | Refills: 0 | Status: SHIPPED | OUTPATIENT
Start: 2020-09-16 | End: 2021-04-29

## 2020-09-28 ENCOUNTER — TELEPHONE (OUTPATIENT)
Dept: INTERNAL MEDICINE | Facility: CLINIC | Age: 80
End: 2020-09-28

## 2020-09-28 DIAGNOSIS — J01.01 ACUTE RECURRENT MAXILLARY SINUSITIS: Primary | ICD-10-CM

## 2020-09-28 NOTE — TELEPHONE ENCOUNTER
Patient calling  She thinks she has a sinus infection. She has been using her nasal spray. Green mucus and she has had this for 2 weeks. Patient stated Dr Santiago gives me antibiotics when this happens. Please advise. Ok to call and  [phone]

## 2020-09-29 RX ORDER — DOXYCYCLINE HYCLATE 100 MG
100 TABLET ORAL 2 TIMES DAILY
Qty: 20 TABLET | Refills: 0 | Status: SHIPPED | OUTPATIENT
Start: 2020-09-29 | End: 2021-05-05

## 2020-09-29 NOTE — TELEPHONE ENCOUNTER
"Patient is calling because she has not heard back from us. Writer advised an appointment but she said this is a \"ongoing\" problem so she is asking for a rx.  "

## 2020-10-29 DIAGNOSIS — M79.7 FIBROMYALGIA: ICD-10-CM

## 2020-10-29 NOTE — TELEPHONE ENCOUNTER
Controlled Substance Refill Request for tramadol  Problem List Complete:  Yes    Last Written Prescription Date:  9/14/20  Last Fill Quantity: 43,   # refills: 0    THE MOST RECENT OFFICE VISIT MUST BE WITHIN THE PAST 3 MONTHS. AT LEAST ONE FACE TO FACE VISIT MUST OCCUR EVERY 6 MONTHS. ADDITIONAL VISITS CAN BE VIRTUAL.  (THIS STATEMENT SHOULD BE DELETED.)    Last Office Visit with [facility] primary care provider: 4/22/20    Future Office visit:     Controlled substance agreement:   Encounter-Level CSA:    There are no encounter-level csa.     Patient-Level CSA:    There are no patient-level csa.         Last Urine Drug Screen: No results found for: CDAUT, No results found for: COMDAT, No results found for: THC13, PCP13, COC13, MAMP13, OPI13, AMP13, BZO13, TCA13, MTD13, BAR13, OXY13, PPX13, BUP13     Processing:  Rx to be electronically transmitted to pharmacy by provider     [email or web address]   checked in past 3 months?  Yes checked today.  No concerns.

## 2020-10-30 RX ORDER — TRAMADOL HYDROCHLORIDE 50 MG/1
TABLET ORAL
Qty: 43 TABLET | Refills: 0 | Status: SHIPPED | OUTPATIENT
Start: 2020-10-30 | End: 2020-11-25

## 2020-11-23 DIAGNOSIS — M79.7 FIBROMYALGIA: ICD-10-CM

## 2020-11-25 RX ORDER — TRAMADOL HYDROCHLORIDE 50 MG/1
TABLET ORAL
Qty: 43 TABLET | Refills: 0 | Status: SHIPPED | OUTPATIENT
Start: 2020-11-25 | End: 2020-12-22

## 2020-11-25 NOTE — TELEPHONE ENCOUNTER
Controlled Substance Refill Request for Tramadol 50 mg  Problem List Complete:  No     PROVIDER TO CONSIDER COMPLETION OF PROBLEM LIST AND OVERVIEW/CONTROLLED SUBSTANCE AGREEMENT    Last Written Prescription Date:  10/30/20  Last Fill Quantity: 43,   # refills: 0    Last Office Visit with [facility] primary care provider: 4/22/2020    Future Office visit: none    Controlled substance agreement:   Encounter-Level CSA:    There are no encounter-level csa.     Patient-Level CSA:    There are no patient-level csa.         Last Urine Drug Screen: No results found for: CDAUT, No results found for: COMDAT, No results found for: THC13, PCP13, COC13, MAMP13, OPI13, AMP13, BZO13, TCA13, MTD13, BAR13, OXY13, PPX13, BUP13     Processing:  Rx to be electronically transmitted to pharmacy by provider      [email or web address]      RX monitoring program ([place]) reviewed:  reviewed- no concerns

## 2020-12-03 ENCOUNTER — VIRTUAL VISIT (OUTPATIENT)
Dept: INTERNAL MEDICINE | Facility: CLINIC | Age: 80
End: 2020-12-03
Payer: MEDICARE

## 2020-12-03 DIAGNOSIS — Z20.822 EXPOSURE TO COVID-19 VIRUS: Primary | ICD-10-CM

## 2020-12-03 NOTE — PROGRESS NOTES
"Nubia Coronel is a 80 year old female who is being evaluated via a billable video visit.      The patient has been notified of following:     \"This video visit will be conducted via a call between you and your physician/provider. We have found that certain health care needs can be provided without the need for an in-person physical exam.  This service lets us provide the care you need with a video conversation.  If a prescription is necessary we can send it directly to your pharmacy.  If lab work is needed we can place an order for that and you can then stop by our lab to have the test done at a later time.    Video visits are billed at different rates depending on your insurance coverage.  Please reach out to your insurance provider with any questions.    If during the course of the call the physician/provider feels a video visit is not appropriate, you will not be charged for this service.\"    Patient has given verbal consent for Video visit? Yes  How would you like to obtain your AVS? MyChart  If you are dropped from the video visit, the video invite should be resent to: Text to cell phone: [phone]  Will anyone else be joining your video visit? No      Subjective     Nubia Coronel is a 80 year old female who presents today via video visit for the following health issues:    HPI       Concern for COVID-19--had exposure 11/28/2020-  About how many days ago did these symptoms start? n/a  Is this your first visit for this illness? Yes  In the 14 days before your symptoms started, have you had close contact with someone with COVID-19 (Coronavirus)? Yes, I have been in contact with someone who has symptoms of COVID-19/Coronavirus (no lab test done or waiting on results).  Do you have a fever or chills? No  Are you having new or worsening difficulty breathing? No  Do you have new or worsening cough? No  Have you had any new or unexplained body aches? No    Have you experienced any of the " following NEW symptoms?    Headache: No    Sore throat: No    Loss of taste or smell: No    Chest pain: No    Diarrhea: No    Rash: No  What treatments have you tried? n/a  Who do you live with?   Are you, or a household member, a healthcare worker or a ? No  Do you live in a nursing home, group home, or shelter? No  Do you have a way to get food/medications if quarantined? Yes, I have a friend or family member who can help me.          Video Start Time: 236      Review of Systems   Constitutional, HEENT, cardiovascular, pulmonary, gi and gu systems are negative, except as otherwise noted.      Objective           Vitals:  No vitals were obtained today due to virtual visit.    Physical Exam     GENERAL: Healthy, alert and no distress  SKIN: Visible skin clear. No significant rash, abnormal pigmentation or lesions.  NEURO: Cranial nerves grossly intact.  Mentation and speech appropriate for age.  PSYCH: Mentation appears normal, affect normal/bright, judgement and insight intact, normal speech and appearance well-groomed.              Assessment & Plan     Exposure to COVID-19 virus  - Asymptomatic COVID-19 Virus (Coronavirus) by PCR; Future        See Patient Instructions    No follow-ups on file.    Filemon Best MD  [facility]      Video-Visit Details    Type of service:  Video Visit    Video End Time:244    Originating Location (pt. Location): Home    Distant Location (provider location):  [facility]     Platform used for Video Visit: Enjoyor

## 2020-12-21 DIAGNOSIS — M79.7 FIBROMYALGIA: ICD-10-CM

## 2020-12-22 RX ORDER — TRAMADOL HYDROCHLORIDE 50 MG/1
TABLET ORAL
Qty: 43 TABLET | Refills: 0 | Status: SHIPPED | OUTPATIENT
Start: 2020-12-22 | End: 2021-01-06

## 2020-12-22 NOTE — TELEPHONE ENCOUNTER
Controlled Substance Refill Request for Tramadol  Problem List Complete:  No     PROVIDER TO CONSIDER COMPLETION OF PROBLEM LIST AND OVERVIEW/CONTROLLED SUBSTANCE AGREEMENT    Last Written Prescription Date:  11/25/20  Last Fill Quantity: 43,   # refills: 0    THE MOST RECENT OFFICE VISIT MUST BE WITHIN THE PAST 3 MONTHS. AT LEAST ONE FACE TO FACE VISIT MUST OCCUR EVERY 6 MONTHS. ADDITIONAL VISITS CAN BE VIRTUAL.  (THIS STATEMENT SHOULD BE DELETED.)    Last Office Visit with [facility] primary care provider: 4/22/20 virtual    Future Office visit:     Controlled substance agreement:   Encounter-Level CSA:    There are no encounter-level csa.     Patient-Level CSA:    There are no patient-level csa.         Last Urine Drug Screen: No results found for: CDAUT, No results found for: COMDAT, No results found for: THC13, PCP13, COC13, MAMP13, OPI13, AMP13, BZO13, TCA13, MTD13, BAR13, OXY13, PPX13, BUP13     Processing:  Rx to be electronically transmitted to pharmacy by provider      [email or web address]       checked in past 3 months?  Yes 11/25/20  No concerns.    Please advise, thanks.

## 2021-01-04 ENCOUNTER — TELEPHONE (OUTPATIENT)
Dept: INTERNAL MEDICINE | Facility: CLINIC | Age: 81
End: 2021-01-04

## 2021-01-04 DIAGNOSIS — M79.7 FIBROMYALGIA: ICD-10-CM

## 2021-01-04 NOTE — TELEPHONE ENCOUNTER
Patient would like to transfer the RX for traMADol (ULTRAM) to the [facility] pharmacy in [facility] ([phone]). She did not  the RX that was sent on 12/22/20.

## 2021-01-06 RX ORDER — TRAMADOL HYDROCHLORIDE 50 MG/1
TABLET ORAL
Qty: 43 TABLET | Refills: 0 | Status: SHIPPED | OUTPATIENT
Start: 2021-01-06 | End: 2021-02-09

## 2021-01-06 RX ORDER — TRAMADOL HYDROCHLORIDE 50 MG/1
TABLET ORAL
Qty: 43 TABLET | Refills: 0 | Status: SHIPPED | OUTPATIENT
Start: 2021-01-06 | End: 2021-01-06

## 2021-01-06 NOTE — TELEPHONE ENCOUNTER
Patient wants RX to go to [facility] not [facility] ( see previous encounter).     [facility]-- [facility] not [facility].      Needs resent to [facility] in [facility]-- see previous encounter for address and number      Ladan Moyer

## 2021-01-06 NOTE — TELEPHONE ENCOUNTER
Advised patient that the phone number given was for a [facility] so that's where the rx was sent, not [facility].  [facility] [address] [facility].  Patient clarified which one she wanted to use.      Please send 1 last Tramadol to the pharmacy loaded.  DEANNE Mejias R.N.

## 2021-01-14 ENCOUNTER — HEALTH MAINTENANCE LETTER (OUTPATIENT)
Age: 81
End: 2021-01-14

## 2021-01-17 ENCOUNTER — HEALTH MAINTENANCE LETTER (OUTPATIENT)
Age: 81
End: 2021-01-17

## 2021-02-01 DIAGNOSIS — F51.01 PRIMARY INSOMNIA: ICD-10-CM

## 2021-02-01 DIAGNOSIS — G43.019 INTRACTABLE MIGRAINE WITHOUT AURA AND WITHOUT STATUS MIGRAINOSUS: ICD-10-CM

## 2021-02-02 RX ORDER — TOPIRAMATE 25 MG/1
TABLET, FILM COATED ORAL
Qty: 360 TABLET | Refills: 0 | Status: SHIPPED | OUTPATIENT
Start: 2021-02-02 | End: 2021-04-29

## 2021-02-02 RX ORDER — TRAZODONE HYDROCHLORIDE 50 MG/1
50 TABLET, FILM COATED ORAL AT BEDTIME
Qty: 90 TABLET | Refills: 3 | Status: SHIPPED | OUTPATIENT
Start: 2021-02-02 | End: 2023-05-09

## 2021-02-09 DIAGNOSIS — M79.7 FIBROMYALGIA: ICD-10-CM

## 2021-02-09 DIAGNOSIS — G43.019 INTRACTABLE MIGRAINE WITHOUT AURA AND WITHOUT STATUS MIGRAINOSUS: ICD-10-CM

## 2021-02-09 RX ORDER — TRAMADOL HYDROCHLORIDE 50 MG/1
TABLET ORAL
Qty: 43 TABLET | Refills: 0 | Status: SHIPPED | OUTPATIENT
Start: 2021-02-09 | End: 2021-03-18

## 2021-02-09 RX ORDER — BUTALBITAL, ACETAMINOPHEN AND CAFFEINE 50; 325; 40 MG/1; MG/1; MG/1
1 TABLET ORAL DAILY PRN
Qty: 20 TABLET | Refills: 0 | Status: SHIPPED | OUTPATIENT
Start: 2021-02-09 | End: 2022-05-10

## 2021-02-09 NOTE — TELEPHONE ENCOUNTER
Pt calling for refill request below. She is requesting a quantity of 20 instead of 30 for insurance reason. Writer changed quantity to 20.    Fioricet      Last Written Prescription Date:  2/5/20  Last Fill Quantity: 30,   # refills: 0  Last Office Visit: 44/22/20  Future Office visit:       Routing refill request to provider for review/approval because:  Drug not on the [facility], [facility] or [facility] refill protocol or controlled substance

## 2021-03-17 DIAGNOSIS — M79.7 FIBROMYALGIA: ICD-10-CM

## 2021-03-18 RX ORDER — TRAMADOL HYDROCHLORIDE 50 MG/1
TABLET ORAL
Qty: 43 TABLET | Refills: 0 | Status: SHIPPED | OUTPATIENT
Start: 2021-03-18 | End: 2021-04-20

## 2021-03-18 NOTE — TELEPHONE ENCOUNTER
Controlled Substance Refill Request for Tramadol  Problem List Complete:  No     PROVIDER TO CONSIDER COMPLETION OF PROBLEM LIST AND OVERVIEW/CONTROLLED SUBSTANCE AGREEMENT    Last Written Prescription Date:  2/9/21  Last Fill Quantity: 43,   # refills: 0    Last Office Visit with [facility] primary care provider: 12/3/20    Future Office visit:     Controlled substance agreement:   Encounter-Level CSA:    There are no encounter-level csa.     Patient-Level CSA:    There are no patient-level csa.         Last Urine Drug Screen: No results found for: CDAUT, No results found for: COMDAT, No results found for: THC13, PCP13, COC13, MAMP13, OPI13, AMP13, BZO13, TCA13, MTD13, BAR13, OXY13, PPX13, BUP13    RX [place] ([place]) reviewed:  reviewed- no concerns  [place] profile:  [email or web address]

## 2021-04-09 DIAGNOSIS — M79.7 FIBROMYALGIA: ICD-10-CM

## 2021-04-12 NOTE — TELEPHONE ENCOUNTER
Pending Prescriptions:                       Disp   Refills    traMADol (ULTRAM) 50 MG tablet [Pharmacy M*43 tab*0        Sig: TAKE 1 TABLET BY MOUTH ONCE DAILY IN THE MORNING AND           1 IN THE EVENING ON  TUESDAY,  THURSDAY  AND            SATURDAY    Routing refill request to provider for review/approval because:  Drug not on the [facility] refill protocol

## 2021-04-14 NOTE — TELEPHONE ENCOUNTER
Patient calling to check on the status of the medication.      Routed to covering provider for today.

## 2021-04-14 NOTE — TELEPHONE ENCOUNTER
Covering provider for PCP.   reviewed.   shows last time tramadol was given on 11/25/2020 and patient filled on 12/1/2020.  I am not able to see any other prescriptions refilled after December.  Also according to the chart shows last tramadol prescription was given on 3/18/2021.      Again if patient is out of state, I do not have other state license to prescribe controlled substance.    Brionna Puri MD

## 2021-04-20 RX ORDER — TRAMADOL HYDROCHLORIDE 50 MG/1
TABLET ORAL
Qty: 43 TABLET | Refills: 0 | Status: SHIPPED | OUTPATIENT
Start: 2021-04-20 | End: 2021-08-02

## 2021-04-20 NOTE — TELEPHONE ENCOUNTER
Patient calling. She has gone without since Saturday. She wants to know if someone would send at least a couple of pills until Dr Santiago gets in tomorrow. Patient was told Dr Santiago would be in on Monday at take care of this. Please advise. Ok to call and  [phone]

## 2021-04-21 ENCOUNTER — TELEPHONE (OUTPATIENT)
Dept: INTERNAL MEDICINE | Facility: CLINIC | Age: 81
End: 2021-04-21

## 2021-04-21 DIAGNOSIS — M79.7 FIBROMYALGIA: ICD-10-CM

## 2021-04-21 RX ORDER — TRAMADOL HYDROCHLORIDE 50 MG/1
TABLET ORAL
Qty: 43 TABLET | Refills: 0 | Status: CANCELLED | OUTPATIENT
Start: 2021-04-21

## 2021-04-21 NOTE — TELEPHONE ENCOUNTER
Patient calling and traMADol (ULTRAM) 50 MG tablet did not reach the pharmacy. Transmission failed. Please call pharmacy or resend  Ok to call and  [phone]

## 2021-04-22 NOTE — TELEPHONE ENCOUNTER
Received notification prescription did not go through. Call to pharmacy. Prescription was not received. Verbal order given.

## 2021-04-26 DIAGNOSIS — R60.0 BILATERAL EDEMA OF LOWER EXTREMITY: ICD-10-CM

## 2021-04-26 DIAGNOSIS — J30.2 SEASONAL ALLERGIC RHINITIS, UNSPECIFIED TRIGGER: ICD-10-CM

## 2021-04-26 DIAGNOSIS — G43.019 INTRACTABLE MIGRAINE WITHOUT AURA AND WITHOUT STATUS MIGRAINOSUS: ICD-10-CM

## 2021-04-29 RX ORDER — TOPIRAMATE 25 MG/1
TABLET, FILM COATED ORAL
Qty: 360 TABLET | Refills: 0 | Status: SHIPPED | OUTPATIENT
Start: 2021-04-29 | End: 2021-06-10

## 2021-04-29 RX ORDER — HYDROCHLOROTHIAZIDE 50 MG/1
TABLET ORAL
Qty: 90 TABLET | Refills: 0 | Status: SHIPPED | OUTPATIENT
Start: 2021-04-29 | End: 2021-06-10

## 2021-04-29 RX ORDER — IPRATROPIUM BROMIDE 21 UG/1
SPRAY, METERED NASAL
Qty: 30 ML | Refills: 0 | Status: SHIPPED | OUTPATIENT
Start: 2021-04-29 | End: 2021-08-10

## 2021-04-29 NOTE — TELEPHONE ENCOUNTER
Pending Prescriptions:                       Disp   Refills    ipratropium (ATROVENT) 0.03 % nasal spray *30 mL  0        Sig: USE 2 SPRAYS IN EACH NOSTRIL EVERY 12 HOURS AS NEEDED           FOR RHINITIS    hydrochlorothiazide (HYDRODIURIL) 50 MG ta*90 tab*0        Sig: TAKE 1 TABLET EVERY DAY    topiramate (TOPAMAX) 25 MG tablet [Pharmac*360 ta*0        Sig: TAKE 1 TABLET IN THE MORNING  AND 3 TABLETS IN THE           EVENING    Routing refill request to provider for review/approval because:  A break in medication, patient fails protocol

## 2021-05-05 ENCOUNTER — OFFICE VISIT (OUTPATIENT)
Dept: INTERNAL MEDICINE | Facility: CLINIC | Age: 81
End: 2021-05-05
Payer: MEDICARE

## 2021-05-05 VITALS
OXYGEN SATURATION: 94 % | WEIGHT: 155 LBS | SYSTOLIC BLOOD PRESSURE: 122 MMHG | RESPIRATION RATE: 16 BRPM | DIASTOLIC BLOOD PRESSURE: 70 MMHG | BODY MASS INDEX: 26.46 KG/M2 | HEIGHT: 64 IN | TEMPERATURE: 98 F | HEART RATE: 80 BPM

## 2021-05-05 DIAGNOSIS — R60.0 BILATERAL EDEMA OF LOWER EXTREMITY: ICD-10-CM

## 2021-05-05 DIAGNOSIS — J32.8 OTHER CHRONIC SINUSITIS: ICD-10-CM

## 2021-05-05 DIAGNOSIS — G89.4 PAIN SYNDROME, CHRONIC: ICD-10-CM

## 2021-05-05 DIAGNOSIS — M79.7 FIBROMYALGIA: Primary | ICD-10-CM

## 2021-05-05 DIAGNOSIS — G43.019 INTRACTABLE MIGRAINE WITHOUT AURA AND WITHOUT STATUS MIGRAINOSUS: ICD-10-CM

## 2021-05-05 LAB
ANION GAP SERPL CALCULATED.3IONS-SCNC: 4 MMOL/L (ref 3–14)
BUN SERPL-MCNC: 15 MG/DL (ref 7–30)
CALCIUM SERPL-MCNC: 10 MG/DL (ref 8.5–10.1)
CHLORIDE SERPL-SCNC: 101 MMOL/L (ref 94–109)
CO2 SERPL-SCNC: 34 MMOL/L (ref 20–32)
CREAT SERPL-MCNC: 0.8 MG/DL (ref 0.52–1.04)
GFR SERPL CREATININE-BSD FRML MDRD: 69 ML/MIN/{1.73_M2}
GLUCOSE SERPL-MCNC: 146 MG/DL (ref 70–99)
POTASSIUM SERPL-SCNC: 3.2 MMOL/L (ref 3.4–5.3)
SODIUM SERPL-SCNC: 139 MMOL/L (ref 133–144)

## 2021-05-05 PROCEDURE — 99214 OFFICE O/P EST MOD 30 MIN: CPT | Performed by: INTERNAL MEDICINE

## 2021-05-05 PROCEDURE — 80048 BASIC METABOLIC PNL TOTAL CA: CPT | Performed by: INTERNAL MEDICINE

## 2021-05-05 PROCEDURE — 36415 COLL VENOUS BLD VENIPUNCTURE: CPT | Performed by: INTERNAL MEDICINE

## 2021-05-05 ASSESSMENT — MIFFLIN-ST. JEOR: SCORE: 1150.14

## 2021-05-05 NOTE — PATIENT INSTRUCTIONS
Plan:  1. Pain clinic referral   2. Continue Flonase and Zytec for the sinuses congestion  3. Boil water and breath the warm vapors 2-3 times a day to try to open up the sinuses.   4. ENT referral N: [facility] [phone]  [address]

## 2021-05-05 NOTE — PROGRESS NOTES
Patient's instructions / PLAN:                                                        Plan:  1. Pain clinic referral  -Dr. Tucker would not refill the tramadol  2. Continue Flonase and Zytec for the sinuses congestion  3. Boil water and breath the warm vapors 2-3 times a day to try to open up the sinuses.   4. ENT referral [facility] [phone]  [address] [facility]  [address] [facility]        ASSESSMENT & PLAN:                                                      (M79.7) Fibromyalgia  (primary encounter diagnosis)  (G89.4) Pain syndrome, chronic  Comment:   Plan: PAIN MANAGEMENT REFERRAL      (R60.0) Bilateral edema of lower extremity  Comment: Controlled with hydrochlorothiazide  Plan: Basic metabolic panel            (J32.8) Other chronic sinusitis  Comment:   Plan: OTOLARYNGOLOGY REFERRAL        as above      (G43.019) Intractable migraine without aura and without status migrainosus  Comment:   Plan: On chronic Topamax treatment      Chief Complaint:                                                        Follow up chronic medical problems      SUBJECTIVE:                                                    History of present illness     Patient is on chronic pain occasion, tramadol.  I explained the patient I do not prescribe chronic pain medication.  I offered her to cancel the appointment.  She prefers to go to a pain clinic for the tramadol and keep today's appointment with me.    She has fibromyalgia with chronic pain syndrome.    She does her annual exam with OBGYN    Sinuses congestion   --She had sinuses surgery in 2002, but her symptoms recurred 2 to 3 years later  -- she uses Flonase, it helps but it is not resolved completely  -- uses Zyrtec     Insurance doesn't let me check lipid profile. Prior lipid profile was normal    ROS:                                                      ROS: negative for fever, chills, cough, wheezes,  "chest pain, shortness of breath, vomiting, abdominal pain, leg swelling     OBJECTIVE:                                                    Physical Exam :    Blood pressure 122/70, pulse 80, temperature 98  F (36.7  C), temperature source Oral, resp. rate 16, height 1.613 m (5' 3.5\"), weight 70.3 kg (155 lb), SpO2 94 %, not currently breastfeeding.   NAD, appears comfortable  Skin: no rashes   Neck: supple, no JVD, No thyroidmegaly. Lymph nodes nonpalpable cervical and supraclavicular.  Chest: clear to auscultation bilaterally, good respiratory effort  Heart: S1 S2, RRR, no mgr appreciated  Abdomen: soft, not tender,   Extremities: no edema,  Neurologic: A, Ox3, no focal signs appreciated    PMHx: reviewed  Past Medical History:   Diagnosis Date     Fibromyalgia      Myofacial muscle pain     having myofacial tx      Osteoarthrosis, unspecified whether generalized or localized, other specified sites     Cervical FRANCISCO - Dr. Dalia Huynh     Prediabetes 12/17/2019      PSHx: reviewed  Past Surgical History:   Procedure Laterality Date     APPENDECTOMY       CHOLECYSTECTOMY       COLONOSCOPY  2/16/2012    Procedure:COLONOSCOPY; COLONOSCOPY; Surgeon:ELIZABETH SINGH; Location: GI     GYN SURGERY      hysterectomy     HEAD & NECK SURGERY      sinus surgery     ORTHOPEDIC SURGERY      bunions     Z NONSPECIFIC PROCEDURE  12/98    Negative MRI of the left shoulder     Z NONSPECIFIC PROCEDURE  1/99    Normal bone density scan     ZZ NONSPECIFIC PROCEDURE      Normal pap and mammos     ZZ NONSPECIFIC PROCEDURE  9/01    Negative brain MRI     ZZC NONSPECIFIC PROCEDURE      Negative liver biopy     ZZ NONSPECIFIC PROCEDURE      S/P cholecystectomy     ZZC NONSPECIFIC PROCEDURE      S/P hysterectomy     ZZC NONSPECIFIC PROCEDURE      S/P appendectomy     ZZC NONSPECIFIC PROCEDURE      Negative breast biopsy        Meds: reviewed  Current Outpatient Medications   Medication Sig Dispense Refill     Alpha-Lipoic Acid 300 " MG TABS Take by mouth 3 times daily.        butalbital-acetaminophen-caffeine (ESGIC) -40 MG tablet Take 1 tablet by mouth daily as needed for headaches 20 tablet 0     cetirizine (ZYRTEC) 10 MG tablet Take 10 mg by mouth daily       Collagen-Boron-Hyaluronic Acid ([employer] TRIPLE ACTION PO) Take 1 capsule by mouth daily       cyanocobalamin (VITAMIN B-12) 1000 MCG tablet Take 1 tablet (1,000 mcg) by mouth daily 90 tablet 0     esomeprazole (NEXIUM) 20 MG DR capsule Take 20 mg by mouth every morning (before breakfast) Take 30-60 minutes before eating.       hydrochlorothiazide (HYDRODIURIL) 50 MG tablet TAKE 1 TABLET EVERY DAY 90 tablet 0     ipratropium (ATROVENT) 0.03 % nasal spray USE 2 SPRAYS IN EACH NOSTRIL EVERY 12 HOURS AS NEEDED FOR RHINITIS 30 mL 0     Levocarnitine 500 MG CAPS Take  by mouth. TID       Multiple Vitamins-Minerals (QC WOMENS DAILY MULTIVITAMIN PO) Take by mouth 2 times daily       topiramate (TOPAMAX) 25 MG tablet TAKE 1 TABLET IN THE MORNING  AND 3 TABLETS IN THE EVENING 360 tablet 0     traMADol (ULTRAM) 50 MG tablet TAKE 1 TABLET BY MOUTH ONCE DAILY IN THE MORNING AND 1 IN THE EVENING ON  TUESDAY,  THURSDAY  AND  SATURDAY 43 tablet 0     traZODone (DESYREL) 50 MG tablet TAKE 1 TABLET (50 MG) BY MOUTH AT BEDTIME 90 tablet 3     TUMS 500 OR 2 tabs q pm       VITAMIN D 1000 UNIT OR TABS 1 daily.          Soc Hx: reviewed  Fam Hx: reviewed          Gloria Tucker MD  Internal Medicine

## 2021-05-06 ENCOUNTER — TRANSFERRED RECORDS (OUTPATIENT)
Dept: HEALTH INFORMATION MANAGEMENT | Facility: CLINIC | Age: 81
End: 2021-05-06

## 2021-05-06 ENCOUNTER — TELEPHONE (OUTPATIENT)
Dept: INTERNAL MEDICINE | Facility: CLINIC | Age: 81
End: 2021-05-06

## 2021-05-06 DIAGNOSIS — R60.0 BILATERAL EDEMA OF LOWER EXTREMITY: Primary | ICD-10-CM

## 2021-05-06 RX ORDER — TRIAMTERENE AND HYDROCHLOROTHIAZIDE 37.5; 25 MG/1; MG/1
1 CAPSULE ORAL EVERY MORNING
Qty: 30 CAPSULE | Refills: 1 | Status: SHIPPED | OUTPATIENT
Start: 2021-05-06 | End: 2021-06-10

## 2021-05-06 NOTE — TELEPHONE ENCOUNTER
Please call the patient.  Her potassium is on the low side.  Recommendation  --Stop hydrochlorothiazide  --Start triamterene hydrochlorothiazide 1 capsule daily  --Schedule nonfasting blood test in 4 to 6 weeks  --Schedule office visit few days after the labs

## 2021-05-11 ENCOUNTER — TRANSFERRED RECORDS (OUTPATIENT)
Dept: HEALTH INFORMATION MANAGEMENT | Facility: CLINIC | Age: 81
End: 2021-05-11

## 2021-05-19 ENCOUNTER — TRANSFERRED RECORDS (OUTPATIENT)
Dept: HEALTH INFORMATION MANAGEMENT | Facility: CLINIC | Age: 81
End: 2021-05-19

## 2021-06-07 DIAGNOSIS — R60.0 BILATERAL EDEMA OF LOWER EXTREMITY: ICD-10-CM

## 2021-06-07 LAB
ANION GAP SERPL CALCULATED.3IONS-SCNC: 5 MMOL/L (ref 3–14)
BUN SERPL-MCNC: 15 MG/DL (ref 7–30)
CALCIUM SERPL-MCNC: 9.5 MG/DL (ref 8.5–10.1)
CHLORIDE SERPL-SCNC: 108 MMOL/L (ref 94–109)
CO2 SERPL-SCNC: 28 MMOL/L (ref 20–32)
CREAT SERPL-MCNC: 0.73 MG/DL (ref 0.52–1.04)
GFR SERPL CREATININE-BSD FRML MDRD: 77 ML/MIN/{1.73_M2}
GLUCOSE SERPL-MCNC: 190 MG/DL (ref 70–99)
POTASSIUM SERPL-SCNC: 3.8 MMOL/L (ref 3.4–5.3)
SODIUM SERPL-SCNC: 141 MMOL/L (ref 133–144)

## 2021-06-07 PROCEDURE — 36415 COLL VENOUS BLD VENIPUNCTURE: CPT | Performed by: INTERNAL MEDICINE

## 2021-06-07 PROCEDURE — 80048 BASIC METABOLIC PNL TOTAL CA: CPT | Performed by: INTERNAL MEDICINE

## 2021-06-10 ENCOUNTER — OFFICE VISIT (OUTPATIENT)
Dept: INTERNAL MEDICINE | Facility: CLINIC | Age: 81
End: 2021-06-10
Payer: MEDICARE

## 2021-06-10 ENCOUNTER — ANCILLARY PROCEDURE (OUTPATIENT)
Dept: GENERAL RADIOLOGY | Facility: CLINIC | Age: 81
End: 2021-06-10
Attending: INTERNAL MEDICINE
Payer: MEDICARE

## 2021-06-10 VITALS
WEIGHT: 158.6 LBS | DIASTOLIC BLOOD PRESSURE: 75 MMHG | HEIGHT: 64 IN | OXYGEN SATURATION: 99 % | RESPIRATION RATE: 18 BRPM | TEMPERATURE: 97.7 F | SYSTOLIC BLOOD PRESSURE: 122 MMHG | BODY MASS INDEX: 27.08 KG/M2 | HEART RATE: 89 BPM

## 2021-06-10 DIAGNOSIS — M79.7 FIBROMYALGIA: ICD-10-CM

## 2021-06-10 DIAGNOSIS — R60.0 BILATERAL EDEMA OF LOWER EXTREMITY: Primary | ICD-10-CM

## 2021-06-10 DIAGNOSIS — M25.531 RIGHT WRIST PAIN: ICD-10-CM

## 2021-06-10 DIAGNOSIS — G43.019 INTRACTABLE MIGRAINE WITHOUT AURA AND WITHOUT STATUS MIGRAINOSUS: ICD-10-CM

## 2021-06-10 PROBLEM — Z76.0 ENCOUNTER FOR MEDICATION REFILL: Status: ACTIVE | Noted: 2018-07-02

## 2021-06-10 PROCEDURE — 99214 OFFICE O/P EST MOD 30 MIN: CPT | Performed by: INTERNAL MEDICINE

## 2021-06-10 PROCEDURE — 73110 X-RAY EXAM OF WRIST: CPT | Mod: RT | Performed by: RADIOLOGY

## 2021-06-10 RX ORDER — HYDROCHLOROTHIAZIDE 50 MG/1
TABLET ORAL
Qty: 90 TABLET | Refills: 1 | Status: SHIPPED | OUTPATIENT
Start: 2021-06-10 | End: 2021-08-10

## 2021-06-10 RX ORDER — TOPIRAMATE 25 MG/1
TABLET, FILM COATED ORAL
Qty: 360 TABLET | Refills: 1 | Status: SHIPPED | OUTPATIENT
Start: 2021-06-10 | End: 2022-01-21

## 2021-06-10 ASSESSMENT — MIFFLIN-ST. JEOR: SCORE: 1166.46

## 2021-06-10 NOTE — PATIENT INSTRUCTIONS
Plan:  1.Tramadol - may reduce it to every other day for a week then stop it  2. Right wrist -  XRay today - [address]   3.  Ortho referral for the Right wrist - if not better  4. Voltaren gel - over the counter - 3 times a day for the wrist  5. Please make a lab appointment for fasting labs  In 4-5 months - to check the potassium   6. Please make an appointment few days after the labs to discuss about the results.

## 2021-06-10 NOTE — PROGRESS NOTES
Dr Tucker's note      Patient's instructions / PLAN:                                                        Plan:  1.Tramadol - may reduce it to every other day for a week then stop it  2. Right wrist -  XRay today - [address]   3.  Ortho referral for the Right wrist - if not better  4. Voltaren gel - over the counter - 3 times a day for the wrist  5. Please make a lab appointment for fasting labs  In 4-5 months - to check the potassium   6. Please make an appointment few days after the labs to discuss about the results.       ASSESSMENT & PLAN:                                                      (R60.0) Bilateral edema of lower extremity  (primary encounter diagnosis)  Comment:   Plan: hydrochlorothiazide (HYDRODIURIL) 50 MG tablet,        Comprehensive metabolic panel            (G43.019) Intractable migraine without aura and without status migrainosus  Comment: Controlled  Plan: topiramate (TOPAMAX) 25 MG tablet            (M25.531) Right wrist pain  Comment: 1 isolated joint.  Doubt RA  Plan: XR Wrist Right G[facility]E [facility]          Referral            (M79.7) Fibromyalgia  Comment:   Plan: Follow-up with the pain clinic  Tramadol as above       Chief complaint:                                                      Follow up chronic medical problems       SUBJECTIVE:                                                    History of present illness:    Labs June 7 -- Discussed    K back to normal     Leg edema  --- She wants to take hydrochlorothiazide 50 mg daily as she has been taking over last years.  -- K in normal range    Topamax/tramadol  -- she has read about serotonin Syndrome if Topamax is combined w Tranadol. She wants to stop the Tramadol   -- Topamax: 25 mg in am and 75 mg in the evening.  --The headaches are controlled with the Topamax    Fibromyalgia  -- f/u a pain clinic.  Tramadol was prescribed by the pain clinic    R jorgeits pain  -- x 5 weeks  --  Exam: mildly swelling  "  -- no trauma   -- the pain causes decrease strength and mobility  -- I wanted to prescribe short course Prednisone -- she has allergy    LOV    Plan:  1. Pain clinic referral  -Dr. Tucker would not refill the tramadol  2. Continue Flonase and Zytec for the sinuses congestion  3. Boil water and breath the warm vapors 2-3 times a day to try to open up the sinuses.   4. ENT referral N: [facility] [phone]  [address]          Hypertension Follow-up      Do you check your blood pressure regularly outside of the clinic? No     Are you following a low salt diet? Yes    Are your blood pressures ever more than 140 on the top number (systolic) OR more   than 90 on the bottom number (diastolic), for example 140/90? No      How many servings of fruits and vegetables do you eat daily?  2-3    On average, how many sweetened beverages do you drink each day (Examples: soda, juice, sweet tea, etc.  Do NOT count diet or artificially sweetened beverages)?   0    How many days per week do you exercise enough to make your heart beat faster? 3 or less    How many minutes a day do you exercise enough to make your heart beat faster? 20 - 29    How many days per week do you miss taking your medication? 0      Review of Systems:                                                      ROS: negative for fever, chills, cough, wheezes, chest pain, shortness of breath, vomiting, abdominal pain, leg swelling  OBJECTIVE:             Physical exam:  Blood pressure 122/75, pulse 89, temperature 97.7  F (36.5  C), temperature source Oral, resp. rate 18, height 1.613 m (5' 3.5\"), weight 71.9 kg (158 lb 9.6 oz), SpO2 99 %, not currently breastfeeding.     NAD, appears comfortable  Neurologic: A, Ox3, no focal signs appreciated  R wrist, mildly swollen on the dorsal aspect.  Left wrist and all the other hand joints without any swelling.    PMHx: " reviewed  Past Medical History:   Diagnosis Date     Fibromyalgia      Myofacial muscle pain     having myofacial tx      Osteoarthrosis, unspecified whether generalized or localized, other specified sites     Cervical FRANCISCO - Dr. Dalia Huynh     Prediabetes 12/17/2019      PSHx: reviewed  Past Surgical History:   Procedure Laterality Date     APPENDECTOMY       CHOLECYSTECTOMY       COLONOSCOPY  2/16/2012    Procedure:COLONOSCOPY; COLONOSCOPY; Surgeon:ELIZABETH SINGH; Location: GI     GYN SURGERY      hysterectomy     HEAD & NECK SURGERY      sinus surgery     ORTHOPEDIC SURGERY      bunions     [facility] NONSPECIFIC PROCEDURE  12/98    Negative MRI of the left shoulder     [facility] NONSPECIFIC PROCEDURE  1/99    Normal bone density scan     [facility] NONSPECIFIC PROCEDURE      Normal pap and mammos     [facility] NONSPECIFIC PROCEDURE  9/01    Negative brain MRI     Z NONSPECIFIC PROCEDURE      Negative liver biopy     Z NONSPECIFIC PROCEDURE      S/P cholecystectomy     Z NONSPECIFIC PROCEDURE      S/P hysterectomy     Z NONSPECIFIC PROCEDURE      S/P appendectomy     Z NONSPECIFIC PROCEDURE      Negative breast biopsy        Meds: reviewed  Current Outpatient Medications   Medication Sig Dispense Refill     Alpha-Lipoic Acid 300 MG TABS Take by mouth 3 times daily.        butalbital-acetaminophen-caffeine (ESGIC) -40 MG tablet Take 1 tablet by mouth daily as needed for headaches 20 tablet 0     cetirizine (ZYRTEC) 10 MG tablet Take 10 mg by mouth daily       Collagen-Boron-Hyaluronic Acid ([employer] TRIPLE ACTION PO) Take 1 capsule by mouth daily       cyanocobalamin (VITAMIN B-12) 1000 MCG tablet Take 1 tablet (1,000 mcg) by mouth daily 90 tablet 0     esomeprazole (NEXIUM) 20 MG DR capsule Take 20 mg by mouth every morning (before breakfast) Take 30-60 minutes before eating.       hydrochlorothiazide (HYDRODIURIL) 50 MG tablet TAKE 1 TABLET EVERY DAY 90 tablet 0     ipratropium (ATROVENT) 0.03 % nasal spray  USE 2 SPRAYS IN EACH NOSTRIL EVERY 12 HOURS AS NEEDED FOR RHINITIS 30 mL 0     Levocarnitine 500 MG CAPS Take  by mouth. TID       Multiple Vitamins-Minerals (QC WOMENS DAILY MULTIVITAMIN PO) Take by mouth 2 times daily       topiramate (TOPAMAX) 25 MG tablet TAKE 1 TABLET IN THE MORNING  AND 3 TABLETS IN THE EVENING 360 tablet 0     traMADol (ULTRAM) 50 MG tablet TAKE 1 TABLET BY MOUTH ONCE DAILY IN THE MORNING AND 1 IN THE EVENING ON  TUESDAY,  THURSDAY  AND  SATURDAY 43 tablet 0     traZODone (DESYREL) 50 MG tablet TAKE 1 TABLET (50 MG) BY MOUTH AT BEDTIME 90 tablet 3     triamterene-HCTZ (DYAZIDE) 37.5-25 MG capsule Take 1 capsule by mouth every morning 30 capsule 1     TUMS 500 OR 2 tabs q pm       VITAMIN D 1000 UNIT OR TABS 1 daily.          Soc Hx: reviewed  Fam Hx: reviewed          Gloria Tucker MD  Internal Medicine

## 2021-06-18 ENCOUNTER — OFFICE VISIT (OUTPATIENT)
Dept: ORTHOPEDICS | Facility: CLINIC | Age: 81
End: 2021-06-18
Attending: INTERNAL MEDICINE
Payer: MEDICARE

## 2021-06-18 VITALS — HEIGHT: 64 IN | WEIGHT: 158 LBS | BODY MASS INDEX: 26.98 KG/M2

## 2021-06-18 DIAGNOSIS — M25.531 RIGHT WRIST PAIN: ICD-10-CM

## 2021-06-18 DIAGNOSIS — M77.8 HAND TENDINITIS: ICD-10-CM

## 2021-06-18 DIAGNOSIS — M18.11 ARTHRITIS OF CARPOMETACARPAL (CMC) JOINT OF RIGHT THUMB: Primary | ICD-10-CM

## 2021-06-18 PROCEDURE — 99203 OFFICE O/P NEW LOW 30 MIN: CPT | Performed by: ORTHOPAEDIC SURGERY

## 2021-06-18 ASSESSMENT — MIFFLIN-ST. JEOR: SCORE: 1163.74

## 2021-06-18 NOTE — PROGRESS NOTES
HISTORY OF PRESENT ILLNESS:    Nubia Coronel is a 80 year old female who is seen in consultation at the request of Dr. Sifuentes for right wrist pain. Patient is right handed.    Present symptoms: Patient reports pain in right wrist began ~ 1 month ago. She denies acute injury or trauma. She notes that she woke up one morning with pain in the right wrist. Pain is located in right hand dorsal aspect. Pain increases with pronation, supination, opening jars. Pain improves with Advil and heat. Patient reports pain at worst is 9/10 and currently is 2/10.  Treatments tried to this point: advil, voltaren gel, XR 6/10/21  Orthopedic PMH: patient reports h/o wrist injury in 1988/1989 but is unsure laterality (non surgical), patient has fibromyalgia    Past Medical History:   Diagnosis Date     Fibromyalgia      Myofacial muscle pain     having myofacial tx      Osteoarthrosis, unspecified whether generalized or localized, other specified sites     Cervical PALAKD - Dr. Dalia Huynh     Prediabetes 12/17/2019       Past Surgical History:   Procedure Laterality Date     APPENDECTOMY       CHOLECYSTECTOMY       COLONOSCOPY  2/16/2012    Procedure:COLONOSCOPY; COLONOSCOPY; Surgeon:ELIZABETH SINGH Location: GI     GYN SURGERY      hysterectomy     HEAD & NECK SURGERY      sinus surgery     ORTHOPEDIC SURGERY      bunions     ZZ NONSPECIFIC PROCEDURE  12/98    Negative MRI of the left shoulder     ZZ NONSPECIFIC PROCEDURE  1/99    Normal bone density scan     ZZC NONSPECIFIC PROCEDURE      Normal pap and mammos     ZZC NONSPECIFIC PROCEDURE  9/01    Negative brain MRI     ZZC NONSPECIFIC PROCEDURE      Negative liver biopy     ZZC NONSPECIFIC PROCEDURE      S/P cholecystectomy     ZZC NONSPECIFIC PROCEDURE      S/P hysterectomy     ZZC NONSPECIFIC PROCEDURE      S/P appendectomy     ZZC NONSPECIFIC PROCEDURE      Negative breast biopsy       Family History   Problem Relation Age of Onset     C.A.D. Father            75 yo MI     Family History Negative Mother         born 191     Respiratory Brother          68 yo Emphysema (smoker)     Diabetes Brother      C.A.D. Brother         pacemaker     Blood Disease Sister         Factor 2 - blood clots,allergies     Family History Negative Son      Family History Negative Son      Family History Negative Son        Social History     Socioeconomic History     Marital status:      Spouse name: Not on file     Number of children: Not on file     Years of education: Not on file     Highest education level: Not on file   Occupational History     Not on file   Social Needs     Financial resource strain: Not on file     Food insecurity     Worry: Not on file     Inability: Not on file     Transportation needs     Medical: Not on file     Non-medical: Not on file   Tobacco Use     Smoking status: Former Smoker     Quit date: 10/1/1981     Years since quittin.7     Smokeless tobacco: Never Used   Substance and Sexual Activity     Alcohol use: Yes     Comment: social     Drug use: No     Sexual activity: Not Currently     Partners: Male   Lifestyle     Physical activity     Days per week: Not on file     Minutes per session: Not on file     Stress: Not on file   Relationships     Social connections     Talks on phone: Not on file     Gets together: Not on file     Attends Denominational service: Not on file     Active member of club or organization: Not on file     Attends meetings of clubs or organizations: Not on file     Relationship status: Not on file     Intimate partner violence     Fear of current or ex partner: Not on file     Emotionally abused: Not on file     Physically abused: Not on file     Forced sexual activity: Not on file   Other Topics Concern     Parent/sibling w/ CABG, MI or angioplasty before 65F 55M? Not Asked   Social History Narrative     Not on file       Current Outpatient Medications   Medication Sig Dispense Refill     Alpha-Lipoic Acid  "300 MG TABS Take by mouth 3 times daily.        butalbital-acetaminophen-caffeine (ESGIC) -40 MG tablet Take 1 tablet by mouth daily as needed for headaches 20 tablet 0     cetirizine (ZYRTEC) 10 MG tablet Take 10 mg by mouth daily       Collagen-Boron-Hyaluronic Acid ([employer] JO[employer] HEALTH TRIPLE ACTION PO) Take 1 capsule by mouth daily       cyanocobalamin (VITAMIN B-12) 1000 MCG tablet Take 1 tablet (1,000 mcg) by mouth daily 90 tablet 0     esomeprazole (NEXIUM) 20 MG DR capsule Take 20 mg by mouth every morning (before breakfast) Take 30-60 minutes before eating.       hydrochlorothiazide (HYDRODIURIL) 50 MG tablet TAKE 1 TABLET EVERY DAY 90 tablet 1     ipratropium (ATROVENT) 0.03 % nasal spray USE 2 SPRAYS IN EACH NOSTRIL EVERY 12 HOURS AS NEEDED FOR RHINITIS 30 mL 0     Levocarnitine 500 MG CAPS Take  by mouth. TID       Multiple Vitamins-Minerals (QC WOMENS DAILY MULTIVITAMIN PO) Take by mouth 2 times daily       topiramate (TOPAMAX) 25 MG tablet TAKE 1 TABLET IN THE MORNING  AND 3 TABLETS IN THE EVENING 360 tablet 1     traMADol (ULTRAM) 50 MG tablet TAKE 1 TABLET BY MOUTH ONCE DAILY IN THE MORNING AND 1 IN THE EVENING ON  TUESDAY,  THURSDAY  AND  SATURDAY 43 tablet 0     traZODone (DESYREL) 50 MG tablet TAKE 1 TABLET (50 MG) BY MOUTH AT BEDTIME 90 tablet 3     TUMS 500 OR 2 tabs q pm       VITAMIN D 1000 UNIT OR TABS 1 daily.          Allergies   Allergen Reactions     Cortisone Anaphylaxis     \"injected\"     Prednisolone Itching, Swelling and Rash     pretezone   Cannot use topical  prednisone       REVIEW OF SYSTEMS:  CONSTITUTIONAL:  NEGATIVE for fever, chills, change in weight  INTEGUMENTARY/SKIN:  NEGATIVE for worrisome rashes, moles or lesions  EYES:  NEGATIVE for vision changes or irritation  ENT/MOUTH:  NEGATIVE for ear, mouth and throat problems  RESP:  NEGATIVE for significant cough or SOB  BREAST:  NEGATIVE for masses, tenderness or discharge  CV:  NEGATIVE for chest pain, palpitations or " peripheral edema  GI:  NEGATIVE for nausea, abdominal pain, heartburn, or change in bowel habits  :  Negative   MUSCULOSKELETAL:  See HPI above  NEURO:  NEGATIVE for weakness, dizziness or paresthesias  ENDOCRINE:  NEGATIVE for temperature intolerance, skin/hair changes  HEME/ALLERGY/IMMUNE:  NEGATIVE for bleeding problems  PSYCHIATRIC:  NEGATIVE for changes in mood or affect      PHYSICAL EXAM:  There were no vitals taken for this visit.  There is no height or weight on file to calculate BMI.   GENERAL APPEARANCE: healthy, alert and no distress   HEENT: No apparent thyroid megaly. Clear sclera with normal ocular movement  RESPIRATORY: No labored breathing  SKIN: no suspicious lesions or rashes  NEURO: Normal strength and tone, mentation intact and speech normal  VASCULAR: Good pulses, and capillary refill   LYMPH: no lymphadenopathy   PSYCH:  mentation appears normal and affect normal/bright    MUSCULOSKELETAL:  Very young looking 80-year-old female not in acute distress  Normal gait    Symmetrical appearance of both hands with slight prominence at the thumb CMC joint  Even though the crepitus itself is not significant, she has a lot of pain with circumduction maneuver of the thumb at the CMC joints, bilateral    No focal tenderness at the A1 pulleys throughout both hands    No swelling or erythema in the dorsal aspect  No swelling of the MCP joints  Finkelstein test is negative    With a palpation, she does have distinct tenderness along the extensor tendon over the fourth ray  No subluxation of the extensor tendon is noted.  Grossly pinching and  strength is intact    Wrist itself is not swollen  No significant deformities in the IP joints     ASSESSMENT:  Bilateral thumb CMC joint DJD, right more symptomatic  Extensor tendinitis of the ring finger, right    PLAN:  The x-ray images of the right wrist of Usha 10, 2021 were visualized.  Presence of rather advanced thumb CMC joint DJD changes were pointed  out.  We talked about the treatment options of medication which she can start using over-the-counter anti-inflammatory medication versus cortisone injection and the surgical intervention as a last resort.    She thinks that she has allergic reaction to cortisone injection based on her experience from 50 years ago.  At this point, my recommendation is for her to go through allergy test for that.  We felt that very unlikely she has cortisone injection prophylactic reaction but more reaction to the impurity that was used 50 years ago.    In the meantime, she can also apply Voltaren gel that she uses for the knee on the right.  Icing 10 minutes 3-4 times is also recommended.    If she is interested in considering cortisone injection after allergy testing, she can come in for cortisone injections to the thumb CMC joints in the future.    As far as the tendinitis issue is concerned this was felt to be coming from her use of the hand related to the computer mouse.  Limiting her usage was recommended.    Follow-up as needed.      Imaging Interpretation:   None today      Jaxon Saeed MD  Department of Orthopedic Surgery        Disclaimer: This note consists of symbols derived from keyboarding, dictation and/or voice recognition software. As a result, there may be errors in the script that have gone undetected. Please consider this when interpreting information found in this chart.

## 2021-06-18 NOTE — PATIENT INSTRUCTIONS
Icing  Voltaren gel to the hand  Over-the-counter antacid medication for the next 2 weeks  Allergy test for cortisone  Limit the use of the right hand related to computer mouse  Follow-up as needed

## 2021-06-18 NOTE — LETTER
6/18/2021         RE: Nubia Coronel  [address] [address] [facility] [address] [facility] [address]        Dear Colleague,    Thank you for referring your patient, Nubia Coronel, to the [facility] [place]. Please see a copy of my visit note below.    HISTORY OF PRESENT ILLNESS:    Nubia Coronel is a 80 year old female who is seen in consultation at the request of Dr. Sifuentes for right wrist pain. Patient is right handed.    Present symptoms: Patient reports pain in right wrist began ~ 1 month ago. She denies acute injury or trauma. She notes that she woke up one morning with pain in the right wrist. Pain is located in right hand dorsal aspect. Pain increases with pronation, supination, opening jars. Pain improves with Advil and heat. Patient reports pain at worst is 9/10 and currently is 2/10.  Treatments tried to this point: advil, voltaren gel, XR 6/10/21  Orthopedic PMH: patient reports h/o wrist injury in 1988/1989 but is unsure laterality (non surgical), patient has fibromyalgia    Past Medical History:   Diagnosis Date     Fibromyalgia      Myofacial muscle pain     having myofacial tx      Osteoarthrosis, unspecified whether generalized or localized, other specified sites     Cervical PALAKD - Dr. Dalia Huynh     Prediabetes 12/17/2019       Past Surgical History:   Procedure Laterality Date     APPENDECTOMY       CHOLECYSTECTOMY       COLONOSCOPY  2/16/2012    Procedure:COLONOSCOPY; COLONOSCOPY; Surgeon:ELIZABETH SINGH; Location: GI     GYN SURGERY      hysterectomy     HEAD & NECK SURGERY      sinus surgery     ORTHOPEDIC SURGERY      bunions     Z NONSPECIFIC PROCEDURE  12/98    Negative MRI of the left shoulder     Z NONSPECIFIC PROCEDURE  1/99    Normal bone density scan     ZZ NONSPECIFIC PROCEDURE      Normal pap and mammos     ZZ NONSPECIFIC PROCEDURE  9/01    Negative brain MRI     ZZ NONSPECIFIC PROCEDURE      Negative liver biopy     [facility]  NONSPECIFIC PROCEDURE      S/P cholecystectomy     ZZC NONSPECIFIC PROCEDURE      S/P hysterectomy     ZZC NONSPECIFIC PROCEDURE      S/P appendectomy     ZZC NONSPECIFIC PROCEDURE      Negative breast biopsy       Family History   Problem Relation Age of Onset     C.A.D. Father           75 yo MI     Family History Negative Mother         born 191     Respiratory Brother          68 yo Emphysema (smoker)     Diabetes Brother      C.A.D. Brother         pacemaker     Blood Disease Sister         Factor 2 - blood clots,allergies     Family History Negative Son      Family History Negative Son      Family History Negative Son        Social History     Socioeconomic History     Marital status:      Spouse name: Not on file     Number of children: Not on file     Years of education: Not on file     Highest education level: Not on file   Occupational History     Not on file   Social Needs     Financial resource strain: Not on file     Food insecurity     Worry: Not on file     Inability: Not on file     Transportation needs     Medical: Not on file     Non-medical: Not on file   Tobacco Use     Smoking status: Former Smoker     Quit date: 10/1/1981     Years since quittin.7     Smokeless tobacco: Never Used   Substance and Sexual Activity     Alcohol use: Yes     Comment: social     Drug use: No     Sexual activity: Not Currently     Partners: Male   Lifestyle     Physical activity     Days per week: Not on file     Minutes per session: Not on file     Stress: Not on file   Relationships     Social connections     Talks on phone: Not on file     Gets together: Not on file     Attends Hoahaoism service: Not on file     Active member of club or organization: Not on file     Attends meetings of clubs or organizations: Not on file     Relationship status: Not on file     Intimate partner violence     Fear of current or ex partner: Not on file     Emotionally abused: Not on file     Physically  "abused: Not on file     Forced sexual activity: Not on file   Other Topics Concern     Parent/sibling w/ CABG, MI or angioplasty before 65F 55M? Not Asked   Social History Narrative     Not on file       Current Outpatient Medications   Medication Sig Dispense Refill     Alpha-Lipoic Acid 300 MG TABS Take by mouth 3 times daily.        butalbital-acetaminophen-caffeine (ESGIC) -40 MG tablet Take 1 tablet by mouth daily as needed for headaches 20 tablet 0     cetirizine (ZYRTEC) 10 MG tablet Take 10 mg by mouth daily       Collagen-Boron-Hyaluronic Acid ([employer] JOINT HEALTH TRIPLE ACTION PO) Take 1 capsule by mouth daily       cyanocobalamin (VITAMIN B-12) 1000 MCG tablet Take 1 tablet (1,000 mcg) by mouth daily 90 tablet 0     esomeprazole (NEXIUM) 20 MG DR capsule Take 20 mg by mouth every morning (before breakfast) Take 30-60 minutes before eating.       hydrochlorothiazide (HYDRODIURIL) 50 MG tablet TAKE 1 TABLET EVERY DAY 90 tablet 1     ipratropium (ATROVENT) 0.03 % nasal spray USE 2 SPRAYS IN EACH NOSTRIL EVERY 12 HOURS AS NEEDED FOR RHINITIS 30 mL 0     Levocarnitine 500 MG CAPS Take  by mouth. TID       Multiple Vitamins-Minerals (QC WOMENS DAILY MULTIVITAMIN PO) Take by mouth 2 times daily       topiramate (TOPAMAX) 25 MG tablet TAKE 1 TABLET IN THE MORNING  AND 3 TABLETS IN THE EVENING 360 tablet 1     traMADol (ULTRAM) 50 MG tablet TAKE 1 TABLET BY MOUTH ONCE DAILY IN THE MORNING AND 1 IN THE EVENING ON  TUESDAY,  THURSDAY  AND  SATURDAY 43 tablet 0     traZODone (DESYREL) 50 MG tablet TAKE 1 TABLET (50 MG) BY MOUTH AT BEDTIME 90 tablet 3     TUMS 500 OR 2 tabs q pm       VITAMIN D 1000 UNIT OR TABS 1 daily.          Allergies   Allergen Reactions     Cortisone Anaphylaxis     \"injected\"     Prednisolone Itching, Swelling and Rash     pretezone   Cannot use topical  prednisone       REVIEW OF SYSTEMS:  CONSTITUTIONAL:  NEGATIVE for fever, chills, change in weight  INTEGUMENTARY/SKIN:  NEGATIVE for " worrisome rashes, moles or lesions  EYES:  NEGATIVE for vision changes or irritation  ENT/MOUTH:  NEGATIVE for ear, mouth and throat problems  RESP:  NEGATIVE for significant cough or SOB  BREAST:  NEGATIVE for masses, tenderness or discharge  CV:  NEGATIVE for chest pain, palpitations or peripheral edema  GI:  NEGATIVE for nausea, abdominal pain, heartburn, or change in bowel habits  :  Negative   MUSCULOSKELETAL:  See HPI above  NEURO:  NEGATIVE for weakness, dizziness or paresthesias  ENDOCRINE:  NEGATIVE for temperature intolerance, skin/hair changes  HEME/ALLERGY/IMMUNE:  NEGATIVE for bleeding problems  PSYCHIATRIC:  NEGATIVE for changes in mood or affect      PHYSICAL EXAM:  There were no vitals taken for this visit.  There is no height or weight on file to calculate BMI.   GENERAL APPEARANCE: healthy, alert and no distress   HEENT: No apparent thyroid megaly. Clear sclera with normal ocular movement  RESPIRATORY: No labored breathing  SKIN: no suspicious lesions or rashes  NEURO: Normal strength and tone, mentation intact and speech normal  VASCULAR: Good pulses, and capillary refill   LYMPH: no lymphadenopathy   PSYCH:  mentation appears normal and affect normal/bright    MUSCULOSKELETAL:  Very young looking 80-year-old female not in acute distress  Normal gait    Symmetrical appearance of both hands with slight prominence at the thumb CMC joint  Even though the crepitus itself is not significant, she has a lot of pain with circumduction maneuver of the thumb at the CMC joints, bilateral    No focal tenderness at the A1 pulleys throughout both hands    No swelling or erythema in the dorsal aspect  No swelling of the MCP joints  Finkelstein test is negative    With a palpation, she does have distinct tenderness along the extensor tendon over the fourth ray  No subluxation of the extensor tendon is noted.  Grossly pinching and  strength is intact    Wrist itself is not swollen  No significant deformities  in the IP joints     ASSESSMENT:  Bilateral thumb CMC joint DJD, right more symptomatic  Extensor tendinitis of the ring finger, right    PLAN:  The x-ray images of the right wrist of Usha 10, 2021 were visualized.  Presence of rather advanced thumb CMC joint DJD changes were pointed out.  We talked about the treatment options of medication which she can start using over-the-counter anti-inflammatory medication versus cortisone injection and the surgical intervention as a last resort.    She thinks that she has allergic reaction to cortisone injection based on her experience from 50 years ago.  At this point, my recommendation is for her to go through allergy test for that.  We felt that very unlikely she has cortisone injection prophylactic reaction but more reaction to the impurity that was used 50 years ago.    In the meantime, she can also apply Voltaren gel that she uses for the knee on the right.  Icing 10 minutes 3-4 times is also recommended.    If she is interested in considering cortisone injection after allergy testing, she can come in for cortisone injections to the thumb CMC joints in the future.    As far as the tendinitis issue is concerned this was felt to be coming from her use of the hand related to the computer mouse.  Limiting her usage was recommended.    Follow-up as needed.      Imaging Interpretation:   None today      Jaoxn Saeed MD  Department of Orthopedic Surgery        Disclaimer: This note consists of symbols derived from keyboarding, dictation and/or voice recognition software. As a result, there may be errors in the script that have gone undetected. Please consider this when interpreting information found in this chart.        Again, thank you for allowing me to participate in the care of your patient.        Sincerely,        Jaxon Saeed MD

## 2021-07-13 ENCOUNTER — TELEPHONE (OUTPATIENT)
Dept: INTERNAL MEDICINE | Facility: CLINIC | Age: 81
End: 2021-07-13

## 2021-07-13 DIAGNOSIS — Z88.9 ALLERGY HISTORY, DRUG: Primary | ICD-10-CM

## 2021-07-13 NOTE — TELEPHONE ENCOUNTER
"Patient had allergy testing done \"years ago but never tested for steroids\".  States she has had reaction to steroids in past but never had any allergy testing done for steroids.  DEANNE Mejias R.N.    "

## 2021-07-13 NOTE — TELEPHONE ENCOUNTER
Patient calling requesting a referral for a allergist. Please advise. Ok to call and lm [phone]  Seeing Dr Tucker

## 2021-07-15 NOTE — TELEPHONE ENCOUNTER
Saw ortho for osteoarthritis of hands & knees. Ortho wanted patient to have steroid injection. Patient stated she had anaphylactic reaction to cortisone 50 years ago. Ortho asked that patient she allergist to confirm.

## 2021-08-02 ENCOUNTER — NURSE TRIAGE (OUTPATIENT)
Dept: INTERNAL MEDICINE | Facility: CLINIC | Age: 81
End: 2021-08-02

## 2021-08-02 ENCOUNTER — OFFICE VISIT (OUTPATIENT)
Dept: INTERNAL MEDICINE | Facility: CLINIC | Age: 81
End: 2021-08-02
Payer: MEDICARE

## 2021-08-02 ENCOUNTER — ANCILLARY PROCEDURE (OUTPATIENT)
Dept: GENERAL RADIOLOGY | Facility: CLINIC | Age: 81
End: 2021-08-02
Attending: PHYSICIAN ASSISTANT
Payer: MEDICARE

## 2021-08-02 VITALS
RESPIRATION RATE: 18 BRPM | WEIGHT: 158.3 LBS | OXYGEN SATURATION: 97 % | DIASTOLIC BLOOD PRESSURE: 77 MMHG | HEIGHT: 64 IN | BODY MASS INDEX: 27.02 KG/M2 | HEART RATE: 75 BPM | TEMPERATURE: 97.9 F | SYSTOLIC BLOOD PRESSURE: 129 MMHG

## 2021-08-02 DIAGNOSIS — R07.81 RIB PAIN ON LEFT SIDE: Primary | ICD-10-CM

## 2021-08-02 DIAGNOSIS — Z88.9 HISTORY OF ALLERGY: ICD-10-CM

## 2021-08-02 DIAGNOSIS — R07.81 RIB PAIN ON LEFT SIDE: ICD-10-CM

## 2021-08-02 PROCEDURE — 99213 OFFICE O/P EST LOW 20 MIN: CPT | Performed by: PHYSICIAN ASSISTANT

## 2021-08-02 PROCEDURE — 71101 X-RAY EXAM UNILAT RIBS/CHEST: CPT | Mod: LT | Performed by: RADIOLOGY

## 2021-08-02 RX ORDER — ZINC OXIDE 13 %
CREAM (GRAM) TOPICAL
COMMUNITY

## 2021-08-02 ASSESSMENT — MIFFLIN-ST. JEOR: SCORE: 1165.1

## 2021-08-02 NOTE — PROGRESS NOTES
Assessment & Plan     Rib pain on left side  No evidence of fracture. Injury consistent with contusion. Discussed rest, ice, OTC pain relievers. Important to continue taking good breaths. Advise follow-up if no improvement over the next 3 weeks.  - XR Ribs & Chest Left G/E 3 Views; Future    History of allergy  Wants to see allergist regarding history of allergy to cortisone. May need an injection in her hand d/t arthritis. Cortisone allergy diagnosed decades ago. Referral entered, but patient never received a call to schedule. Referral re-entered and patient was given phone number to call if she doesn't hear from them.  - Adult Allergy/Asthma Referral; Future    35 minutes spent on the date of the encounter doing chart review, history and exam, documentation and further activities per the note      No follow-ups on file.    Roxie Luna PA-C  [facility] YAIR Sanchez is a 80 year old who presents for the following health issues     HPI     Musculoskeletal problem/pain  Onset/Duration: 07/23/21  Description  Location: left chest and upper back - left  Joint Swelling: no  Redness: Left eye  Pain: YES  Warmth: no  Intensity:  severe  Progression of Symptoms:  worsening  Accompanying signs and symptoms:   Fevers: no  Numbness/tingling/weakness: no  History  Trauma to the area: YES  Recent illness:  no  Previous similar problem: no  Previous evaluation:  no  Precipitating or alleviating factors:  Aggravating factors include: cough  Therapies tried and outcome: heat    Was carrying groceries into her house on 7/23/21 when she tripped on her long skirt. Fell onto her left side (hit her head, left shoulder, left ribs/breast, left hip, and left knee.  Abrasion on knee which is healing well.  Occasional hip pain, but has been doing all of her normal activities (including water aerobics) without difficulty. No h/o osteoporosis.    Most concerned about the rib/chest/breast pain. No  "pain at rest, but gets some pain with movement, and big increase in pain with cough/sneeze.    No history of fractures    Has fibromyalgia    Takes ibuprofen for the pain at times, which seems to help    Review of Systems   Constitutional, HEENT, cardiovascular, pulmonary, gi and gu systems are negative, except as otherwise noted.      Objective    /77 (BP Location: Right arm, Patient Position: Chair, Cuff Size: Adult Regular)   Pulse 75   Temp 97.9  F (36.6  C) (Oral)   Resp 18   Ht 1.613 m (5' 3.5\")   Wt 71.8 kg (158 lb 4.8 oz)   LMP  (LMP Unknown)   SpO2 97%   Breastfeeding No   BMI 27.60 kg/m    Body mass index is 27.6 kg/m .  Physical Exam   GENERAL: healthy, alert and no distress  RESP: lungs clear to auscultation - no rales, rhonchi or wheezes  CV: regular rates and rhythm, normal S1 S2, no S3 or S4 and no murmur, click or rub  MS: Normal ROM of shoulder without pain. Tenderness to palpation of left anterior ribs and left breast. No pain with application of pressure to sternum and thoracic spine. Normal gait  SKIN: Crusted lesion on left knee. No signs of infection  NEURO: speech normal and gait normal  PSYCH: mentation appears normal, affect normal/bright    Xray - Reviewed and interpreted by me. Negative for fracture. No evidence of pneumothorax or consolidation in lungs.        "

## 2021-08-02 NOTE — TELEPHONE ENCOUNTER
Fall 7/23/21 when coming into house from garage carrying groceries.  Had a long flowing skirt on and while trying to kick the skirt in front of her she stepped on it slipping falling hard on left side injuring knee, hip, elbow, head very hard.  She denies loss of consciousness but states she felt stunned.  Her Apple Watch alerted and asked if she needed emergency vehicle, she said no and was able to reach her  out in the yard to come and help her up.  She felt pretty good after the fall with the exception of some abrasions. She now has a 2 day history of severe pain left chest and upper back when she sneezes or coughs.  Rates pain 10/10 with cough/sneeze.  Patient denies fever or difficulty breathing.  Scheduled patient to be seen in office today by JUANCHO Mejias R.N.

## 2021-08-03 ENCOUNTER — TELEPHONE (OUTPATIENT)
Dept: ALLERGY | Facility: CLINIC | Age: 81
End: 2021-08-03
Payer: MEDICARE

## 2021-08-03 NOTE — TELEPHONE ENCOUNTER
[facility] Call Center    Phone Message    May a detailed message be left on voicemail: yes     Reason for Call: Appointment Intake    Referring Provider Name: HALIE Luna  Diagnosis and/or Symptoms: Pt is referred for a history of cortizone injection allergy. Please review new referral and reach out to pt, thanks!    Action Taken: Message routed to:  [facility] ([facility] Allergy    Travel Screening: Not Applicable

## 2021-08-09 NOTE — TELEPHONE ENCOUNTER
[facility] Call [facility]    Phone Message    May a detailed message be left on voicemail: yes     Reason for Call: Appointment Intake    Referring Provider Name: Gloria Sifuentes MD in [place] [facility]  Diagnosis and/or Symptoms: Pt is referred for a history of cortizone injection allergy. Please review new referral and reach out to Pt. Protocols state to send new Pt's for clinic review. Thank you    Action Taken: Message routed to:  [facility] ([facility] Allergy    Travel Screening: Not Applicable

## 2021-08-11 ENCOUNTER — TELEPHONE (OUTPATIENT)
Dept: DERMATOLOGY | Facility: CLINIC | Age: 81
End: 2021-08-11

## 2021-08-13 NOTE — TELEPHONE ENCOUNTER
FUTURE VISIT INFORMATION      FUTURE VISIT INFORMATION:    Date: 10.6.21    Time:  9 AM    Location:  ALLERGY  REFERRAL INFORMATION:    Referring provider:  Jeremy    Referring providers clinic:  [facility]    Reason for visit/diagnosis  Allergy    RECORDS REQUESTED FROM:       Clinic name Comments Records Status Imaging Status   [facility]  8.2.21 Jeremy WHARTON

## 2021-10-06 ENCOUNTER — OFFICE VISIT (OUTPATIENT)
Dept: ALLERGY | Facility: CLINIC | Age: 81
End: 2021-10-06
Payer: MEDICARE

## 2021-10-06 ENCOUNTER — PRE VISIT (OUTPATIENT)
Dept: ALLERGY | Facility: CLINIC | Age: 81
End: 2021-10-06

## 2021-10-06 DIAGNOSIS — Z88.9 ALLERGY HISTORY, DRUG: ICD-10-CM

## 2021-10-06 DIAGNOSIS — T78.3XXD ANGIOEDEMA, SUBSEQUENT ENCOUNTER: ICD-10-CM

## 2021-10-06 DIAGNOSIS — Z91.018 FOOD ALLERGY: ICD-10-CM

## 2021-10-06 DIAGNOSIS — Z88.9 DRUG ALLERGY: Primary | ICD-10-CM

## 2021-10-06 DIAGNOSIS — J31.0 CHRONIC RHINITIS: ICD-10-CM

## 2021-10-06 PROCEDURE — 99203 OFFICE O/P NEW LOW 30 MIN: CPT | Performed by: DERMATOLOGY

## 2021-10-06 RX ORDER — DEXAMETHASONE SODIUM PHOSPHATE 4 MG/ML
4 INJECTION, SOLUTION INTRA-ARTICULAR; INTRALESIONAL; INTRAMUSCULAR; INTRAVENOUS; SOFT TISSUE ONCE
Status: DISCONTINUED | OUTPATIENT
Start: 2021-10-15 | End: 2022-08-24

## 2021-10-06 RX ORDER — METHYLPREDNISOLONE ACETATE 40 MG/ML
40 INJECTION, SUSPENSION INTRA-ARTICULAR; INTRALESIONAL; INTRAMUSCULAR; SOFT TISSUE ONCE
Qty: 1 ML | Refills: 0 | Status: SHIPPED | OUTPATIENT
Start: 2021-10-15 | End: 2021-10-15

## 2021-10-06 ASSESSMENT — PAIN SCALES - GENERAL: PAINLEVEL: NO PAIN (0)

## 2021-10-06 NOTE — PROGRESS NOTES
[facility] Dermato-allergology Note  Office visit  Encounter Date: Oct 6, 2021  ____________________________________________    CC: No chief complaint on file.      HPI:  (10/05/21)  Ms. Nubia Coronel is a(n) 80 year old female who presents today as a new patient for allergy consultation.      She presents today as her orthopedic surgeon, Dr. Jaxon Saeed, is inquiring about the possibility of utilizing steroid injection for treatment of hand arthritis, as the patient has injected cortisone and prednisone eye drops listed as allergies.    About 50 years ago, 6 months after a knee injury, the patient received a steroid injection into the knee by Dr. Camarillo. 1 hour later, her knee swelled up and she had trouble breathing. She was experiencing significant pain and felt panic at the time. She also notes having tingling of the lips, but does not recall developing a rash or eye, lip, or tongue swelling. Medications were administered and her trouble breathing resolved. Her physician told her she had an anaphylactic reaction. The knee swelling persisted about 2 days.     Her other allergic episode occurred about 20-25 years ago. She relates that she received synthetic steroid eye drops to address an eye infection and within about 2 minutes, the area around her eyes swelled significantly. She does not recall developing a rash or difficulty breathing. She received a medication and then the swelling resolved. Patient has avoided cortisone since this episode.    She also describes perennial rhinoconjunctivitis which developed in 1998, the same year she developed fibromyalgia. Severity does not change with indoor/outdoor setting or time of day. She does note that symptoms do improve during and after her water aerobics class.    - otherwise feeling well in usual state of health    Physical exam:  General: In no acute distress, well-developed, well-nourished  Eyes: no conjunctivitis  ENT: no signs  "of rhinitis   Pulmonary: no wheezing or coughing    Past Medical History:   Patient Active Problem List   Diagnosis     Headache     Chronic rhinitis     Myalgia and myositis     Renal calculus     CARDIOVASCULAR SCREENING; LDL GOAL LESS THAN 160     GERD (gastroesophageal reflux disease)     Bilateral lower extremity edema     Kidney stone     Fibromyalgia     Advance Care Planning     Primary insomnia     Prediabetes     Intractable migraine without aura and without status migrainosus     Degeneration of cervical intervertebral disc     Encounter for medication refill     Sleep disturbance     Past Medical History:   Diagnosis Date     Fibromyalgia      Myofacial muscle pain     having myofacial tx      Osteoarthrosis, unspecified whether generalized or localized, other specified sites     Cervical FRANCISCO - Dr. Dalia Huynh     Prediabetes 12/17/2019       Allergies:  Allergies   Allergen Reactions     Cortisone Anaphylaxis     \"injected\"     Prednisolone Itching, Swelling and Rash     pretezone   Cannot use topical  prednisone       Medications:  Current Outpatient Medications   Medication     Alpha-Lipoic Acid 300 MG TABS     butalbital-acetaminophen-caffeine (ESGIC) -40 MG tablet     cetirizine (ZYRTEC) 10 MG tablet     Collagen-Boron-Hyaluronic Acid ([facility] TRIPLE ACTION PO)     cyanocobalamin (VITAMIN B-12) 1000 MCG tablet     esomeprazole (NEXIUM) 20 MG DR capsule     hydrochlorothiazide (HYDRODIURIL) 50 MG tablet     ipratropium (ATROVENT) 0.03 % nasal spray     Levocarnitine 500 MG CAPS     Multiple Vitamins-Minerals (QC WOMENS DAILY MULTIVITAMIN PO)     Probiotic Product (PROBIOTIC DAILY) CAPS     topiramate (TOPAMAX) 25 MG tablet     traZODone (DESYREL) 50 MG tablet     TUMS 500 OR     VITAMIN D 1000 UNIT OR TABS     No current facility-administered medications for this visit.       Social History:  The patient is retired. Patient has the following hobbies or non-occupational exposure: " water aerobics.    Family History:  Family History   Problem Relation Age of Onset     NICHELLE.A.ANDRADE. Father           75 yo MI     Family History Negative Mother         born 191     Respiratory Brother          70 yo Emphysema (smoker)     Diabetes Brother      C.A.D. Brother         pacemaker     Blood Disease Sister         Factor 2 - blood clots,allergies     Family History Negative Son      Family History Negative Son      Family History Negative Son        Previous Labs, Allergy Tests, Dermatopathology, Imaging:  None    Referred By: Jazzy Santiago MD  [address] [place] [address]     Allergy Tests:    Past Allergy Test    Order for Future Allergy Testing:    [x] Outpatient  [] Inpatient: [facility]..../ Bed ....       Skin Atopy (atopic dermatitis) [] Yes   [x] No .........  Contact allergies:   [] Yes   [x] No ..........  Hand eczema:   [] Yes   [x] No           Leading hand:   [] R   [] L       [] Ambidextrous         Drug allergies:        [x] Yes   [] No  which?......    Urticaria/Angioedema  [] Yes   [x] No .........  Food Allergy:  [x] Yes   [] No  Which? Lip angioedema when at restaurants  Pets :  [] Yes   [x] No  which?......         [x]  Rhinitis   [] Conjunctivitis   [x] Sinusitis   [] Polyposis   [] Otitis   [] Pharyngitis         []  none  Operations:   [] Tonsils   [] Septum   [x] Sinus   [] Polyposis        [] Asthma bronchiale   [] Coughing      []  none  Symptoms (mostly Rhinoconjunctivitis and Asthma) aggravated by:  Season   [] I   [] II   [] III   [] IV   []V   []VI   []VII   []VIII   []IX   []X   []XI   []XI     [x] perennial   Day time      [] morning   [] noon      [] evening        [] night    [] whole day........  [x]  none  Location/changes    [] inside        [] outside   [] mountains    [] sea     [] others.............   [x]  none  Triggers, specific     [] animals     [] plants     [] dust              [] others ...........................    [x]   none  Triggers, others       [] work          [] psyche    [] sport            [] others .............................  [x]  none  Irritant                [] phys efforts [] smoke    [] heat/cold     [] odors  []others............... [x]  none      Order for PRICK TESTS    Reason for tests (suspected allergy): perennial rhinoconjunctivitis, cortisone/prednisone  Known previous allergies: None confirmed by testing    Standardized prick panels  [x] Atopic panel (20 tests)  [] Pediatric Panel (12 tests)  [] Milk, Meat, Eggs, Grains (20 tests)   [] Dust, Epithelia, Feathers (10 tests)  [] Fish, Seafood, Shellfish (17 tests)  [] Nuts, Beans (14 tests)  [] Spice, Vegetable, Fruit (17 tests)  [] Pollen Panel = Tree, Grass, Weed (24 tests)  [] Others: ...      [] Patient's own products: ...    DO NOT test if chemical or biological identity is unknown!     always ask from patient the product information and safety sheets (MSDS)     Standardized intradermal tests  [] Penicillium notatum [] Aspergillus fumigatus [] House dust mites D.far & D. pteron  [] Cat    [] dog  [] Others: ...  [] Bee venom   [] Wasp venom  !!Specific protocol with dilutions!!       Order for Drug allergy tests (prick & Intradermal & *patch tests)    [] Penicillin G  [] Ampicillin [] Cefazolin   [] Ceftriaxone   [] Ceftazidime  [] Bactrim    [x] Others: Hydrocortisone, Triamcinolone, Betamethasone, Dexamethasone, Carboxymethylcellulose  Order for ... as test date    CORTICOSTEROIDS   # Substance 2 days 4 days remarks Allergy  class    1 Amcinonide - -  B    2 Betametasone-17,21 Dipropionate - -  D1    3 Desoximetasone - -  C    4 Betamethasone-17-Valerate - -  D1    5 Dexamethasone - -  C    6 Hydrocortisone - -  A    7 Clobetasol-17-Propionate - -  D1    8 Dexamethasone-21-Phosphate Disodium Salt - -  C    9 Hydrocortisone-17 Butyrate - -  D2    10 Prednisolone - -  A    11 Triamcinolone Acetonide - -  B    12 Methylprednisolone Aceponate - -  D2    13  Hydrocortisone-21-Acetate - -  A    14 Prednicarbate - -  D2     Group Characteristics of group Generic name Name  cross reactions   A Hydrocortisone   Cloprednole, Fludrocortisone acétate, Hydrocortison acetate, Methylprednisolone, Prednisolone, Tixocortolpivalate Alfacortone, Fucidin H, Dermacalm, Hexacortone, Premandole, Imacort With group D2   B Triamcinolone-acetonide   Budenoside (R-isomer), Amcinonide, Desonide, Fluocinolone acetonide, Triamcinolone acetonide Locapred, Locatop  Synalar, Pevisone, Kenacort -   C Betamethasone (Without Jenelle)   Betamethasone, Dexamethasone, Flumethasone pivalate, Halomethasone Daivobet, Dexasalyl, Locasalen,   -   D1 Betamethasone-diproprionate   Betamethasone dipropionate, Betamethasone-17-valerate, Clobetasole-propionate, Fluticasone propionate, Mometasone furoate Betnovate, Diprogenta, Diprosalic, Diprosone, Celestoderm, Fucicort,  Cutivate, Axotide, Elocom -   D2 Methylprednisolone-aceponate   Hydrocortisone-aceponate, Hydrocortisone-buteprate, Hydrocortisone-17-butyrate, Methylprednisolone aceponate, Prednicarbate Locoïd, Advantan,  Prednitop With group A and Budesonide (S-isomer)         ________________________________    Assessment & Plan:    ==> Final Diagnosis:      # Immediate-type localized swelling after topical and injected steroids - prednisone/hydrocortisone?  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    ==> Treatment Plan:  See later      Staff and Medical Student:  Provider    Staff Physician Comments:  I was present with the medical student who participated in the  service and in the documentation of the note. I have verified the history and personally performed the physical exam and medical decision making. I agree with the assessment and plan as documented in the note. I have reviewed and if necessary amended the note.      Lamberto Pineda MD  Professor  Head of Dermato-Allergy Division  Department of Dermatology  [facility]    Follow-up in Derm-Allergy clinic for drug allergy testing as planned      I spent a total of 30minutes with Nubia Coronel. This time was spent counseling the patient and/or coordinating care, explaining the allergy tests

## 2021-10-06 NOTE — LETTER
10/6/2021         RE: Nubia Coronel  [address] [address] [facility] [address]        Dear Colleague,    Thank you for referring your patient, Nubia Coronel, to the [facility] [place]. Please see a copy of my visit note below.    [facility] Dermato-allergology Note  Office visit  Encounter Date: Oct 6, 2021  ____________________________________________    CC: No chief complaint on file.      HPI:  (10/05/21)  Ms. Nubia Coronel is a(n) 80 year old female who presents today as a new patient for allergy consultation.      She presents today as her orthopedic surgeon, Dr. Jaxon Saeed, is inquiring about the possibility of utilizing steroid injection for treatment of hand arthritis, as the patient has injected cortisone and prednisone eye drops listed as allergies.    About 50 years ago, 6 months after a knee injury, the patient received a steroid injection into the knee by Dr. Camarillo. 1 hour later, her knee swelled up and she had trouble breathing. She was experiencing significant pain and felt panic at the time. She also notes having tingling of the lips, but does not recall developing a rash or eye, lip, or tongue swelling. Medications were administered and her trouble breathing resolved. Her physician told her she had an anaphylactic reaction. The knee swelling persisted about 2 days.     Her other allergic episode occurred about 20-25 years ago. She relates that she received synthetic steroid eye drops to address an eye infection and within about 2 minutes, the area around her eyes swelled significantly. She does not recall developing a rash or difficulty breathing. She received a medication and then the swelling resolved. Patient has avoided cortisone since this episode.    She also describes perennial rhinoconjunctivitis which developed in 1998, the same year she developed fibromyalgia. Severity does not change with indoor/outdoor  "setting or time of day. She does note that symptoms do improve during and after her water aerobics class.    - otherwise feeling well in usual state of health    Physical exam:  General: In no acute distress, well-developed, well-nourished  Eyes: no conjunctivitis  ENT: no signs of rhinitis   Pulmonary: no wheezing or coughing    Past Medical History:   Patient Active Problem List   Diagnosis     Headache     Chronic rhinitis     Myalgia and myositis     Renal calculus     CARDIOVASCULAR SCREENING; LDL GOAL LESS THAN 160     GERD (gastroesophageal reflux disease)     Bilateral lower extremity edema     Kidney stone     Fibromyalgia     Advance Care Planning     Primary insomnia     Prediabetes     Intractable migraine without aura and without status migrainosus     Degeneration of cervical intervertebral disc     Encounter for medication refill     Sleep disturbance     Past Medical History:   Diagnosis Date     Fibromyalgia      Myofacial muscle pain     having myofacial tx      Osteoarthrosis, unspecified whether generalized or localized, other specified sites     Cervical DJD - Dr. Dalia Huynh     Prediabetes 12/17/2019       Allergies:  Allergies   Allergen Reactions     Cortisone Anaphylaxis     \"injected\"     Prednisolone Itching, Swelling and Rash     pretezone   Cannot use topical  prednisone       Medications:  Current Outpatient Medications   Medication     Alpha-Lipoic Acid 300 MG TABS     butalbital-acetaminophen-caffeine (ESGIC) -40 MG tablet     cetirizine (ZYRTEC) 10 MG tablet     Collagen-Boron-Hyaluronic Acid ([facility] JOINT [facility] TRIPLE ACTION PO)     cyanocobalamin (VITAMIN B-12) 1000 MCG tablet     esomeprazole (NEXIUM) 20 MG DR capsule     hydrochlorothiazide (HYDRODIURIL) 50 MG tablet     ipratropium (ATROVENT) 0.03 % nasal spray     Levocarnitine 500 MG CAPS     Multiple Vitamins-Minerals (QC WOMENS DAILY MULTIVITAMIN PO)     Probiotic Product (PROBIOTIC DAILY) CAPS     topiramate " (TOPAMAX) 25 MG tablet     traZODone (DESYREL) 50 MG tablet     TUMS 500 OR     VITAMIN D 1000 UNIT OR TABS     No current facility-administered medications for this visit.       Social History:  The patient is retired. Patient has the following hobbies or non-occupational exposure: water aerobics.    Family History:  Family History   Problem Relation Age of Onset     C.A.D. Father           73 yo MI     Family History Negative Mother         born 191     Respiratory Brother          70 yo Emphysema (smoker)     Diabetes Brother      C.A.D. Brother         pacemaker     Blood Disease Sister         Factor 2 - blood clots,allergies     Family History Negative Son      Family History Negative Son      Family History Negative Son        Previous Labs, Allergy Tests, Dermatopathology, Imaging:  None    Referred By: Jazzy Santiago MD  [address] [place] [address]     Allergy Tests:    Past Allergy Test    Order for Future Allergy Testing:    [x] Outpatient  [] Inpatient: [facility]..../ Bed ....       Skin Atopy (atopic dermatitis) [] Yes   [x] No .........  Contact allergies:   [] Yes   [x] No ..........  Hand eczema:   [] Yes   [x] No           Leading hand:   [] R   [] L       [] Ambidextrous         Drug allergies:        [x] Yes   [] No  which?......    Urticaria/Angioedema  [] Yes   [x] No .........  Food Allergy:  [x] Yes   [] No  Which? Lip angioedema when at restaurants  Pets :  [] Yes   [x] No  which?......         [x]  Rhinitis   [] Conjunctivitis   [x] Sinusitis   [] Polyposis   [] Otitis   [] Pharyngitis         []  none  Operations:   [] Tonsils   [] Septum   [x] Sinus   [] Polyposis        [] Asthma bronchiale   [] Coughing      []  none  Symptoms (mostly Rhinoconjunctivitis and Asthma) aggravated by:  Season   [] I   [] II   [] III   [] IV   []V   []VI   []VII   []VIII   []IX   []X   []XI   []XI     [x] perennial   Day time      [] morning   [] noon      [] evening        []  night    [] whole day........  [x]  none  Location/changes    [] inside        [] outside   [] mountains    [] sea     [] others.............   [x]  none  Triggers, specific     [] animals     [] plants     [] dust              [] others ...........................    [x]  none  Triggers, others       [] work          [] psyche    [] sport            [] others .............................  [x]  none  Irritant                [] phys efforts [] smoke    [] heat/cold     [] odors  []others............... [x]  none      Order for PRICK TESTS    Reason for tests (suspected allergy): perennial rhinoconjunctivitis, cortisone/prednisone  Known previous allergies: None confirmed by testing    Standardized prick panels  [x] Atopic panel (20 tests)  [] Pediatric Panel (12 tests)  [] Milk, Meat, Eggs, Grains (20 tests)   [] Dust, Epithelia, Feathers (10 tests)  [] Fish, Seafood, Shellfish (17 tests)  [] Nuts, Beans (14 tests)  [] Spice, Vegetable, Fruit (17 tests)  [] Pollen Panel = Tree, Grass, Weed (24 tests)  [] Others: ...      [] Patient's own products: ...    DO NOT test if chemical or biological identity is unknown!     always ask from patient the product information and safety sheets (MSDS)     Standardized intradermal tests  [] Penicillium notatum [] Aspergillus fumigatus [] House dust mites D.far & D. pteron  [] Cat    [] dog  [] Others: ...  [] Bee venom   [] Wasp venom  !!Specific protocol with dilutions!!       Order for Drug allergy tests (prick & Intradermal & *patch tests)    [] Penicillin G  [] Ampicillin [] Cefazolin   [] Ceftriaxone   [] Ceftazidime  [] Bactrim    [x] Others: Hydrocortisone, Triamcinolone, Betamethasone, Dexamethasone, Carboxymethylcellulose  Order for ... as test date    CORTICOSTEROIDS   # Substance 2 days 4 days remarks Allergy  class    1 Amcinonide - -  B    2 Betametasone-17,21 Dipropionate - -  D1    3 Desoximetasone - -  C    4 Betamethasone-17-Valerate - -  D1    5 Dexamethasone - -   C    6 Hydrocortisone - -  A    7 Clobetasol-17-Propionate - -  D1    8 Dexamethasone-21-Phosphate Disodium Salt - -  C    9 Hydrocortisone-17 Butyrate - -  D2    10 Prednisolone - -  A    11 Triamcinolone Acetonide - -  B    12 Methylprednisolone Aceponate - -  D2    13 Hydrocortisone-21-Acetate - -  A    14 Prednicarbate - -  D2     Group Characteristics of group Generic name Name  cross reactions   A Hydrocortisone   Cloprednole, Fludrocortisone acétate, Hydrocortison acetate, Methylprednisolone, Prednisolone, Tixocortolpivalate Alfacortone, Fucidin H, Dermacalm, Hexacortone, Premandole, Imacort With group D2   B Triamcinolone-acetonide   Budenoside (R-isomer), Amcinonide, Desonide, Fluocinolone acetonide, Triamcinolone acetonide Locapred, Locatop  Synalar, Pevisone, Kenacort -   C Betamethasone (Without Jenelle)   Betamethasone, Dexamethasone, Flumethasone pivalate, Halomethasone Daivobet, Dexasalyl, Locasalen,   -   D1 Betamethasone-diproprionate   Betamethasone dipropionate, Betamethasone-17-valerate, Clobetasole-propionate, Fluticasone propionate, Mometasone furoate Betnovate, Diprogenta, Diprosalic, Diprosone, Celestoderm, Fucicort,  Cutivate, Axotide, Elocom -   D2 Methylprednisolone-aceponate   Hydrocortisone-aceponate, Hydrocortisone-buteprate, Hydrocortisone-17-butyrate, Methylprednisolone aceponate, Prednicarbate Locoïd, Advantan,  Prednitop With group A and Budesonide (S-isomer)         ________________________________    Assessment & Plan:    ==> Final Diagnosis:      # Immediate-type localized swelling after topical and injected steroids - prednisone/hydrocortisone?  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    ==> Treatment Plan:  See later      Staff and Medical Student:  Provider    Staff Physician Comments:  I was present with the medical student who participated in the service and in the documentation of the note. I have verified the history and personally performed the physical exam and medical decision making. I agree with the assessment and plan as documented in the note. I have reviewed and if necessary amended the note.      Lamberto Pineda MD  Professor  Head of Dermato-Allergy Division  Department of Dermatology  [facility]    Follow-up in Derm-Allergy clinic for drug allergy testing as planned      I spent a total of 30minutes with Nubia Rebeca Homer. This time was spent counseling the patient and/or coordinating care, explaining the allergy tests       Again, thank you for allowing me to participate in the care of your patient.        Sincerely,        Lamberto Pineda MD

## 2021-10-06 NOTE — NURSING NOTE
Chief Complaint   Patient presents with     Allergy Consult     Laura is here today due to having an anaphylactic reaction to Cortisone 50 years ago and is now in need of a possible cortisone injection and her Doctor would like her to get tested to see if she would react if it was given to her again.     Filemon Vallecillo, Paramedic

## 2021-10-06 NOTE — PATIENT INSTRUCTIONS
As a reminder, please stop these medications before your allergy consult or allergy skin testing appointment.     Stop 7 days before your appointment:  - Alavert (loratadine)  - Allegra (fexofenadine)         - Atarax, Vistaril (hydroxyzine)  - Claritin (loratadine)  - Clarinex (desloratadine)  - Xyzal (levocetirizine)  - Zyrtec (cetirizine)  - Actifed, Dimetapp (brompheniramine)  - Benadryl (diphenhydramine)  - Chlortrimetron (chlorpheniramine)  - Periactin (cyproheptadine HC)  - Phenergan (promethazine)  - Tavist, Antihist (clemastine)  - Triaminic (chlorpheniramine)  - Antivert, Bonine, DizmissR (meclizine)  - Compazine (prochlorperazine)   - Marmine, Dramamine (dimenhydrinate)  - Contac  - Dimetane  - Dimetapp  - Dristan  - Drixoral  - Nyquil  - Robitussin Pediatric Night Relief  - Sine-Aid  - Sinutab  - Sinutin  - Tanafed DMX  - [facility] Nyquil  - Vicks Multi-Symptom/Pediatric Cold and Cough  - Nytol  - Darcy  - Sominex  - Tylenol PM  - Twilite  - Unisom    Stop 1 day before your appointment:  - Axid (nizatidine)  - Pepcid (famotidine)  - Tagamet (cimetidine)  - Zantac (ranitidine)  - Astelin, Astepro (azelastine)     If you have any questions, please send the clinic a [employer] message or call [phone].     Thank you!

## 2021-11-01 DIAGNOSIS — Z88.9 DRUG ALLERGY: Primary | ICD-10-CM

## 2021-11-01 RX ORDER — METHYLPREDNISOLONE SODIUM SUCCINATE 40 MG/ML
40 INJECTION, POWDER, LYOPHILIZED, FOR SOLUTION INTRAMUSCULAR; INTRAVENOUS ONCE
Status: ACTIVE | OUTPATIENT
Start: 2021-11-02

## 2021-11-01 RX ORDER — DEXAMETHASONE SODIUM PHOSPHATE 4 MG/ML
4 INJECTION, SOLUTION INTRA-ARTICULAR; INTRALESIONAL; INTRAMUSCULAR; INTRAVENOUS; SOFT TISSUE ONCE
Status: DISCONTINUED | OUTPATIENT
Start: 2021-11-02 | End: 2022-08-24

## 2021-11-01 RX ORDER — BETAMETHASONE SODIUM PHOSPHATE AND BETAMETHASONE ACETATE 3; 3 MG/ML; MG/ML
6 INJECTION, SUSPENSION INTRA-ARTICULAR; INTRALESIONAL; INTRAMUSCULAR; SOFT TISSUE ONCE
Status: ACTIVE | OUTPATIENT
Start: 2021-11-02

## 2021-11-02 ENCOUNTER — OFFICE VISIT (OUTPATIENT)
Dept: ALLERGY | Facility: CLINIC | Age: 81
End: 2021-11-02
Payer: MEDICARE

## 2021-11-02 ENCOUNTER — TELEPHONE (OUTPATIENT)
Dept: ALLERGY | Facility: CLINIC | Age: 81
End: 2021-11-02

## 2021-11-02 DIAGNOSIS — J31.0 CHRONIC RHINITIS: ICD-10-CM

## 2021-11-02 DIAGNOSIS — Z88.9 DRUG ALLERGY: Primary | ICD-10-CM

## 2021-11-02 DIAGNOSIS — Z91.09 HOUSE DUST MITE ALLERGY: ICD-10-CM

## 2021-11-02 PROCEDURE — 95004 PERQ TESTS W/ALRGNC XTRCS: CPT | Performed by: DERMATOLOGY

## 2021-11-02 PROCEDURE — 95024 IQ TESTS W/ALLERGENIC XTRCS: CPT | Performed by: DERMATOLOGY

## 2021-11-02 PROCEDURE — 99215 OFFICE O/P EST HI 40 MIN: CPT | Mod: 25 | Performed by: DERMATOLOGY

## 2021-11-02 PROCEDURE — 95018 ALL TSTG PERQ&IQ DRUGS/BIOL: CPT | Performed by: DERMATOLOGY

## 2021-11-02 RX ORDER — MOMETASONE FUROATE MONOHYDRATE 50 UG/1
2 SPRAY, METERED NASAL EVERY EVENING
Qty: 17 G | Refills: 3 | Status: SHIPPED | OUTPATIENT
Start: 2021-11-02 | End: 2022-12-20

## 2021-11-02 RX ORDER — FEXOFENADINE HCL 180 MG/1
180 TABLET ORAL EVERY EVENING
Qty: 50 TABLET | Refills: 2 | Status: SHIPPED | OUTPATIENT
Start: 2021-11-02

## 2021-11-02 ASSESSMENT — PAIN SCALES - GENERAL: PAINLEVEL: NO PAIN (0)

## 2021-11-02 NOTE — NURSING NOTE
Chief Complaint   Patient presents with     Allergy Testing Followup     Laura is here today for allergy testing for steroids and antibiotics     Siria Major

## 2021-11-02 NOTE — PROGRESS NOTES
[facility] Dermato-allergology Note  Office visit  Encounter Date: Nov 2, 2021  ____________________________________________    CC: No chief complaint on file.      HPI:  (11/01/21)  Ms. Nubia Coronel is a(n) 80 year old female who presents today as a return patient for allergy consultation  -   - otherwise feeling well in usual state of health    Physical exam:  General: In no acute distress, well-developed, well-nourished  Eyes: no conjunctivitis  ENT: no signs of rhinitis   Pulmonary: no wheezing or coughing  Skin:Focused examination of the skin on test sites was performed = see test results below    Earlier History and Allergy exams:   She presents today as her orthopedic surgeon, Dr. Jaxon Saeed, is inquiring about the possibility of utilizing steroid injection for treatment of hand arthritis, as the patient has injected cortisone and prednisone eye drops listed as allergies.    About 50 years ago, 6 months after a knee injury, the patient received a steroid injection into the knee by Dr. Camarillo. 1 hour later, her knee swelled up and she had trouble breathing. She was experiencing significant pain and felt panic at the time. She also notes having tingling of the lips, but does not recall developing a rash or eye, lip, or tongue swelling. Medications were administered and her trouble breathing resolved. Her physician told her she had an anaphylactic reaction. The knee swelling persisted about 2 days.     Her other allergic episode occurred about 20-25 years ago. She relates that she received synthetic steroid eye drops to address an eye infection and within about 2 minutes, the area around her eyes swelled significantly. She does not recall developing a rash or difficulty breathing. She received a medication and then the swelling resolved. Patient has avoided cortisone since this episode.    She also describes perennial rhinoconjunctivitis which developed in 1998, the same year  "she developed fibromyalgia. Severity does not change with indoor/outdoor setting or time of day. She does note that symptoms do improve during and after her water aerobics class.    Past Medical History:   Patient Active Problem List   Diagnosis     Headache     Chronic rhinitis     Myalgia and myositis     Renal calculus     CARDIOVASCULAR SCREENING; LDL GOAL LESS THAN 160     GERD (gastroesophageal reflux disease)     Bilateral lower extremity edema     Kidney stone     Fibromyalgia     Advance Care Planning     Primary insomnia     Prediabetes     Intractable migraine without aura and without status migrainosus     Degeneration of cervical intervertebral disc     Encounter for medication refill     Sleep disturbance     Past Medical History:   Diagnosis Date     Fibromyalgia      Myofacial muscle pain     having myofacial tx      Osteoarthrosis, unspecified whether generalized or localized, other specified sites     Cervical FRANCISCO - Dr. Dalia Huynh     Prediabetes 12/17/2019       Allergies:  Allergies   Allergen Reactions     Cortisone Anaphylaxis     \"injected\"     Prednisolone Itching, Swelling and Rash     pretezone   Cannot use topical  prednisone       Medications:  Current Outpatient Medications   Medication     Alpha-Lipoic Acid 300 MG TABS     butalbital-acetaminophen-caffeine (ESGIC) -40 MG tablet     cetirizine (ZYRTEC) 10 MG tablet     Collagen-Boron-Hyaluronic Acid ([facility] TRIPLE ACTION PO)     COMPOUNDED NON-CONTROLLED SUBSTANCE (CMPD RX) - PHARMACY TO MIX COMPOUNDED MEDICATION     cyanocobalamin (VITAMIN B-12) 1000 MCG tablet     esomeprazole (NEXIUM) 20 MG DR capsule     hydrochlorothiazide (HYDRODIURIL) 50 MG tablet     ipratropium (ATROVENT) 0.03 % nasal spray     Levocarnitine 500 MG CAPS     Multiple Vitamins-Minerals (QC WOMENS DAILY MULTIVITAMIN PO)     Probiotic Product (PROBIOTIC DAILY) CAPS     topiramate (TOPAMAX) 25 MG tablet     traZODone (DESYREL) 50 MG tablet     " TUMS 500 OR     UNABLE TO FIND     UNABLE TO FIND     VITAMIN D 1000 UNIT OR TABS     Current Facility-Administered Medications   Medication     dexamethasone (DECADRON) injection 4 mg     hydrocortisone sodium succinate PF (solu-CORTEF) injection 50 mg     triamcinolone acetonide (KENALOG-10) injection 10 mg       Social History:  The patient is retired. Patient has the following hobbies or non-occupational exposure: water aerobics.    Family History:  Family History   Problem Relation Age of Onset     C.A.D. Father           75 yo MI     Family History Negative Mother         born 191     Respiratory Brother          68 yo Emphysema (smoker)     Diabetes Brother      C.A.D. Brother         pacemaker     Blood Disease Sister         Factor 2 - blood clots,allergies     Family History Negative Son      Family History Negative Son      Family History Negative Son        Previous Labs, Allergy Tests, Dermatopathology, Imaging:  None    Referred By: Jazzy Santiago MD  [address] [place] [address]     Allergy Tests:    Past Allergy Test    Order for Future Allergy Testing:    [x] Outpatient  [] Inpatient: [facility]..../ Bed ....       Skin Atopy (atopic dermatitis) [] Yes   [x] No .........  Contact allergies:   [] Yes   [x] No ..........  Hand eczema:   [] Yes   [x] No           Leading hand:   [] R   [] L       [] Ambidextrous         Drug allergies:        [x] Yes   [] No  which?......    Urticaria/Angioedema  [] Yes   [x] No .........  Food Allergy:  [x] Yes   [] No  Which? Lip angioedema when at restaurants  Pets :  [] Yes   [x] No  which?......         [x]  Rhinitis   [] Conjunctivitis   [x] Sinusitis   [] Polyposis   [] Otitis   [] Pharyngitis         []  none  Operations:   [] Tonsils   [] Septum   [x] Sinus   [] Polyposis        [] Asthma bronchiale   [] Coughing      []  none  Symptoms (mostly Rhinoconjunctivitis and Asthma) aggravated by:  Season   [] I   [] II   [] III   [] IV    []V   []VI   []VII   []VIII   []IX   []X   []XI   []XI     [x] perennial   Day time      [] morning   [] noon      [] evening        [] night    [] whole day........  [x]  none  Location/changes    [] inside        [] outside   [] mountains    [] sea     [] others.............   [x]  none  Triggers, specific     [] animals     [] plants     [] dust              [] others ...........................    [x]  none  Triggers, others       [] work          [] psyche    [] sport            [] others .............................  [x]  none  Irritant                [] phys efforts [] smoke    [] heat/cold     [] odors  []others............... [x]  none      Order for PRICK TESTS    Reason for tests (suspected allergy): perennial rhinoconjunctivitis, cortisone/prednisone  Known previous allergies: None confirmed by testing    Standardized prick panels  [x] Atopic panel (20 tests)  [] Pediatric Panel (12 tests)  [] Milk, Meat, Eggs, Grains (20 tests)   [] Dust, Epithelia, Feathers (10 tests)  [] Fish, Seafood, Shellfish (17 tests)  [] Nuts, Beans (14 tests)  [] Spice, Vegetable, Fruit (17 tests)  [] Pollen Panel = Tree, Grass, Weed (24 tests)  [] Others: ...      [] Patient's own products: ...    DO NOT test if chemical or biological identity is unknown!     always ask from patient the product information and safety sheets (MSDS)     Standardized intradermal tests  [] Penicillium notatum [] Aspergillus fumigatus [] House dust mites D.far & D. pteron  [] Cat    [] dog  [] Others: ...  [] Bee venom   [] Wasp venom  !!Specific protocol with dilutions!!       Order for Drug allergy tests (prick & Intradermal & patch tests)    [] Penicillin G  [] Ampicillin [] Cefazolin   [] Ceftriaxone   [] Ceftazidime  [] Bactrim    [x] Others: Hydrocortisone, Triamcinolone, Betamethasone, Dexamethasone, Carboxymethylcellulose  Order for ... as test date      Atopy Screen (Placed 11/02/21)    No Substance Readings (15 min) Evaluation   POS  Histamine 1mg/ml ++    NEG NaCl 0.9% -      No Substance Readings (15 min) Evaluation   1 Alternaria alternata (tenuis)  -    2 Cladosporium herbarum -    3 Aspergillus fumigatus -    4 Penicillium notatum -    5 Dermatophagoides pteronyssinus -    6 Dermatophagoides farinae -    7 Dog epithelium (canis spp) -    8 Cat hair (renan catus) -    9 Cockroach   (Blatella americana & germanica) -    10 Grass mix midwest   (Usha, Orchard, Redtop, Jaswinder) -    11 Avel grass (sorghum halepense) -    12 Weed mix   (common Cocklebur, Lamb s quarters, rough redroot Pigweed, Dock/Sorrel) -    13 Mug wort (artemisia vulgare) -    14 Ragweed giant/short (ambrosia spp) -    15 White birch (Betula papyrifera) -    16 Tree mix 1 (Pecan, Maple BHR, Oak RVW, american [place], black [place]) -    17 Red cedar (juniperus virginia) -    18 Tree mix 2   (white Karl, river/red Birch, black [place], common [place], american Elm) -    19 Box elder/Maple mix (acer spp) -    20 Brandywine shagbark (carya ovata) -           Conclusion      Intradermal Testing (Placed 11/02/21)    No Substance Conc.  Reading (15min)  immediate Papule [mm] / Erythema [mm] Reading   (... days)  delayed Papule [mm] / Erythema [mm] Remarks   - NaCl  0.9% - neg  -    DF Standard Dust Mite - D. Farinae 1:10 -   -    DP Standard Dust Mite - D. Pteronyssinus 1:10 +/++ 8mmE/P  -    A Aspergillus fumigatus  1:10 +/++ 9mmE/P  -    P Penicillium notatum 1:10 - 5mmP/no E  -    Conclusions: patient has slight reaction to house dust mite D. Pteronyssinus and the mold Aspergillus        DRUG ALLERGY TEST SERIES 11/02/2021         Prick Tests         Substance/ Allergen Conc Result (15min) Remarks   1 Hydrocortison-21-succinat (A)  (Solu-CortefÒ) 50 mg/ml +    2 Methylprednisolon-21-succinat (A) (Solu-MedrolÒ) 40 mg/ml -    3 Triamcinolonacetonid (B) [1]  (Kenacort A10Ò) 10 mg/ml -    4 Dexamethason-phosphat (C)  (DecadronÒ) 4 mg/ml -    5 Betamethason-phosphat/-Na-acetat  (CelestoneÒ) 4 mg/ml -    6 Carboxymethylcellulose (E466) 5 mg/ml = 0.5% -       Intradermal Tests   immed immed delayed delayed      Substance Conc 1st dil 2nd dil  days days remarks    Hydrocortison-21-succinat (A) (Solu-CortefÒ) 1:100/ 10 neg neg       Methylprednisolon-21-succinat (A) (Solu-MedrolÒ) 1:10 neg        Triamcinolonacetonid (B) [2]  (Kenacort A10Ò) 1:10 neg        Dexamethason-phosphat (C)  (DecadronÒ) 1:10 neg        Betamethason-phosphat/-Na-acetat (CelestoneÒ) 1:10 3mmP/E ??        Carboxymethylcellulose (E466) 1:100/ 1:10 neg neg      Corticosteroid groups:  A  (Hydrocortisonalcohol) B  (Triamcinolone) C  (Betamethasone) D  (Hydrocortisonbutyrate)      Group Characteristics of group Generic name Name  cross reactions   A Hydrocortisone   Cloprednole, Fludrocortisone acétate, Hydrocortison acetate, Methylprednisolone, Prednisolone, Tixocortolpivalate Alfacortone, Fucidin H, Dermacalm, Hexacortone, Premandole, Imacort With group D2   B Triamcinolone-acetonide   Budenoside (R-isomer), Amcinonide, Desonide, Fluocinolone acetonide, Triamcinolone acetonide Locapred, Locatop  Synalar, Pevisone, Kenacort -   C Betamethasone (Without Jenelle)   Betamethasone, Dexamethasone, Flumethasone pivalate, Halomethasone Daivobet, Dexasalyl, Locasalen,   -   D1 Betamethasone-diproprionate   Betamethasone dipropionate, Betamethasone-17-valerate, Clobetasole-propionate, Fluticasone propionate, Mometasone furoate Betnovate, Diprogenta, Diprosalic, Diprosone, Celestoderm, Fucicort,  Cutivate, Axotide, Elocom -   D2 Methylprednisolone-aceponate   Hydrocortisone-aceponate, Hydrocortisone-buteprate, Hydrocortisone-17-butyrate, Methylprednisolone aceponate, Prednicarbate Locoïd, Advantan,  Prednitop With group A and Budesonide (S-isomer)         ________________________________    Assessment & Plan:    ==> Final Diagnosis:      # unlikely specific immediate-type localized swelling after topical and injected steroids -  prednisone/hydrocortisone?  >> Prick and intradermal tests with various steroids negatives, including Hydrocortisone succinate, methylprednisolone succinate, Triamcinolone, Dexamethasone.  >> If these steroids are necessary, they can be given. Premedicate patient with antihistamines and keep an eye on patient for 1h  >> premedication: maybe take 1h before the injection additionally 180mg Allegra. If no reaction call allergy clinic and then remove the allergy to Corticosteroids (changed the Epic allergy report)  * illness poses threat to life or bodily function    # Atopic predisposition; perennial rhinoconjunctivitis    In intradermal tests some reactions to D. 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    ==> Treatment Plan:  >> HDM reduction info given  >> try every evening 180mg Allegra and Nasonex nasal spray    Procedures Performed: Allergy tests, including prick, intradermal and drug allergy tests    Staff: : provider      Follow-up in Derm-Allergy clinic about 2 weeks after the surgery intervention  ___________________________    I spent a total of 40 minutes with Nubia Coronel during today s  visit. This time was spent discussing all the individual test results, correlating them to the clinical relevance, counseling the patient and/or coordinating care and performing allergy tests or procedures.

## 2021-11-02 NOTE — LETTER
11/2/2021         RE: Nubia Coronel  [address] [id number]        Dear Colleague,    Thank you for referring your patient, Nubia Coronel, to the [facility] [place]. Please see a copy of my visit note below.    [facility] Dermato-allergology Note  Office visit  Encounter Date: Nov 2, 2021  ____________________________________________    CC: No chief complaint on file.      HPI:  (11/01/21)  Ms. Nubia Coronel is a(n) 80 year old female who presents today as a return patient for allergy consultation  -   - otherwise feeling well in usual state of health    Physical exam:  General: In no acute distress, well-developed, well-nourished  Eyes: no conjunctivitis  ENT: no signs of rhinitis   Pulmonary: no wheezing or coughing  Skin:Focused examination of the skin on test sites was performed = see test results below  {Skin Exam:[id number]}    Earlier History and Allergy exams:   She presents today as her orthopedic surgeon, Dr. Jaxon Saeed, is inquiring about the possibility of utilizing steroid injection for treatment of hand arthritis, as the patient has injected cortisone and prednisone eye drops listed as allergies.    About 50 years ago, 6 months after a knee injury, the patient received a steroid injection into the knee by Dr. Camarillo. 1 hour later, her knee swelled up and she had trouble breathing. She was experiencing significant pain and felt panic at the time. She also notes having tingling of the lips, but does not recall developing a rash or eye, lip, or tongue swelling. Medications were administered and her trouble breathing resolved. Her physician told her she had an anaphylactic reaction. The knee swelling persisted about 2 days.     Her other allergic episode occurred about 20-25 years ago. She relates that she received synthetic steroid eye drops to address an eye infection and within about 2 minutes, the area  "around her eyes swelled significantly. She does not recall developing a rash or difficulty breathing. She received a medication and then the swelling resolved. Patient has avoided cortisone since this episode.    She also describes perennial rhinoconjunctivitis which developed in 1998, the same year she developed fibromyalgia. Severity does not change with indoor/outdoor setting or time of day. She does note that symptoms do improve during and after her water aerobics class.    Past Medical History:   Patient Active Problem List   Diagnosis     Headache     Chronic rhinitis     Myalgia and myositis     Renal calculus     CARDIOVASCULAR SCREENING; LDL GOAL LESS THAN 160     GERD (gastroesophageal reflux disease)     Bilateral lower extremity edema     Kidney stone     Fibromyalgia     Advance Care Planning     Primary insomnia     Prediabetes     Intractable migraine without aura and without status migrainosus     Degeneration of cervical intervertebral disc     Encounter for medication refill     Sleep disturbance     Past Medical History:   Diagnosis Date     Fibromyalgia      Myofacial muscle pain     having myofacial tx      Osteoarthrosis, unspecified whether generalized or localized, other specified sites     Cervical DJD - Dr. Dalia Huynh     Prediabetes 12/17/2019       Allergies:  Allergies   Allergen Reactions     Cortisone Anaphylaxis     \"injected\"     Prednisolone Itching, Swelling and Rash     pretezone   Cannot use topical  prednisone       Medications:  Current Outpatient Medications   Medication     Alpha-Lipoic Acid 300 MG TABS     butalbital-acetaminophen-caffeine (ESGIC) -40 MG tablet     cetirizine (ZYRTEC) 10 MG tablet     Collagen-Boron-Hyaluronic Acid ([facility] TRIPLE ACTION PO)     COMPOUNDED NON-CONTROLLED SUBSTANCE (CMPD RX) - PHARMACY TO MIX COMPOUNDED MEDICATION     cyanocobalamin (VITAMIN B-12) 1000 MCG tablet     esomeprazole (NEXIUM) 20 MG DR capsule     " hydrochlorothiazide (HYDRODIURIL) 50 MG tablet     ipratropium (ATROVENT) 0.03 % nasal spray     Levocarnitine 500 MG CAPS     Multiple Vitamins-Minerals ([facility] WOMENS DAILY MULTIVITAMIN PO)     Probiotic Product (PROBIOTIC DAILY) CAPS     topiramate (TOPAMAX) 25 MG tablet     traZODone (DESYREL) 50 MG tablet     TUMS 500 OR     UNABLE TO FIND     UNABLE TO FIND     VITAMIN D 1000 UNIT OR TABS     Current Facility-Administered Medications   Medication     dexamethasone (DECADRON) injection 4 mg     hydrocortisone sodium succinate PF (solu-CORTEF) injection 50 mg     triamcinolone acetonide (KENALOG-10) injection 10 mg       Social History:  The patient is retired. Patient has the following hobbies or non-occupational exposure: water aerobics.    Family History:  Family History   Problem Relation Age of Onset     C.A.D. Father           73 yo MI     Family History Negative Mother         born      Respiratory Brother          68 yo Emphysema (smoker)     Diabetes Brother      C.A.D. Brother         pacemaker     Blood Disease Sister         Factor 2 - blood clots,allergies     Family History Negative Son      Family History Negative Son      Family History Negative Son        Previous Labs, Allergy Tests, Dermatopathology, Imaging:  None    Referred By: Jazzy Santiago MD  [address] [place] [address]     Allergy Tests:    Past Allergy Test    Order for Future Allergy Testing:    [x] Outpatient  [] Inpatient: [facility]..../ Bed ....       Skin Atopy (atopic dermatitis) [] Yes   [x] No .........  Contact allergies:   [] Yes   [x] No ..........  Hand eczema:   [] Yes   [x] No           Leading hand:   [] R   [] L       [] Ambidextrous         Drug allergies:        [x] Yes   [] No  which?......    Urticaria/Angioedema  [] Yes   [x] No .........  Food Allergy:  [x] Yes   [] No  Which? Lip angioedema when at restaurants  Pets :  [] Yes   [x] No  which?......         [x]  Rhinitis   []  Conjunctivitis   [x] Sinusitis   [] Polyposis   [] Otitis   [] Pharyngitis         []  none  Operations:   [] Tonsils   [] Septum   [x] Sinus   [] Polyposis        [] Asthma bronchiale   [] Coughing      []  none  Symptoms (mostly Rhinoconjunctivitis and Asthma) aggravated by:  Season   [] I   [] II   [] III   [] IV   []V   []VI   []VII   []VIII   []IX   []X   []XI   []XI     [x] perennial   Day time      [] morning   [] noon      [] evening        [] night    [] whole day........  [x]  none  Location/changes    [] inside        [] outside   [] mountains    [] sea     [] others.............   [x]  none  Triggers, specific     [] animals     [] plants     [] dust              [] others ...........................    [x]  none  Triggers, others       [] work          [] psyche    [] sport            [] others .............................  [x]  none  Irritant                [] phys efforts [] smoke    [] heat/cold     [] odors  []others............... [x]  none      Order for PRICK TESTS    Reason for tests (suspected allergy): perennial rhinoconjunctivitis, cortisone/prednisone  Known previous allergies: None confirmed by testing    Standardized prick panels  [x] Atopic panel (20 tests)  [] Pediatric Panel (12 tests)  [] Milk, Meat, Eggs, Grains (20 tests)   [] Dust, Epithelia, Feathers (10 tests)  [] Fish, Seafood, Shellfish (17 tests)  [] Nuts, Beans (14 tests)  [] Spice, Vegetable, Fruit (17 tests)  [] Pollen Panel = Tree, Grass, Weed (24 tests)  [] Others: ...      [] Patient's own products: ...    DO NOT test if chemical or biological identity is unknown!     always ask from patient the product information and safety sheets (MSDS)     Standardized intradermal tests  [] Penicillium notatum [] Aspergillus fumigatus [] House dust mites D.far & D. pteron  [] Cat    [] dog  [] Others: ...  [] Bee venom   [] Wasp venom  !!Specific protocol with dilutions!!       Order for Drug allergy tests (prick & Intradermal &  patch tests)    [] Penicillin G  [] Ampicillin [] Cefazolin   [] Ceftriaxone   [] Ceftazidime  [] Bactrim    [x] Others: Hydrocortisone, Triamcinolone, Betamethasone, Dexamethasone, Carboxymethylcellulose  Order for ... as test date      Atopy Screen (Placed 11/02/21)    No Substance Readings (15 min) Evaluation   POS Histamine 1mg/ml ++    NEG NaCl 0.9% -      No Substance Readings (15 min) Evaluation   1 Alternaria alternata (tenuis)  -    2 Cladosporium herbarum -    3 Aspergillus fumigatus -    4 Penicillium notatum -    5 Dermatophagoides pteronyssinus -    6 Dermatophagoides farinae -    7 Dog epithelium (canis spp) -    8 Cat hair (renan catus) -    9 Cockroach   (Blatella americana & germanica) -    10 Grass mix midwest   (Usha, Orchard, Redtop, Jaswinder) -    11 Avel grass (sorghum halepense) -    12 Weed mix   (common Cocklebur, Lamb s quarters, rough redroot Pigweed, Dock/Sorrel) -    13 Mug wort (artemisia vulgare) -    14 Ragweed giant/short (ambrosia spp) -    15 White birch (Betula papyrifera) -    16 Tree mix 1 (Pecan, Maple BHR, Oak RVW, american [place], black [place]) -    17 Red cedar (juniperus virginia) -    18 Tree mix 2   (white Karl, river/red Birch, black [place], common [place], american Elm) -    19 Box elder/Maple mix (acer spp) -    20 [place] shagbark (carya ovata) -           Conclusion      Intradermal Testing (Placed 11/02/21)    No Substance Conc.  Reading (15min)  immediate Papule [mm] / Erythema [mm] Reading   (... days)  delayed Papule [mm] / Erythema [mm] Remarks   - NaCl  0.9% - neg  -    DF Standard Dust Mite - D. Farinae 1:10 -   -    DP Standard Dust Mite - D. Pteronyssinus 1:10 +/++ 8mmE/P  -    A Aspergillus fumigatus  1:10 +/++ 9mmE/P  -    P Penicillium notatum 1:10 - 5mmP/no E  -    Conclusions: patient has slight reaction to house dust mite D. Pteronyssinus and the mold Aspergillus        DRUG ALLERGY TEST SERIES 11/02/2021         Prick Tests         Substance/  Allergen Conc Result (15min) Remarks   1 Hydrocortison-21-succinat (A)  (Solu-CortefÒ) 50 mg/ml +    2 Methylprednisolon-21-succinat (A) (Solu-MedrolÒ) 40 mg/ml -    3 Triamcinolonacetonid (B) [1]  (Kenacort A10Ò) 10 mg/ml -    4 Dexamethason-phosphat (C)  (DecadronÒ) 4 mg/ml -    5 Betamethason-phosphat/-Na-acetat (CelestoneÒ) 4 mg/ml -    6 Carboxymethylcellulose (E466) 5 mg/ml = 0.5% -       Intradermal Tests   immed immed delayed delayed      Substance Conc 1st dil 2nd dil  days days remarks    Hydrocortison-21-succinat (A) (Solu-CortefÒ) 1:100/ 10 neg neg       Methylprednisolon-21-succinat (A) (Solu-MedrolÒ) 1:10 neg        Triamcinolonacetonid (B) [2]  (Kenacort A10Ò) 1:10 neg        Dexamethason-phosphat (C)  (DecadronÒ) 1:10 neg        Betamethason-phosphat/-Na-acetat (CelestoneÒ) 1:10 3mmP/E ??        Carboxymethylcellulose (E466) 1:100/ 1:10 neg neg      Corticosteroid groups:  A  (Hydrocortisonalcohol) B  (Triamcinolone) C  (Betamethasone) D  (Hydrocortisonbutyrate)      Group Characteristics of group Generic name Name  cross reactions   A Hydrocortisone   Cloprednole, Fludrocortisone acétate, Hydrocortison acetate, Methylprednisolone, Prednisolone, Tixocortolpivalate Alfacortone, Fucidin H, Dermacalm, Hexacortone, Premandole, Imacort With group D2   B Triamcinolone-acetonide   Budenoside (R-isomer), Amcinonide, Desonide, Fluocinolone acetonide, Triamcinolone acetonide Locapred, Locatop  Synalar, Pevisone, Kenacort -   C Betamethasone (Without Jenelle)   Betamethasone, Dexamethasone, Flumethasone pivalate, Halomethasone Daivobet, Dexasalyl, Locasalen,   -   D1 Betamethasone-diproprionate   Betamethasone dipropionate, Betamethasone-17-valerate, Clobetasole-propionate, Fluticasone propionate, Mometasone furoate Betnovate, Diprogenta, Diprosalic, Diprosone, Celestoderm, Fucicort,  Cutivate, Axotide, Elocom -   D2 Methylprednisolone-aceponate   Hydrocortisone-aceponate, Hydrocortisone-buteprate,  Hydrocortisone-17-butyrate, Methylprednisolone aceponate, Prednicarbate Locoïd, Advantan,  Prednitop With group A and Budesonide (S-isomer)         ________________________________    Assessment & Plan:    ==> Final Diagnosis:      # unlikely specific immediate-type localized swelling after topical and injected steroids - prednisone/hydrocortisone?  >> Prick and intradermal tests with various steroids negatives, including Hydrocortisone succinate, methylprednisolone succinate, Triamcinolone, Dexamethasone.  >> If these steroids are necessary, they can be given. Premedicate patient with antihistamines and keep an eye on patient for 1h  >> premedication: maybe take 1h before the injection additionally 180mg Allegra. If no reaction call allergy clinic and then remove the allergy to Corticosteroids (changed the Epic allergy report)  * illness poses threat to life or bodily function    # Atopic predisposition; perennial rhinoconjunctivitis    In intradermal tests some reactions to D. 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    ==> Treatment Plan:  >> HDM reduction info given  >> try every evening 180mg Allegra and Nasonex nasal spray    Procedures Performed: Allergy tests, including prick, intradermal and drug allergy tests    Staff: : provider      Follow-up in Derm-Allergy clinic about 2 weeks after the surgery intervention  ___________________________    I spent a total of 40 minutes with Nubia Coronel during today s  visit. This time was spent discussing all the individual test results, correlating them to the clinical relevance, counseling the patient and/or coordinating care and  performing allergy tests or procedures.           Again, thank you for allowing me to participate in the care of your patient.        Sincerely,        Lamberto Pineda MD

## 2021-11-02 NOTE — PATIENT INSTRUCTIONS
House Dust Mite Allergy        The house dust mite is an arachnid about 0.3 mm in size and not visible to the naked eye. There are around 150 species of house dust mites in the world. One mite produces up to 40 fecal droppings a day. One teaspoonful of bedroom dust contains an average of nearly 1000 mites and 250,000 minute droppings.    Causes and triggers of house dust mite allergy  The house dust mite requires a warm, moist environment without light in order to live and reproduce. Our beds are ideal. The mite feeds on human and animal skin scales. The allergen is mainly contained in the mite's feces. The feces contain allergy-triggering constituents which are spread in fine dust, are breathed in and can cause an allergic reaction.    Symptoms  When the allergens come into contact with the mucous membranes in the eyes, nose, mouth and throat, sufferers develop symptoms typical of an allergic cold (allergic rhinitis) or an allergic inflammation of the conjunctiva (allergic conjunctivitis): blocked or runny nose, sneezing, red, itchy eyes. If all of these symptoms are present, then the condition is also known as rhinoconjunctivitis. Often, the upper respiratory tract becomes chronically inflamed, primarily because house dust mites are present all year round.  The symptoms of house dust mite allergy typically occur in the morning and are more frequent in the cold months of the year.    Therapy and treatment  As a first step, mattress, pillows and duvet/comforter should be placed in mite-proof or anti-mite covers, sometimes known as encasings. Alternatively you can use pillows or comforter that can be washed at over 130 F monthly. At the same time, house dust should be minimized. If necessary, the symptoms can be treated with medication, for example antihistamines in the form of nasal sprays, eye drops and tablets. Desensitization/specific immunotherapy (SIT) is recommended for house dust allergy if all the measures  "above are not sufficient.    Tips and tricks:    Keep room temperature at 66-70 F and relative air humidity at a maximum of 50%.    Ideally, thoroughly air your home two to three times a day for 5 to 10 minutes each time.    Wash bed linens in at least 130 F every week.    Remove stuffed animals or freeze them every other week.    Keep ceiling fans off in the bedroom as they can stir up dust mite allergens.    Remove dust from furniture with a damp cloth and regularly wet mop floors.    Do not put pot and hydroponic plants in the bedroom and also avoid putting too many in living areas, as they increase room humidity.    When staying overnight in other accommodations, we recommend taking your own bed linen and the above anti-mite mattress covers with you.    Remove upholstered furniture from the bedroom and consider removing the carpets. Ideally, use sealed parquet or laminate monico, cork tiles or monico made of wood, novilon or PVC.    Maybe additionally reduce dust mites in mattress, upholstery, or monico using hot steam .      Modified from \"House Dust Mite Allergy\" by [employer].    Mold Allergy    Molds are thread-like micro-fungi - they occur all over the world and grow particularly in places where there is moisture. If living spaces are infested with mold, it must be removed quickly. It may pose a health risk.    Triggers of mold allergy   The most important known allergens among fungi are Aspergillus species, Alternaria alternata, Cladosporium species and Penicillium species, which all belong to the category of molds. Molds are found all over the world, but predominantly in nature, mainly in the soil, in dead organic matter. Indoors molds develop in places where it is damp, such as areas affected by leaks, in yen cracks or when the relative air humidity is high. Poorly maintained ventilation systems, air humidifiers, aquariums, or decorative fountains and a lot of plants in a " "room can also lead to a mold infestation.    Symptoms   Mold allergy, like other respiratory allergies, is manifest as allergic runny nose, streaming eyes, cough and shortness of breath and is frequently accompanied by asthma. As well as allergic reactions, molds also cause irritation of the airways, the mucous membranes of the eyes and the skin. The growth of mold is often associated with a distinctively musty smell of earth, damp and mushrooms, which additionally impairs well-being.    Therapy and treatment   Any fungal infestation in living areas must be removed rapidly and properly. It is always important to get rid of the cause, hence eradicate the damp problem. Otherwise the mold will quickly reappear. People with health problems should not remove even small patches of mold growth. Before the mold is cleared and in the weeks following its clearance, rooms should also be aired frequently and dust should be removed.    Tips and tricks:     Keep relative air humidity to a maximum of 45 per cent in winter    Keep temperature between 66 and 73 C in winter    Air rooms thoroughly for five to ten minutes twice to three times a day    Position furniture at least ten centimetres away from walls    No plants in the bedroom area    Dispose of compost or organic waste on a daily basis or store temporarily outside the home    Do not use air humidifiers or only use them if air humidity is continuously monitored        Modified from \"Mould Allergy\" by [employer] [place].      "

## 2021-11-02 NOTE — TELEPHONE ENCOUNTER
[facility] Call Center    Phone Message    May a detailed message be left on voicemail: yes     Reason for Call: Medication Question or concern regarding medication   Prescription Clarification  Name of Medication: mometasone (NASONEX) 50 MCG/ACT nasal spray  Prescribing Provider:    Pharmacy: [facility] [id number] [facility] [id number] [address]   What on the order needs clarification? Prior Auth was denied by insurance. Pharmacy would like to know if you would like to find a replacement? Please review. Thanks          Action Taken: Message routed to:  [facility] Allergy    Travel Screening: Not Applicable

## 2021-11-04 ENCOUNTER — OFFICE VISIT (OUTPATIENT)
Dept: ORTHOPEDICS | Facility: CLINIC | Age: 81
End: 2021-11-04
Payer: MEDICARE

## 2021-11-04 VITALS
HEIGHT: 64 IN | SYSTOLIC BLOOD PRESSURE: 128 MMHG | BODY MASS INDEX: 27.01 KG/M2 | DIASTOLIC BLOOD PRESSURE: 70 MMHG | WEIGHT: 158.2 LBS

## 2021-11-04 DIAGNOSIS — M18.11 PRIMARY OSTEOARTHRITIS OF FIRST CARPOMETACARPAL JOINT OF RIGHT HAND: Primary | ICD-10-CM

## 2021-11-04 PROCEDURE — 99213 OFFICE O/P EST LOW 20 MIN: CPT | Mod: 25 | Performed by: ORTHOPAEDIC SURGERY

## 2021-11-04 PROCEDURE — 20600 DRAIN/INJ JOINT/BURSA W/O US: CPT | Mod: RT | Performed by: ORTHOPAEDIC SURGERY

## 2021-11-04 RX ORDER — LIDOCAINE HYDROCHLORIDE 10 MG/ML
0.5 INJECTION, SOLUTION INFILTRATION; PERINEURAL
Status: DISCONTINUED | OUTPATIENT
Start: 2021-11-04 | End: 2022-08-24

## 2021-11-04 RX ORDER — TESTOSTERONE CYPIONATE 200 MG/ML
1 INJECTION INTRAMUSCULAR
Status: DISCONTINUED | OUTPATIENT
Start: 2021-11-04 | End: 2022-08-24

## 2021-11-04 RX ADMIN — LIDOCAINE HYDROCHLORIDE 0.5 ML: 10 INJECTION, SOLUTION INFILTRATION; PERINEURAL at 08:32

## 2021-11-04 RX ADMIN — TESTOSTERONE CYPIONATE 1 ML: 200 INJECTION INTRAMUSCULAR at 08:32

## 2021-11-04 ASSESSMENT — MIFFLIN-ST. JEOR: SCORE: 1164.65

## 2021-11-04 NOTE — LETTER
11/4/2021         RE: Nubia Coronel  [address] [facility] [address]        Dear Colleague,    Thank you for referring your patient, Nubia Coronel, to the [facility]. Please see a copy of my visit note below.    HISTORY OF PRESENT ILLNESS:    Nubia Coronel is a 80 year old female who is seen in follow up for right wrist and thumb pain. She was seen in evaluation for this pain on 6/18/21.  At this time patient was advised to pursue allergy testing after a anaphylactic reaction ~50 years ago.  Patient had consultation and testing performed with [facility], please see visit dated 11/2/21 with Dr. Pineda.   Present symptoms: right radial sided wrist pain.  She reports increasing pain in the wrist over the last 5 months.  She reports no new injury, or trauma to the region.  Pain with use of wrist and hand: twisting, and turning activities.  Treatments tried to this point: previous trials of Voltaren gel, Advil, activity avoidance  Past Medical History: Unchanged from the visit of 6/18/21. Please refer to that note.    REVIEW OF SYSTEMS:  CONSTITUTIONAL:  NEGATIVE for fever, chills, change in weight  INTEGUMENTARY/SKIN:  NEGATIVE for worrisome rashes, moles or lesions  EYES:  NEGATIVE for vision changes or irritation  ENT/MOUTH:  NEGATIVE for ear, mouth and throat problems  RESP:  NEGATIVE for significant cough or SOB  BREAST:  NEGATIVE for masses, tenderness or discharge  CV:  NEGATIVE for chest pain, palpitations or peripheral edema  GI:  NEGATIVE for nausea, abdominal pain, heartburn, or change in bowel habits  :  Negative   MUSCULOSKELETAL:  See HPI above  NEURO:  NEGATIVE for weakness, dizziness or paresthesias  ENDOCRINE:  NEGATIVE for temperature intolerance, skin/hair changes  HEME/ALLERGY/IMMUNE:  NEGATIVE for bleeding problems  PSYCHIATRIC:  NEGATIVE for changes in mood or affect    PHYSICAL EXAM:  /70 (BP Location:  "Right arm, Patient Position: Chair, Cuff Size: Adult Regular)   Ht 1.613 m (5' 3.5\")   Wt 71.8 kg (158 lb 3.2 oz)   LMP  (LMP Unknown)   BMI 27.58 kg/m    Body mass index is 27.58 kg/m .   GENERAL APPEARANCE: healthy, alert and no distress   SKIN: no suspicious lesions or rashes  NEURO: Normal strength and tone, mentation intact and speech normal  VASCULAR:  good pulses, and cappillary refill   LYMPH: no lymphadenopathy   PSYCH:  mentation appears normal and affect normal/bright    MSK:  Not in acute distress  Left lower forearm where the allergy test was done demonstrates no significant reaction to prednisone  Right thumb is painful with a grinding at the thumb CMC joint  No erythema or acute swelling noted  Circulation is intact  Sensation is intact    IMAGING INTERPRETATION: None taken today     ASSESSMENT:  Chronic right thumb DJD      PLAN:  She was informed of what cortisone shot is meant to do in terms of potential benefits and limitations.  We previously talked about potential risks.  He was also informed that benefit of the cord injection may take up to 2 weeks.  In case the course injection does not provide satisfactory relief of pain, we may try another cortisone injection although it then may need to be done in [place] since that [place] spends [place] winter in [place].  Continuation of exercises in a pool would be beneficial but is not going to eliminate all of her pain.      We again touched upon the possibility of visiting a surgical intervention for her thumb arthritis..    With our discussion, she tolerated the injection well.  Follow-up as needed.       Hand / Upper Extremity Injection/Arthrocentesis: R thumb CMC    Date/Time: 11/4/2021 8:32 AM  Performed by: Jaxon Saeed MD  Authorized by: Jaxon Saeed MD     Indications:  Pain  Needle Size:  27 G  Guidance: landmark    Approach:  Dorsal  Condition: osteoarthritis    Location:  Thumb  Site:  R thumb CMC    Medications:  1 mL dexamethasone " 120 MG/30ML; 0.5 mL lidocaine 1 %  Outcome:  Tolerated well, no immediate complications  Procedure discussed: discussed risks, benefits, and alternatives    Consent Given by:  Patient  Timeout: timeout called immediately prior to procedure    Prep: patient was prepped and draped in usual sterile fashion            Jaxon Saeed MD  Dept. Orthopedic Surgery  [facility]       Disclaimer: This note consists of symbols derived from keyboarding, dictation and/or voice recognition software. As a result, there may be errors in the script that have gone undetected. Please consider this when interpreting information found in this chart.        Again, thank you for allowing me to participate in the care of your patient.        Sincerely,        Jaxon Saeed MD

## 2021-11-04 NOTE — PATIENT INSTRUCTIONS
Be aware that the benefit of cord injection may take 2 weeks to kick in.  We may consider another cortisone injection in 6 weeks if no significant benefit is noted  Otherwise follow-up as needed

## 2021-11-04 NOTE — PROGRESS NOTES
"HISTORY OF PRESENT ILLNESS:    Nubia Coronel is a 80 year old female who is seen in follow up for right wrist and thumb pain. She was seen in evaluation for this pain on 6/18/21.  At this time patient was advised to pursue allergy testing after a anaphylactic reaction ~50 years ago.  Patient had consultation and testing performed with [facility], please see visit dated 11/2/21 with Dr. Pineda.   Present symptoms: right radial sided wrist pain.  She reports increasing pain in the wrist over the last 5 months.  She reports no new injury, or trauma to the region.  Pain with use of wrist and hand: twisting, and turning activities.  Treatments tried to this point: previous trials of Voltaren gel, Advil, activity avoidance  Past Medical History: Unchanged from the visit of 6/18/21. Please refer to that note.    REVIEW OF SYSTEMS:  CONSTITUTIONAL:  NEGATIVE for fever, chills, change in weight  INTEGUMENTARY/SKIN:  NEGATIVE for worrisome rashes, moles or lesions  EYES:  NEGATIVE for vision changes or irritation  ENT/MOUTH:  NEGATIVE for ear, mouth and throat problems  RESP:  NEGATIVE for significant cough or SOB  BREAST:  NEGATIVE for masses, tenderness or discharge  CV:  NEGATIVE for chest pain, palpitations or peripheral edema  GI:  NEGATIVE for nausea, abdominal pain, heartburn, or change in bowel habits  :  Negative   MUSCULOSKELETAL:  See HPI above  NEURO:  NEGATIVE for weakness, dizziness or paresthesias  ENDOCRINE:  NEGATIVE for temperature intolerance, skin/hair changes  HEME/ALLERGY/IMMUNE:  NEGATIVE for bleeding problems  PSYCHIATRIC:  NEGATIVE for changes in mood or affect    PHYSICAL EXAM:  /70 (BP Location: Right arm, Patient Position: Chair, Cuff Size: Adult Regular)   Ht 1.613 m (5' 3.5\")   Wt 71.8 kg (158 lb 3.2 oz)   LMP  (LMP Unknown)   BMI 27.58 kg/m    Body mass index is 27.58 kg/m .   GENERAL APPEARANCE: healthy, alert and no distress   SKIN: no suspicious lesions or " rashes  NEURO: Normal strength and tone, mentation intact and speech normal  VASCULAR:  good pulses, and cappillary refill   LYMPH: no lymphadenopathy   PSYCH:  mentation appears normal and affect normal/bright    MSK:  Not in acute distress  Left lower forearm where the allergy test was done demonstrates no significant reaction to prednisone  Right thumb is painful with a grinding at the thumb CMC joint  No erythema or acute swelling noted  Circulation is intact  Sensation is intact    IMAGING INTERPRETATION: None taken today     ASSESSMENT:  Chronic right thumb DJD      PLAN:  She was informed of what cortisone shot is meant to do in terms of potential benefits and limitations.  We previously talked about potential risks.  He was also informed that benefit of the cord injection may take up to 2 weeks.  In case the course injection does not provide satisfactory relief of pain, we may try another cortisone injection although it then may need to be done in [place] since that [place] spends her winter in [place].  Continuation of exercises in a pool would be beneficial but is not going to eliminate all of her pain.      We again touched upon the possibility of visiting a surgical intervention for her thumb arthritis..    With our discussion, she tolerated the injection well.  Follow-up as needed.       Hand / Upper Extremity Injection/Arthrocentesis: R thumb CMC    Date/Time: 11/4/2021 8:32 AM  Performed by: Jaxon Saeed MD  Authorized by: Jaxon Saeed MD     Indications:  Pain  Needle Size:  27 G  Guidance: landmark    Approach:  Dorsal  Condition: osteoarthritis    Location:  Thumb  Site:  R thumb CMC    Medications:  1 mL dexamethasone 120 MG/30ML; 0.5 mL lidocaine 1 %  Outcome:  Tolerated well, no immediate complications  Procedure discussed: discussed risks, benefits, and alternatives    Consent Given by:  Patient  Timeout: timeout called immediately prior to procedure    Prep: patient was prepped and  draped in usual sterile fashion            Jaxon Saeed MD  Dept. Orthopedic Surgery  [facility]       Disclaimer: This note consists of symbols derived from keyboarding, dictation and/or voice recognition software. As a result, there may be errors in the script that have gone undetected. Please consider this when interpreting information found in this chart.

## 2021-11-19 ENCOUNTER — OFFICE VISIT (OUTPATIENT)
Dept: INTERNAL MEDICINE | Facility: CLINIC | Age: 81
End: 2021-11-19
Payer: MEDICARE

## 2021-11-19 VITALS
OXYGEN SATURATION: 96 % | HEIGHT: 63 IN | HEART RATE: 86 BPM | BODY MASS INDEX: 28 KG/M2 | TEMPERATURE: 97.7 F | RESPIRATION RATE: 16 BRPM | SYSTOLIC BLOOD PRESSURE: 126 MMHG | WEIGHT: 158 LBS | DIASTOLIC BLOOD PRESSURE: 64 MMHG

## 2021-11-19 DIAGNOSIS — M79.7 FIBROMYALGIA: ICD-10-CM

## 2021-11-19 DIAGNOSIS — G89.4 PAIN SYNDROME, CHRONIC: Primary | ICD-10-CM

## 2021-11-19 PROCEDURE — 99215 OFFICE O/P EST HI 40 MIN: CPT | Performed by: INTERNAL MEDICINE

## 2021-11-19 RX ORDER — TRAMADOL HYDROCHLORIDE 50 MG/1
50 TABLET ORAL DAILY
Qty: 30 TABLET | Refills: 0 | Status: SHIPPED | OUTPATIENT
Start: 2021-11-19 | End: 2021-12-19

## 2021-11-19 ASSESSMENT — MIFFLIN-ST. JEOR: SCORE: 1161.93

## 2021-11-19 ASSESSMENT — PAIN SCALES - GENERAL: PAINLEVEL: MODERATE PAIN (4)

## 2021-11-19 NOTE — PATIENT INSTRUCTIONS
We will agree to resume tramadol at 50 mg (one tablet) each morning, with ability to take a second tablet three evenings per week.   I've sent the first month's prescription to your pharmacy.     Ideally we should set up appointments every three months.   Since you will be in [place] for a few months, please schedule an appointment to see me back upon your return to [place] in early May.

## 2021-11-19 NOTE — PROGRESS NOTES
"Face to face time with patient: 35 minutes (1524---1559)  Time spent reviewing chart/documentation: > 5 minutes      Assessment & Plan   (G89.4) Pain syndrome, chronic  (primary encounter diagnosis)  (M79.7) Fibromyalgia  Comment: See extensive discussion in narrative below and in patient instructions.   After discussing risks of chronic use of tramadol for management of chronic pain, including those of tolerance and unplanned sudden death, we agreed that she would be a satisfactory candidate to resume use of tramadol, limited to #43 tabs every thirty days.   Plan: traMADol (ULTRAM) 50 MG tablet              BMI:   Estimated body mass index is 27.65 kg/m  as calculated from the following:    Height as of this encounter: 1.61 m (5' 3.39\").    Weight as of this encounter: 71.7 kg (158 lb).       Patient Instructions   We will agree to resume tramadol at 50 mg (one tablet) each morning, with ability to take a second tablet three evenings per week.   I've sent the first month's prescription to your pharmacy.     Ideally we should set up appointments every three months.   Since you will be in [place] for a few months, please schedule an appointment to see me back upon your return to [place] in early May.       No follow-ups on file.    Eric Love MD,   [facility] YAIR Sanchez is a 80 year old who presents for the following health issues : establish care.    HPI     The patient presents today for discussion of her management of chronic pain.    She reports that, for several years, her pain was managed with modest dosing of tramadol.    She reports that this regimen was endorsed by her physiatrist at [facility], Dr Dalia Huynh, with whom she originally consulted in or around 2009.    The patient had been followed for years in this clinic by Dr. Rosen, until his correction, followed by Dr Santiago until her departure.  Chart review shows consistent dosing of tramadol " "at 60 tabs every thirty days until around March of 2020, and then 43 tabs every thirty days until around May of this year.    The patient's new primary care provider advised against prescribing tramadol at their first appointment in May.  She was referred to the [facility] for evaluation and treatment of her chronic pain.  Dr Corina Bradley prescribed tramadol one time, #43 tabs, on 5/19. Any further refills were to have been in the context of a comprehensive program. She tapered tramadol to one tablet every other day for a week, then discontinued it.     The patient was reluctant to follow all of the terms in the program. Her pain pattern worsened.   She reports trying modalities including psychiatry consultation, use of a TENS unit, and water aerobics. She had reported allergies to corticosteroids and injections have been avoided.     She reports that her pain has been constant and interrupting her ability to sleep and to perform her routine daily activities.     We discussed the rationale behind current trends in avoiding use of opioids for management of chronic pain, including risks of developing tolerance and even unintentional death.    This patient expresses confidence that she will be able to adhere to a continued regimen of #43 tabs of tramadol every thirty days. In fact, she has demonstrated an ability to maintain a pattern of use of #43 to at most #60 tabs monthly for several years.     Past medical, family and social histories as well as medications reviewed and updated as needed.    Review of Systems   REVIEW OF SYSTEMS: The following systems have been completely reviewed and are negative except as noted above:   Constitutional,respiratory, cardiovascular, gastrointestinal, musculoskeletal, psychiatric, and neurologic systems.        Objective    /64   Pulse 86   Temp 97.7  F (36.5  C) (Oral)   Resp 16   Ht 1.61 m (5' 3.39\")   Wt 71.7 kg (158 lb)   LMP  (LMP Unknown)   SpO2 96% "   Breastfeeding No   BMI 27.65 kg/m    Body mass index is 27.65 kg/m .     Physical Exam   GENERAL: healthy, alert and no distress  RESP: lungs clear to auscultation - no rales, rhonchi or wheezes  CV: regular rate and rhythm, normal S1 S2, no S3 or S4, no murmur, click or rub, no peripheral edema and peripheral pulses strong  MS: no gross musculoskeletal defects noted, no edema  NEURO: Normal strength and tone, mentation intact and speech normal  PSYCH: mentation appears normal, affect normal/bright

## 2021-11-22 ENCOUNTER — TELEPHONE (OUTPATIENT)
Dept: INTERNAL MEDICINE | Facility: CLINIC | Age: 81
End: 2021-11-22
Payer: MEDICARE

## 2021-11-22 DIAGNOSIS — M79.7 FIBROMYALGIA: ICD-10-CM

## 2021-11-22 DIAGNOSIS — G89.4 PAIN SYNDROME, CHRONIC: ICD-10-CM

## 2021-11-22 RX ORDER — TRAMADOL HYDROCHLORIDE 50 MG/1
50 TABLET ORAL 2 TIMES DAILY PRN
Qty: 43 TABLET | Refills: 0 | Status: SHIPPED | OUTPATIENT
Start: 2021-11-22 | End: 2021-12-22

## 2021-11-22 NOTE — TELEPHONE ENCOUNTER
Call received from patient stating she received a prescription for Tramadol at recent office visit 11/19/21. States [facility] only gave her 6 tablets and was told Dr. Love will need to write a new prescription for a 30 day supply. States she used to get this from Dr. Santiago. Call to pharmacy. States if opioids are not filled every 90 days it goes back to being considered a new/acute prescription. Please send new prescription.     Please call patient once approved.

## 2021-12-20 NOTE — TELEPHONE ENCOUNTER
Both medications refilled 4-22-20 for a 90 day supply with one refill per primary care provider.    Both medications e-scribed x 1 to local pharmacy.    Patient informed.   [Dear  ___] : Dear  [unfilled], [I had the pleasure of evaluating your patient, [unfilled]. Thank you for referring Ms. [unfilled] for consultation for ___] : I had the pleasure of evaluating your patient, [unfilled]. Thank you for referring Ms. [unfilled] for consultation for [unfilled]. [Attached please find my note.] : Attached please find my note. [Thank you very much for allowing me to participate in the care of this patient. If you have any questions, please do not hesitate to contact me] : Thank you very much for allowing me to participate in the care of this patient. If you have any questions, please do not hesitate to contact me.

## 2022-02-02 DIAGNOSIS — J30.2 SEASONAL ALLERGIC RHINITIS, UNSPECIFIED TRIGGER: ICD-10-CM

## 2022-02-02 RX ORDER — IPRATROPIUM BROMIDE 21 UG/1
SPRAY, METERED NASAL
Qty: 10 ML | Refills: 0 | Status: SHIPPED | OUTPATIENT
Start: 2022-02-02 | End: 2023-05-09

## 2022-02-11 ENCOUNTER — TELEPHONE (OUTPATIENT)
Dept: INTERNAL MEDICINE | Facility: CLINIC | Age: 82
End: 2022-02-11
Payer: MEDICARE

## 2022-02-11 DIAGNOSIS — M79.7 FIBROMYALGIA: ICD-10-CM

## 2022-02-13 ENCOUNTER — HEALTH MAINTENANCE LETTER (OUTPATIENT)
Age: 82
End: 2022-02-13

## 2022-02-18 RX ORDER — TRAMADOL HYDROCHLORIDE 50 MG/1
50 TABLET ORAL 2 TIMES DAILY PRN
Qty: 43 TABLET | Refills: 0 | Status: SHIPPED | OUTPATIENT
Start: 2022-02-18 | End: 2022-02-23

## 2022-02-21 NOTE — TELEPHONE ENCOUNTER
Call from [facility]:    There are 2 sets of directions, they need clarification.    Also, this is the first time they are receiving this medication, is this the first time receiving this med?    If yes, they can only give her 7 days worth.    Freda Pressley [facility]  [phone]

## 2022-02-23 RX ORDER — TRAMADOL HYDROCHLORIDE 50 MG/1
50 TABLET ORAL EVERY MORNING
Qty: 43 TABLET | Refills: 0 | Status: SHIPPED | OUTPATIENT
Start: 2022-02-23 | End: 2022-03-31

## 2022-02-23 NOTE — TELEPHONE ENCOUNTER
Call from [facility].    Asking for clarification on Tramadol order.    Last year order stated BID Arnie Burns, Sat.    This year's order has two set of directions with the  quantity of 43.    Please clarify how you would like tramadol to be taken.

## 2022-03-31 DIAGNOSIS — M79.7 FIBROMYALGIA: ICD-10-CM

## 2022-03-31 RX ORDER — TRAMADOL HYDROCHLORIDE 50 MG/1
50 TABLET ORAL EVERY MORNING
Qty: 43 TABLET | Refills: 0 | Status: SHIPPED | OUTPATIENT
Start: 2022-03-31 | End: 2022-05-05

## 2022-03-31 NOTE — TELEPHONE ENCOUNTER
Patient states she will be out of medication by the weekend.  Please fill asap.    Tramadol  Last Written Prescription Date:  02/23/22  Last Fill Quantity: 43,   # refills: 0  Last Office Visit: 11/19/21  Future Office visit:    Next 5 appointments (look out 90 days)    May 10, 2022 10:00 AM  (Arrive by 9:40 AM)  Provider Visit with Eric Love MD  [facility] [address] [facility] [id number] [phone]           Routing refill request to provider for review/approval because:  Drug not on the [facility], P or [facility] refill protocol or controlled substance

## 2022-05-05 DIAGNOSIS — M79.7 FIBROMYALGIA: ICD-10-CM

## 2022-05-05 RX ORDER — TRAMADOL HYDROCHLORIDE 50 MG/1
50 TABLET ORAL EVERY MORNING
Qty: 43 TABLET | Refills: 0 | Status: SHIPPED | OUTPATIENT
Start: 2022-05-05 | End: 2022-05-10

## 2022-05-05 NOTE — TELEPHONE ENCOUNTER
traMADol (ULTRAM) 50 MG tablet      Last Written Prescription Date:  03/31/22  Last Fill Quantity: 43,   # refills: 0  Last Office Visit: 11/19/21  Future Office visit:    Next 5 appointments (look out 90 days)    May 10, 2022 10:00 AM  (Arrive by 9:40 AM)  Provider Visit with Eric Love MD  [facility] ([facility] ) [address] [facility] [address] [phone]           Routing refill request to provider for review/approval because:  Drug not on the [facility], [facility] or [facility] refill protocol or controlled substance

## 2022-05-10 ENCOUNTER — OFFICE VISIT (OUTPATIENT)
Dept: INTERNAL MEDICINE | Facility: CLINIC | Age: 82
End: 2022-05-10
Payer: MEDICARE

## 2022-05-10 VITALS
HEART RATE: 85 BPM | TEMPERATURE: 97.8 F | OXYGEN SATURATION: 96 % | BODY MASS INDEX: 26.75 KG/M2 | WEIGHT: 151 LBS | DIASTOLIC BLOOD PRESSURE: 60 MMHG | HEIGHT: 63 IN | RESPIRATION RATE: 14 BRPM | SYSTOLIC BLOOD PRESSURE: 124 MMHG

## 2022-05-10 DIAGNOSIS — R73.09 ELEVATED GLUCOSE: ICD-10-CM

## 2022-05-10 DIAGNOSIS — G43.019 INTRACTABLE MIGRAINE WITHOUT AURA AND WITHOUT STATUS MIGRAINOSUS: ICD-10-CM

## 2022-05-10 DIAGNOSIS — Z13.6 CARDIOVASCULAR SCREENING; LDL GOAL LESS THAN 130: ICD-10-CM

## 2022-05-10 DIAGNOSIS — Z79.899 CONTROLLED SUBSTANCE AGREEMENT SIGNED: ICD-10-CM

## 2022-05-10 DIAGNOSIS — G89.4 PAIN SYNDROME, CHRONIC: ICD-10-CM

## 2022-05-10 DIAGNOSIS — Z79.899 ENCOUNTER FOR LONG-TERM (CURRENT) USE OF MEDICATIONS: ICD-10-CM

## 2022-05-10 DIAGNOSIS — M79.7 FIBROMYALGIA: Primary | ICD-10-CM

## 2022-05-10 LAB
CREAT UR-MCNC: 186 MG/DL
CRP SERPL-MCNC: 7.8 MG/L (ref 0–8)
ERYTHROCYTE [SEDIMENTATION RATE] IN BLOOD BY WESTERGREN METHOD: 17 MM/HR (ref 0–30)
HBA1C MFR BLD: 6.4 % (ref 0–5.6)

## 2022-05-10 PROCEDURE — 80053 COMPREHEN METABOLIC PANEL: CPT | Performed by: INTERNAL MEDICINE

## 2022-05-10 PROCEDURE — 36415 COLL VENOUS BLD VENIPUNCTURE: CPT | Performed by: INTERNAL MEDICINE

## 2022-05-10 PROCEDURE — 86140 C-REACTIVE PROTEIN: CPT | Performed by: INTERNAL MEDICINE

## 2022-05-10 PROCEDURE — 99214 OFFICE O/P EST MOD 30 MIN: CPT | Performed by: INTERNAL MEDICINE

## 2022-05-10 PROCEDURE — 80307 DRUG TEST PRSMV CHEM ANLYZR: CPT | Performed by: INTERNAL MEDICINE

## 2022-05-10 PROCEDURE — 80061 LIPID PANEL: CPT | Performed by: INTERNAL MEDICINE

## 2022-05-10 PROCEDURE — 83036 HEMOGLOBIN GLYCOSYLATED A1C: CPT | Performed by: INTERNAL MEDICINE

## 2022-05-10 PROCEDURE — 85652 RBC SED RATE AUTOMATED: CPT | Performed by: INTERNAL MEDICINE

## 2022-05-10 RX ORDER — BUTALBITAL, ASPIRIN, AND CAFFEINE 325; 50; 40 MG/1; MG/1; MG/1
1 CAPSULE ORAL EVERY 4 HOURS PRN
Qty: 20 CAPSULE | Refills: 0 | Status: SHIPPED | OUTPATIENT
Start: 2022-05-10 | End: 2023-11-21

## 2022-05-10 RX ORDER — TRAMADOL HYDROCHLORIDE 50 MG/1
50 TABLET ORAL EVERY MORNING
Qty: 43 TABLET | Refills: 0 | Status: SHIPPED | OUTPATIENT
Start: 2022-05-10 | End: 2022-06-16

## 2022-05-10 NOTE — PROGRESS NOTES
"Assessment & Plan   (M79.7) Fibromyalgia  (primary encounter diagnosis)  Comment: Chronic myofascial pain. See epic orders.   Plan: [id number] - Urine Drug Confirmation Panel         (Comprehensive), traMADol (ULTRAM) 50 MG         tablet, CRP, inflammation, ESR: Erythrocyte         sedimentation rate          (G89.4) Pain syndrome, chronic  (Z79.899) Controlled substance agreement signed  Comment: Updated a CSA, refilled meds, ordered urine drug screen.   Plan: [id number] - Urine Drug Confirmation Panel         (Comprehensive)          (Z79.899) Encounter for long-term (current) use of medications    (G43.019) Intractable migraine without aura and without status migrainosus  Comment: Refilled Fiorinal for rare use.   Plan: butalbital-aspirin-caffeine (FIORINAL)         -40 MG capsule          (R73.09) Elevated glucose  Comment: Check glucose and A1c.   Plan: **A1C FUTURE 3mo, **Comprehensive metabolic         panel FUTURE 2mo          (Z13.6) CARDIOVASCULAR SCREENING; LDL GOAL LESS THAN 130  Plan: **Comprehensive metabolic panel FUTURE 2mo,         Lipid panel reflex to direct LDL Fasting    BMI:   Estimated body mass index is 26.42 kg/m  as calculated from the following:    Height as of this encounter: 1.61 m (5' 3.39\").    Weight as of this encounter: 68.5 kg (151 lb).       Patient Instructions   Refilled needed meds, also reviewed and updated a Controlled Substance Agreement.     Stop by [address] for lab tests.     See me in about six months, before leaving for [place], for an Annual Wellness Visit and combined tramadol med check.       Return in about 6 months (around 11/10/2022) for Annual Wellness Visit.    Eric Love MD,   [facility] is [facility] 81 year [facility] who presents for the following health issues : follow up    HPI     The patient continues with chronic pain in her neck, shoulders and upper back. This is worsened when she goes to the club every Tuesday, " "Thursday and Saturday.   She takes tramadol once each morning, and takes a second pill every Tuesday, Thursday and Saturday evening.     We reviewed and updated a CSA today.     She also requests refills of Fiorinal which she takes very infrequently for migraines.   She has had previously elevated glucose readings and we agreed to update this today.     Past medical, family and social histories as well as medications reviewed and updated as needed.    Review of Systems   REVIEW OF SYSTEMS: The following systems have been completely reviewed and are negative except as noted above:   Constitutional, respiratory, cardiovascular, gastrointestinal, musculoskeletal, psychiatric, and neurologic systems.        Objective    /60   Pulse 85   Temp 97.8  F (36.6  C) (Oral)   Resp 14   Ht 1.61 m (5' 3.39\")   Wt 68.5 kg (151 lb)   LMP  (LMP Unknown)   SpO2 96%   Breastfeeding No   BMI 26.42 kg/m    Body mass index is 26.42 kg/m .  Physical Exam   GENERAL: healthy, alert and no distress  RESP: lungs clear to auscultation - no rales, rhonchi or wheezes  CV: regular rate and rhythm, normal S1 S2, no S3 or S4, no murmur, click or rub, no peripheral edema and peripheral pulses strong  MS: no gross musculoskeletal defects noted, no edema  NEURO: Normal strength and tone, mentation intact and speech normal  PSYCH: mentation appears normal, affect normal/bright        "

## 2022-05-10 NOTE — LETTER
Opioid / Opioid Plus Controlled Substance Agreement    This is an agreement between you and your provider about the safe and appropriate use of controlled substance/opioids prescribed by your care team. Controlled substances are medicines that can cause physical and mental dependence (abuse).    There are strict laws about having and using these medicines. We here at [facility] are committing to working with you in your efforts to get better. To support you in this work, we ll help you schedule regular office appointments for medicine refills. If we must cancel or change your appointment for any reason, we ll make sure you have enough medicine to last until your next appointment.     As a Provider, I will:    Listen carefully to your concerns and treat you with respect.     Recommend a treatment plan that I believe is in your best interest. This plan may involve therapies other than opioid pain medication.     Talk with you often about the possible benefits, and the risk of harm of any medicine that we prescribe for you.     Provide a plan on how to taper (discontinue or go off) using this medicine if the decision is made to stop its use.    As a Patient, I understand that opioid(s):     Are a controlled substance prescribed by my care team to help me function or work and manage my condition(s).     Are strong medicines and can cause serious side effects such as:    Drowsiness, which can seriously affect my driving ability    A lower breathing rate, enough to cause death    Harm to my thinking ability     Depression     Abuse of and addiction to this medicine    Need to be taken exactly as prescribed. Combining opioids with certain medicines or chemicals (such as illegal drugs, sedatives, sleeping pills, and benzodiazepines) can be dangerous or even fatal. If I stop opioids suddenly, I may have severe withdrawal symptoms.    Do not work for all types of pain nor for all patients. If they re not helpful, I may  be asked to stop them.      The risks, benefits and side effects of these medicine(s) were explained to me. I agree that:  1. I will take part in other treatments as advised by my care team. This may be psychiatry or counseling, physical therapy, behavioral therapy, group treatment or a referral to a specialist.     2. I will keep all my appointments. I understand that this is part of the monitoring of opioids. My care team may require an office visit for EVERY opioid/controlled substance refill. If I miss appointments or don t follow instructions, my care team may stop my medicine.    3. I will take my medicines as prescribed. I will not change the dose or schedule unless my care team tells me to. There will be no refills if I run out early.     4. I may be asked to come to the clinic and complete a urine drug test or complete a pill count at any time. If I don t give a urine sample or participate in a pill count, the care team may stop my medicine.    5. I will only receive prescriptions from this clinic for chronic pain. If I am treated by another provider for acute pain issues, I will tell them that I am taking opioid pain medication for chronic pain and that I have a treatment agreement with this provider. I will inform my [facility] care team within one business day if I am given a prescription for any pain medication by another healthcare provider. My [facility] care team can contact other providers and pharmacists about my use of any medicines.    6. It is up to me to make sure that I don t run out of my medicines on weekends or holidays. If my care team is willing to refill my opioid prescription without a visit, I must request refills only during office hours. Refills may take up to 3 business days to process. I will use one pharmacy to fill all my opioid and other controlled substance prescriptions. I will notify the clinic about any changes to my insurance or medication  availability.    7. I am responsible for my prescriptions. If the medicine/prescription is lost, stolen or destroyed, it will not be replaced. I also agree not to share controlled substance medicines with anyone.    8. I am aware I should not use any illegal or recreational drugs. I agree not to drink alcohol unless my care team says I can.       9. If I enroll in the [place] [facility] Cannabis program, I will tell my care team prior to my next refill.     10. I will tell my care team right away if I become pregnant, have a new medical problem treated outside of my regular clinic, or have a change in my medications.    11. I understand that this medicine can affect my thinking, judgment and reaction time. Alcohol and drugs affect the brain and body, which can affect the safety of my driving. Being under the influence of alcohol or drugs can affect my decision-making, behaviors, personal safety, and the safety of others. Driving while impaired (DWI) can occur if a person is driving, operating, or in physical control of a car, motorcycle, boat, snowmobile, ATV, motorbike, off-road vehicle, or any other motor vehicle ([place] Statute 169A.20). I understand the risk if I choose to drive or operate any vehicle or machinery.    I understand that if I do not follow any of the conditions above, my prescriptions or treatment may be stopped or changed.          Opioids  What You Need to Know    What are opioids?   Opioids are pain medicines that must be prescribed by a doctor. They are also known as narcotics.     Examples are:   1. morphine (MS Contin, Blank)  2. oxycodone (Oxycontin)  3. oxycodone and acetaminophen (Percocet)  4. hydrocodone and acetaminophen (Vicodin, Norco)   5. fentanyl patch (Duragesic)   6. hydromorphone (Dilaudid)   7. methadone  8. codeine (Tylenol #3)     What do opioids do well?   Opioids are best for severe short-term pain such as after a surgery or injury. They may work well for cancer pain. They may  help some people with long-lasting (chronic) pain.     What do opioids NOT do well?   Opioids never get rid of pain entirely, and they don t work well for most patients with chronic pain. Opioids don t reduce swelling, one of the causes of pain.                                    Other ways to manage chronic pain and improve function include:       Treat the health problem that may be causing pain    Anti-inflammation medicines, which reduce swelling and tenderness, such as ibuprofen (Advil, Motrin) or naproxen (Aleve)    Acetaminophen (Tylenol)    Antidepressants and anti-seizure medicines, especially for nerve pain    Topical treatments such as patches or creams    Injections or nerve blocks    Chiropractic or osteopathic treatment    Acupuncture, massage, deep breathing, meditation, visual imagery, aromatherapy    Use heat or ice at the pain site    Physical therapy     Exercise    Stop smoking    Take part in therapy       Risks and side effects     Talk to your doctor before you start or decide to keep taking opioids. Possible side effects include:      Lowering your breathing rate enough to cause death    Overdose, including death, especially if taking higher than prescribed doses    Worse depression symptoms; less pleasure in things you usually enjoy    Feeling tired or sluggish    Slower thoughts or cloudy thinking    Being more sensitive to pain over time; pain is harder to control    Trouble sleeping or restless sleep    Changes in hormone levels (for example, less testosterone)    Changes in sex drive or ability to have sex    Constipation    Unsafe driving    Itching and sweating    Dizziness    Nausea, throwing up and dry mouth    What else should I know about opioids?    Opioids may lead to dependence, tolerance, or addiction.      Dependence means that if you stop or reduce the medicine too quickly, you will have withdrawal symptoms. These include loose poop (diarrhea), jitters, flu-like symptoms,  nervousness and tremors. Dependence is not the same as addiction.                       Tolerance means needing higher doses over time to get the same effect. This may increase the chance of serious side effects.      Addiction is when people improperly use a substance that harms their body, their mind or their relations with others. Use of opiates can cause a relapse of addiction if you have a history of drug or alcohol abuse.      People who have used opioids for a long time may have a lower quality of life, worse depression, higher levels of pain and more visits to doctors.    You can overdose on opioids. Take these steps to lower your risk of overdose:    1. Recognize the signs:  Signs of overdose include decrease or loss of consciousness (blackout), slowed breathing, trouble waking up and blue lips. If someone is worried about overdose, they should call 911.    2. Talk to your doctor about Narcan (naloxone).   If you are at risk for overdose, you may be given a prescription for Narcan. This medicine very quickly reverses the effects of opioids.   If you overdose, a friend or family member can give you Narcan while waiting for the ambulance. They need to know the signs of overdose and how to give Narcan.     3. Don't use alcohol or street drugs.   Taking them with opioids can cause death.    4. Do not take any of these medicines unless your doctor says it s OK. Taking these with opioids can cause death:    Benzodiazepines, such as lorazepam (Ativan), alprazolam (Xanax) or diazepam (Valium)    Muscle relaxers, such as cyclobenzaprine (Flexeril)    Sleeping pills like zolpidem (Ambien)     Other opioids      How to keep you and other people safe while taking opioids:    1. Never share your opioids with others.  Opioid medicines are regulated by the Drug Enforcement Agency ([employer]). Selling or sharing medications is a criminal act.    2. Be sure to store opioids in a secure place, locked up if possible. Young children  can easily swallow them and overdose.    3. When you are traveling with your medicines, keep them in the original bottles. If you use a pill box, be sure you also carry a copy of your medicine list from your clinic or pharmacy.    4. Safe disposal of opioids    Most pharmacies have places to get rid of medicine, called disposal kiosks. Medicine disposal options are also available in every [facility]. Search your county and  medication disposal  to find more options. You can find more details at:  [email or web address]     I agree that my provider, clinic care team, and pharmacy may work with any city, state or federal law enforcement agency that investigates the misuse, sale, or other diversion of my controlled medicine. I will allow my provider to discuss my care with, or share a copy of, this agreement with any other treating provider, pharmacy or emergency room where I receive care.    I have read this agreement and have asked questions about anything I did not understand.    _______________________________________________________  Patient Signature - Nubia Coronel _____________________                   Date     _______________________________________________________  Provider Signature - Eric Love MD, MD   _____________________                   Date     _______________________________________________________  Witness Signature (required if provider not present while patient signing)   _____________________                   Date

## 2022-05-10 NOTE — PATIENT INSTRUCTIONS
Refilled needed meds, also reviewed and updated a Controlled Substance Agreement.     Stop by [address] for lab tests.     See me in about six months, before leaving for [place], for an Annual Wellness Visit and combined tramadol med check.

## 2022-05-11 ENCOUNTER — HOSPITAL ENCOUNTER (OUTPATIENT)
Dept: MAMMOGRAPHY | Facility: CLINIC | Age: 82
Discharge: HOME | End: 2022-05-11
Attending: OBSTETRICS & GYNECOLOGY | Admitting: OBSTETRICS & GYNECOLOGY
Payer: MEDICARE

## 2022-05-11 DIAGNOSIS — Z12.31 VISIT FOR SCREENING MAMMOGRAM: ICD-10-CM

## 2022-05-11 LAB
ALBUMIN SERPL-MCNC: 3.8 G/DL (ref 3.4–5)
ALP SERPL-CCNC: 91 U/L (ref 40–150)
ALT SERPL W P-5'-P-CCNC: 122 U/L (ref 0–50)
ANION GAP SERPL CALCULATED.3IONS-SCNC: 3 MMOL/L (ref 3–14)
AST SERPL W P-5'-P-CCNC: 75 U/L (ref 0–45)
BILIRUB SERPL-MCNC: 0.4 MG/DL (ref 0.2–1.3)
BUN SERPL-MCNC: 17 MG/DL (ref 7–30)
CALCIUM SERPL-MCNC: 10 MG/DL (ref 8.5–10.1)
CHLORIDE BLD-SCNC: 109 MMOL/L (ref 94–109)
CHOLEST SERPL-MCNC: 174 MG/DL
CO2 SERPL-SCNC: 29 MMOL/L (ref 20–32)
CREAT SERPL-MCNC: 0.8 MG/DL (ref 0.52–1.04)
FASTING STATUS PATIENT QL REPORTED: YES
GFR SERPL CREATININE-BSD FRML MDRD: 74 ML/MIN/1.73M2
GLUCOSE BLD-MCNC: 149 MG/DL (ref 70–99)
HDLC SERPL-MCNC: 76 MG/DL
LDLC SERPL CALC-MCNC: 79 MG/DL
NONHDLC SERPL-MCNC: 98 MG/DL
POTASSIUM BLD-SCNC: 4.2 MMOL/L (ref 3.4–5.3)
PROT SERPL-MCNC: 7.5 G/DL (ref 6.8–8.8)
SODIUM SERPL-SCNC: 141 MMOL/L (ref 133–144)
TRIGL SERPL-MCNC: 97 MG/DL

## 2022-05-11 PROCEDURE — 77067 SCR MAMMO BI INCL CAD: CPT

## 2022-05-13 LAB
N-NORTRAMADOL/CREAT UR CFM: 347 NG/MG {CREAT}
O-NORTRAMADOL UR CFM-MCNC: 645 NG/ML
TRAMADOL CTO UR CFM-MCNC: 161 NG/ML
TRAMADOL/CREAT UR: 87 NG/MG {CREAT}

## 2022-07-06 ENCOUNTER — ANCILLARY PROCEDURE (OUTPATIENT)
Dept: BONE DENSITY | Facility: CLINIC | Age: 82
End: 2022-07-06
Payer: MEDICARE

## 2022-07-06 DIAGNOSIS — Z78.0 ASYMPTOMATIC MENOPAUSAL STATE: ICD-10-CM

## 2022-07-06 PROCEDURE — 77085 DXA BONE DENSITY AXL VRT FX: CPT | Performed by: INTERNAL MEDICINE

## 2022-07-15 DIAGNOSIS — G43.019 INTRACTABLE MIGRAINE WITHOUT AURA AND WITHOUT STATUS MIGRAINOSUS: ICD-10-CM

## 2022-07-18 NOTE — TELEPHONE ENCOUNTER
Pending Prescriptions:                       Disp   Refills    topiramate (TOPAMAX) 25 MG tablet          360 ta*1          Routing refill request to provider for review/approval because:  Needs provider review

## 2022-07-21 RX ORDER — TOPIRAMATE 25 MG/1
TABLET, FILM COATED ORAL
Qty: 360 TABLET | Refills: 1 | Status: SHIPPED | OUTPATIENT
Start: 2022-07-21 | End: 2023-01-20

## 2022-08-24 ENCOUNTER — PREP FOR PROCEDURE (OUTPATIENT)
Dept: ORTHOPEDICS | Facility: CLINIC | Age: 82
End: 2022-08-24

## 2022-08-24 ENCOUNTER — OFFICE VISIT (OUTPATIENT)
Dept: ORTHOPEDICS | Facility: CLINIC | Age: 82
End: 2022-08-24
Payer: MEDICARE

## 2022-08-24 VITALS — DIASTOLIC BLOOD PRESSURE: 74 MMHG | SYSTOLIC BLOOD PRESSURE: 120 MMHG

## 2022-08-24 DIAGNOSIS — M18.11 ARTHRITIS OF CARPOMETACARPAL (CMC) JOINT OF RIGHT THUMB: Primary | ICD-10-CM

## 2022-08-24 DIAGNOSIS — M18.11 PRIMARY OSTEOARTHRITIS OF FIRST CARPOMETACARPAL JOINT OF RIGHT HAND: Primary | ICD-10-CM

## 2022-08-24 PROCEDURE — 99213 OFFICE O/P EST LOW 20 MIN: CPT | Performed by: ORTHOPAEDIC SURGERY

## 2022-08-24 NOTE — LETTER
8/24/2022         RE: Nubia Coronel  [id number] [facility] [id number]        Dear Colleague,    Thank you for referring your patient, Nubia Coronel, to the [facility] [place]. Please see a copy of my visit note below.    HISTORY OF PRESENT ILLNESS:    Nubia Coronel is a 81 year old female who is seen in follow up for right CMC joint pain. Patient was previously evaluated on 11/4/21 for this condition.  At this visit she obtained a right CMC joint injection.  This procedure provided 2 months relief.   Present symptoms: Patient reports right thumb pain. Pain waxes and wanes. She reports at times it causes difficulties sleeping.  Patient reports limited use of the hand with twisting and gripping.   Current pain level: 7/10  Treatments tried to this point: Right CMC cortisone injection, Voltaren Gel, Advil for severe pain, activity avoidance.   Past Medical History: Unchanged from the visit of 6/18/21. Please refer to that note.     REVIEW OF SYSTEMS:  CONSTITUTIONAL:  NEGATIVE for fever, chills, change in weight  INTEGUMENTARY/SKIN:  NEGATIVE for worrisome rashes, moles or lesions  EYES:  NEGATIVE for vision changes or irritation  ENT/MOUTH:  NEGATIVE for ear, mouth and throat problems  RESP:  NEGATIVE for significant cough or SOB  BREAST:  NEGATIVE for masses, tenderness or discharge  CV:  NEGATIVE for chest pain, palpitations or peripheral edema  GI:  NEGATIVE for nausea, abdominal pain, heartburn, or change in bowel habits  :  Negative   MUSCULOSKELETAL:  See HPI above  NEURO:  NEGATIVE for weakness, dizziness or paresthesias  ENDOCRINE:  NEGATIVE for temperature intolerance, skin/hair changes  HEME/ALLERGY/IMMUNE:  NEGATIVE for bleeding problems  PSYCHIATRIC:  NEGATIVE for changes in mood or affect      PHYSICAL EXAM:  LMP  (LMP Unknown)   There is no height or weight on file to calculate BMI.   GENERAL APPEARANCE: healthy, alert and no distress    SKIN: no suspicious lesions or rashes  NEURO: Normal strength and tone, mentation intact and speech normal  VASCULAR:  good pulses, and cappillary refill   LYMPH: no lymphadenopathy   PSYCH:  mentation appears normal and affect normal/bright    MSK:  Not in acute distress  Normal gait    Slightly prominent thumb CMC joint, right  Painful circumduction maneuver  Localized tenderness  No triggering of the thumb    Skin is intact  Circulation is intact    IMAGING INTERPRETATION:  None taken today.      ASSESSMENT:  Chronic right thumb CMC joint DJD    PLAN:  Previous cortisone injection was helpful but lasted only for 2 months.  We talked about the treatment options of continuing with immobilization, additional cortisone injection and possible surgery.    We went over the details of the surgery with the visualization of the previous x-rays.    We talked about the nature of the surgery, potential complications and overall healing time.    At this point, she is interested in more permanent solution and wants to go ahead with a surgical intervention of the thumb CMC joint arthroplasty.    We talked about trapeziectomy and cable fixation which will be done as an outpatient.  We anticipate 4 to 6 weeks of healing and initial 2 weeks of immobilization following the operation.    All the questions were answered.           Jaxon Saeed MD  Dept. Orthopedic Surgery  [facility]       Disclaimer: This note consists of symbols derived from keyboarding, dictation and/or voice recognition software. As a result, there may be errors in the script that have gone undetected. Please consider this when interpreting information found in this chart.        Again, thank you for allowing me to participate in the care of your patient.        Sincerely,        Jaxon Saeed MD

## 2022-08-24 NOTE — PROGRESS NOTES
HISTORY OF PRESENT ILLNESS:    Nubia Coronel is a 81 year old female who is seen in follow up for right CMC joint pain. Patient was previously evaluated on 11/4/21 for this condition.  At this visit she obtained a right CMC joint injection.  This procedure provided 2 months relief.   Present symptoms: Patient reports right thumb pain. Pain waxes and wanes. She reports at times it causes difficulties sleeping.  Patient reports limited use of the hand with twisting and gripping.   Current pain level: 7/10  Treatments tried to this point: Right CMC cortisone injection, Voltaren Gel, Advil for severe pain, activity avoidance.   Past Medical History: Unchanged from the visit of 6/18/21. Please refer to that note.     REVIEW OF SYSTEMS:  CONSTITUTIONAL:  NEGATIVE for fever, chills, change in weight  INTEGUMENTARY/SKIN:  NEGATIVE for worrisome rashes, moles or lesions  EYES:  NEGATIVE for vision changes or irritation  ENT/MOUTH:  NEGATIVE for ear, mouth and throat problems  RESP:  NEGATIVE for significant cough or SOB  BREAST:  NEGATIVE for masses, tenderness or discharge  CV:  NEGATIVE for chest pain, palpitations or peripheral edema  GI:  NEGATIVE for nausea, abdominal pain, heartburn, or change in bowel habits  :  Negative   MUSCULOSKELETAL:  See HPI above  NEURO:  NEGATIVE for weakness, dizziness or paresthesias  ENDOCRINE:  NEGATIVE for temperature intolerance, skin/hair changes  HEME/ALLERGY/IMMUNE:  NEGATIVE for bleeding problems  PSYCHIATRIC:  NEGATIVE for changes in mood or affect      PHYSICAL EXAM:  LMP  (LMP Unknown)   There is no height or weight on file to calculate BMI.   GENERAL APPEARANCE: healthy, alert and no distress   SKIN: no suspicious lesions or rashes  NEURO: Normal strength and tone, mentation intact and speech normal  VASCULAR:  good pulses, and cappillary refill   LYMPH: no lymphadenopathy   PSYCH:  mentation appears normal and affect normal/bright    MSK:  Not in acute  distress  Normal gait    Slightly prominent thumb CMC joint, right  Painful circumduction maneuver  Localized tenderness  No triggering of the thumb    Skin is intact  Circulation is intact    IMAGING INTERPRETATION:  None taken today.      ASSESSMENT:  Chronic right thumb CMC joint DJD    PLAN:  Previous cortisone injection was helpful but lasted only for 2 months.  We talked about the treatment options of continuing with immobilization, additional cortisone injection and possible surgery.    We went over the details of the surgery with the visualization of the previous x-rays.    We talked about the nature of the surgery, potential complications and overall healing time.    At this point, she is interested in more permanent solution and wants to go ahead with a surgical intervention of the thumb CMC joint arthroplasty.    We talked about trapeziectomy and cable fixation which will be done as an outpatient.  We anticipate 4 to 6 weeks of healing and initial 2 weeks of immobilization following the operation.    All the questions were answered.           Jaxon Saeed MD  Dept. Orthopedic Surgery  [facility]       Disclaimer: This note consists of symbols derived from keyboarding, dictation and/or voice recognition software. As a result, there may be errors in the script that have gone undetected. Please consider this when interpreting information found in this chart.

## 2022-08-31 ENCOUNTER — TELEPHONE (OUTPATIENT)
Dept: ORTHOPEDICS | Facility: CLINIC | Age: 82
End: 2022-08-31

## 2022-08-31 DIAGNOSIS — M18.11 ARTHRITIS OF CARPOMETACARPAL (CMC) JOINT OF RIGHT THUMB: Primary | ICD-10-CM

## 2022-08-31 NOTE — TELEPHONE ENCOUNTER
ATC: please place hand therapy.     Type of surgery: right thumb CMC arthroplasty  Location of surgery: Other: [facility]   Date and time of surgery: 10/10/22  Surgeon: Darlin  Pre-Op Appt Date: 9/13/22  Post-Op Appt Date: 10/24/22   Packet sent out: Yes  Pre-cert/Authorization completed:  No  Date: 8/31/22      Anurag Bejarano Surgery Scheduler

## 2022-09-13 ENCOUNTER — OFFICE VISIT (OUTPATIENT)
Dept: INTERNAL MEDICINE | Facility: CLINIC | Age: 82
End: 2022-09-13
Payer: MEDICARE

## 2022-09-13 DIAGNOSIS — L98.9 FOOT LESION: ICD-10-CM

## 2022-09-13 DIAGNOSIS — M79.7 FIBROMYALGIA: ICD-10-CM

## 2022-09-13 DIAGNOSIS — M18.11 PRIMARY OSTEOARTHRITIS OF FIRST CARPOMETACARPAL JOINT OF RIGHT HAND: ICD-10-CM

## 2022-09-13 DIAGNOSIS — R73.9 ELEVATED BLOOD SUGAR: ICD-10-CM

## 2022-09-13 DIAGNOSIS — Z01.818 PRE-OP EXAM: Primary | ICD-10-CM

## 2022-09-13 LAB — HGB BLD-MCNC: 15.4 G/DL (ref 11.7–15.7)

## 2022-09-13 PROCEDURE — 85018 HEMOGLOBIN: CPT | Performed by: FAMILY MEDICINE

## 2022-09-13 PROCEDURE — 36415 COLL VENOUS BLD VENIPUNCTURE: CPT | Performed by: FAMILY MEDICINE

## 2022-09-13 PROCEDURE — 99214 OFFICE O/P EST MOD 30 MIN: CPT | Mod: 25 | Performed by: FAMILY MEDICINE

## 2022-09-13 PROCEDURE — 93000 ELECTROCARDIOGRAM COMPLETE: CPT | Performed by: FAMILY MEDICINE

## 2022-09-13 PROCEDURE — 90471 IMMUNIZATION ADMIN: CPT | Performed by: FAMILY MEDICINE

## 2022-09-13 PROCEDURE — 90715 TDAP VACCINE 7 YRS/> IM: CPT | Performed by: FAMILY MEDICINE

## 2022-09-13 NOTE — PROGRESS NOTES
[place] [address] [facility] [address]  Phone: [phone]  Primary Provider: Eric Love  Pre-op Performing Provider: YENI DANIELS      PREOPERATIVE EVALUATION:  Today's date: 9/13/2022    Nubia Coronel is a 81 year old female who presents for a preoperative evaluation.    Surgical Information:  Surgery/Procedure: Right thumb CMC arthroplasty  Surgery Location: [facility]  Surgeon: Dr. Saeed  Surgery Date: 10/10/2022  Time of Surgery: TBD  Where patient plans to recover: At home with family  Fax number for surgical facility:     Type of Anesthesia Anticipated: to be determined    Assessment & Plan     The proposed surgical procedure is considered LOW risk.    Problem List Items Addressed This Visit     Fibromyalgia      Other Visit Diagnoses     Pre-op exam    -  Primary    Relevant Orders    EKG 12-lead complete w/read - Clinics (Completed)    Basic metabolic panel  (Ca, Cl, CO2, Creat, Gluc, K, Na, BUN)    Hemoglobin    Primary osteoarthritis of first carpometacarpal joint of right hand        Foot lesion        Relevant Orders    Orthopedic  Referral               Risks and Recommendations:  The patient has the following additional risks and recommendations for perioperative complications:   - No identified additional risk factors other than previously addressed      Medications -patient to take topiramate and tramadol on the morning of surgery.  Other meds postop.      RECOMMENDATION:    APPROVAL GIVEN to proceed with proposed procedure, without further diagnostic evaluation.        This was a 25-minute visit including review of chart, interview and exam of patient, review labs, assess medications for preop use especially regarding underlying fibromyalgia, prepare medical records.  [id number]}    Subjective     HPI related to upcoming procedure:     This is an 81-year-old woman who has developed right thumb CMC arthritis and is planning a  surgical reconstruction.    She has no history of heart disease, diabetes, lung disease.  She is bothered by fibromyalgia and we will have her take her morning medications as usual.    She has tolerated anesthesia well with the exception of having nausea.      Preop Questions 9/13/2022   1. Have you ever had a heart attack or stroke? No   2. Have you ever had surgery on your heart or blood vessels, such as a stent placement, a coronary artery bypass, or surgery on an artery in your head, neck, heart, or legs? No   3. Do you have chest pain with activity? No   4. Do you have a history of  heart failure? No   5. Do you currently have a cold, bronchitis or symptoms of other infection? No   6. Do you have a cough, shortness of breath, or wheezing? No   7. Do you or anyone in your family have previous history of blood clots? YES - sister   8. Do you or does anyone in your family have a serious bleeding problem such as prolonged bleeding following surgeries or cuts? No   9. Have you ever had problems with anemia or been told to take iron pills? No   10. Have you had any abnormal blood loss such as black, tarry or bloody stools, or abnormal vaginal bleeding? No   11. Have you ever had a blood transfusion? UNKNOWN -    12. Are you willing to have a blood transfusion if it is medically needed before, during, or after your surgery? Yes   13. Have you or any of your relatives ever had problems with anesthesia? nausea   14. Do you have sleep apnea, excessive snoring or daytime drowsiness? No   15. Do you have any artifical heart valves or other implanted medical devices like a pacemaker, defibrillator, or continuous glucose monitor? No   16. Do you have artificial joints? No   17. Are you allergic to latex? No     Health Care Directive:  Patient has a Health Care Directive on file      Preoperative Review of :   reviewed - Tramadol 50 mg 1 or 2/day, occasional butalbital.          Review of Systems    No fevers or  chills.  No cough or short of breath.  No chest pains, palpitations.  No vomiting or diarrhea.  No abdominal pain.  No edema.  No weakness or dizziness.      Patient Active Problem List    Diagnosis Date Noted     Primary insomnia 12/17/2019     Priority: Medium     Prediabetes 12/17/2019     Priority: Medium     Intractable migraine without aura and without status migrainosus 12/17/2019     Priority: Medium     Encounter for medication refill 07/02/2018     Priority: Medium     Formatting of this note might be different from the original.  Controlled substance agreement for Fiorcet,  Ambien 5mg/HS & Tramadol  on file and signed 7/02/2018.  Designated pharmacy: [facility],  Phone [phone] . Prescribing physician: Dr. Dalia Huynh.    Urine Toxasure Done 7/02/2018    SOAPP SF Score=0       Advance Care Planning 10/24/2017     Priority: Medium     Fibromyalgia 11/15/2016     Priority: Medium     Kidney stone 09/05/2016     Priority: Medium     Bilateral lower extremity edema 07/13/2016     Priority: Medium      Idiopathic.       GERD (gastroesophageal reflux disease) 09/06/2012     Priority: Medium     Degeneration of cervical intervertebral disc 08/29/2011     Priority: Medium     CARDIOVASCULAR SCREENING; LDL GOAL LESS THAN 160 10/31/2010     Priority: Medium     Renal calculus 12/14/2009     Priority: Medium     Sleep disturbance 11/19/2009     Priority: Medium     Myalgia and myositis 07/08/2007     Priority: Medium     Problem list name updated by automated process. Provider to review       Headache 07/02/2003     Priority: Medium     Problem list name updated by automated process. Provider to review       Chronic rhinitis 07/02/2003     Priority: Medium      Past Medical History:   Diagnosis Date     Fibromyalgia      Myofacial muscle pain     having myofacial tx      Osteoarthrosis, unspecified whether generalized or localized, other specified sites     Cervical PALAKD - Dr. Gómez  Seizert     Prediabetes 12/17/2019     Past Surgical History:   Procedure Laterality Date     APPENDECTOMY       CHOLECYSTECTOMY       COLONOSCOPY  2/16/2012    Procedure:COLONOSCOPY; COLONOSCOPY; Surgeon:ELIZABETH SINGH; Location: GI     GYN SURGERY      hysterectomy     HEAD & NECK SURGERY      sinus surgery     ORTHOPEDIC SURGERY      bunions     [facility] NONSPECIFIC PROCEDURE  12/98    Negative MRI of the left shoulder     [facility] NONSPECIFIC PROCEDURE  1/99    Normal bone density scan     [facility] NONSPECIFIC PROCEDURE      Normal pap and mammos     [facility] NONSPECIFIC PROCEDURE  9/01    Negative brain MRI     [facility] NONSPECIFIC PROCEDURE      Negative liver biopy     [facility] NONSPECIFIC PROCEDURE      S/P cholecystectomy     [facility] NONSPECIFIC PROCEDURE      S/P hysterectomy     [facility] NONSPECIFIC PROCEDURE      S/P appendectomy     [facility] NONSPECIFIC PROCEDURE      Negative breast biopsy     Current Outpatient Medications   Medication Sig Dispense Refill     Alpha-Lipoic Acid 300 MG TABS Take by mouth 2 times daily       butalbital-aspirin-caffeine (FIORINAL) -40 MG capsule Take 1 capsule by mouth every 4 hours as needed for headaches 20 capsule 0     cetirizine (ZYRTEC) 10 MG tablet Take 10 mg by mouth daily (Patient not taking: No sig reported)       Collagen-Boron-Hyaluronic Acid ([employer] [facility] TRIPLE ACTION PO) Take 1 capsule by mouth daily       cyanocobalamin (VITAMIN B-12) 1000 MCG tablet Take 1 tablet (1,000 mcg) by mouth daily 90 tablet 0     esomeprazole (NEXIUM) 20 MG DR capsule Take 20 mg by mouth every morning (before breakfast) Take 30-60 minutes before eating.       fexofenadine (ALLEGRA) 180 MG tablet Take 1 tablet (180 mg) by mouth every evening 50 tablet 2     hydrochlorothiazide (HYDRODIURIL) 50 MG tablet TAKE 1 TABLET EVERY DAY 90 tablet 2     ipratropium (ATROVENT) 0.03 % nasal spray USE 2 SPRAYS IN EACH NOSTRIL EVERY 12 HOURS AS NEEDED FOR RHINITIS 10 mL 0     Levocarnitine 500 MG CAPS TID       mometasone  "(NASONEX) 50 MCG/ACT nasal spray Spray 2 sprays into both nostrils every evening (Patient not taking: No sig reported) 17 g 3     Multiple Vitamins-Minerals (QC WOMENS DAILY MULTIVITAMIN PO) Take by mouth 2 times daily       Probiotic Product (PROBIOTIC DAILY) CAPS        topiramate (TOPAMAX) 25 MG tablet TAKE 1 TABLET IN THE MORNING  AND 3 TABLETS IN THE EVENING 360 tablet 1     traMADol (ULTRAM) 50 MG tablet TAKE 1 TABLET BY MOUTH IN THE MORNING AND IN THE EVENING ON  EACH  TUESDAY,  THURSDAY,  AND  Saturday. Need video or office visit by 2022. 43 tablet 0     traZODone (DESYREL) 50 MG tablet TAKE 1 TABLET (50 MG) BY MOUTH AT BEDTIME 90 tablet 3     Triamcinolone Acetonide (NASACORT ALLERGY 24HR NA)        TUMS 500 OR 2 tabs q pm       UNABLE TO FIND Take 500 mg by mouth daily MEDICATION NAME: L Carnitine       UNABLE TO FIND Take 1 capsule by mouth At Bedtime MEDICATION NAME: Tumeric Curcuman       VITAMIN D 1000 UNIT OR TABS 1 daily         Allergies   Allergen Reactions     Cortisone Hives     Prick and intradermal tests with various steroids negatives, including Hydrocortisone succinate, methylprednisolone succinate, Triamcinolone, Dexamethasone. If these steroids are necessary, they can be given. Premedicate patient with antihistamines and keep an eye on patient for 1h        Social History     Tobacco Use     Smoking status: Former Smoker     Quit date: 10/1/1981     Years since quittin.9     Smokeless tobacco: Never Used   Substance Use Topics     Alcohol use: Yes     Comment: social       History   Drug Use No         Objective     BP (!) 140/74   Pulse 86   Temp 97.9  F (36.6  C) (Tympanic)   Resp 18   Ht 1.61 m (5' 3.39\")   Wt 68 kg (150 lb)   LMP  (LMP Unknown)   SpO2 96%   BMI 26.25 kg/m      Physical Exam    Thin, alert woman.  HEENT unremarkable.  Neck without masses.  Lungs clear.  Cardiac RSR, normal rate, no murmur.  Abdomen nontender, nondistended.  Extremities CMC arthritic " change.  No edema.  Speech, motor, gait WNL.      Recent Labs   Lab Test 05/10/22  1114 06/07/21  0945    141   POTASSIUM 4.2 3.8   CR 0.80 0.73   A1C 6.4*  --         Diagnostics:      Results for orders placed or performed in visit on 09/13/22   Basic metabolic panel     Status: Abnormal   Result Value Ref Range    Sodium 140 136 - 145 mmol/L    Potassium 3.9 3.4 - 5.3 mmol/L    Chloride 100 98 - 107 mmol/L    Carbon Dioxide (CO2) 29 22 - 29 mmol/L    Anion Gap 11 7 - 15 mmol/L    Urea Nitrogen 16.4 8.0 - 23.0 mg/dL    Creatinine 0.81 0.51 - 0.95 mg/dL    Calcium 10.2 8.8 - 10.2 mg/dL    Glucose 173 (H) 70 - 99 mg/dL    GFR Estimate 73 >60 mL/min/1.73m2   Results for orders placed or performed in visit on 09/13/22   Hemoglobin     Status: Normal   Result Value Ref Range    Hemoglobin 15.4 11.7 - 15.7 g/dL         EKG  RSR, rate 68.  Axis normal.  Conduction normal.  Intervals normal.  ST segments and T waves WNL.  Question of anterior scar although I think it may be lead placement on V3.  No ectopy.  Tracing is very similar to that of 3-.  EKG may be WNL.      Revised Cardiac Risk Index (RCRI):  The patient has the following serious cardiovascular risks for perioperative complications:   - No serious cardiac risks = 0 points     RCRI Interpretation: 0 points: Class I (very low risk - 0.4% complication rate)           Signed Electronically by: Simba Ball MD  Copy of this evaluation report is provided to requesting physician.        Addendum: BP at [facility] 11/15/2022 was 139/63.  Eric Love MD

## 2022-09-15 ENCOUNTER — VIRTUAL VISIT (OUTPATIENT)
Dept: INTERNAL MEDICINE | Facility: CLINIC | Age: 82
End: 2022-09-15
Payer: MEDICARE

## 2022-09-15 ENCOUNTER — NURSE TRIAGE (OUTPATIENT)
Dept: INTERNAL MEDICINE | Facility: CLINIC | Age: 82
End: 2022-09-15

## 2022-09-15 ENCOUNTER — LAB (OUTPATIENT)
Dept: LAB | Facility: CLINIC | Age: 82
End: 2022-09-15
Payer: MEDICARE

## 2022-09-15 DIAGNOSIS — R73.9 ELEVATED BLOOD SUGAR: ICD-10-CM

## 2022-09-15 DIAGNOSIS — Z98.890 HISTORY OF SINUS SURGERY: ICD-10-CM

## 2022-09-15 DIAGNOSIS — J32.9 CHRONIC SINUSITIS, UNSPECIFIED LOCATION: Primary | ICD-10-CM

## 2022-09-15 LAB — HBA1C MFR BLD: 6.5 % (ref 0–5.6)

## 2022-09-15 PROCEDURE — 36415 COLL VENOUS BLD VENIPUNCTURE: CPT

## 2022-09-15 PROCEDURE — 83036 HEMOGLOBIN GLYCOSYLATED A1C: CPT

## 2022-09-15 PROCEDURE — 99213 OFFICE O/P EST LOW 20 MIN: CPT | Mod: 95 | Performed by: INTERNAL MEDICINE

## 2022-09-15 NOTE — TELEPHONE ENCOUNTER
Call to patient. States she is having yellow mucus. Denies fevers. States she usually does not get fevers when she has a sinus infection. Symptoms started during the night. States if she doesn't address the symptoms right away it usually moves into her chest. She is also concerned because she has a surgery coming up. Reports feeling like her head is plugged and has slight facial pain. Also is feeling fatigued even though she slept well last night. States this is also usually a symptom she has with sinus infections. Patient has chronic sinus infections. Pain is mild-moderate and has increased as the day has gone on. Advised virtual visit. Transferred to scheduling.     Additional Information    Negative: Sounds like a life-threatening emergency to the triager    Negative: Difficulty breathing, and not from stuffy nose (e.g., not relieved by cleaning out the nose)    Negative: SEVERE headache and has fever    Negative: Patient sounds very sick or weak to the triager    Negative: SEVERE sinus pain    Negative: Severe headache    Negative: Redness or swelling on the cheek, forehead, or around the eye    Negative: Fever > 103 F (39.4 C)    Negative: Fever > 101 F (38.3 C) and over 60 years of age    Negative: Fever > 100.0 F (37.8 C) and has diabetes mellitus or a weak immune system (e.g., HIV positive, cancer chemotherapy, organ transplant, splenectomy, chronic steroids)    Negative: Fever > 100.0 F (37.8 C) and bedridden (e.g., nursing home patient, stroke, chronic illness, recovering from surgery)    Negative: Fever present > 3 days (72 hours)    Negative: Fever returns after gone for over 24 hours and symptoms worse or not improved    Negative: Sinus pain (not just congestion) and fever    Negative: Earache    Negative: Sinus congestion (pressure, fullness) present > 10 days    Negative: Nasal discharge present > 10 days    Negative: Using nasal washes and pain medicine > 24 hours and sinus pain (lower forehead,  cheekbone, or eye) persists    Negative: Lots of coughing    Negative: Patient wants to be seen    Negative: Sinus congestion as part of a cold, present < 10 days    Protocols used: SINUS PAIN OR CONGESTION-A-OH

## 2022-09-15 NOTE — TELEPHONE ENCOUNTER
Patient calls c/o sinus Infection.  She said she woke up today with an infection. She said she had a pre op op this week but did not mention it. She stated she has them frequently and is asking for a script.       Best number to call back [phone]

## 2022-09-15 NOTE — PROGRESS NOTES
Laura is a 81 year old who is being evaluated via a billable video visit.      How would you like to obtain your AVS? MyChart  If the video visit is dropped, the invitation should be resent by: Text to cell phone: [phone]  Will anyone else be joining your video visit? No        Assessment & Plan     Chronic sinusitis, unspecified location  Seeing this patient via a virtual office visit regarding her concerns over recurrent flare-up of sinus infection symptoms . She paints a compelling case for someone with a truly refractory chronic sinus condition and I am very concerned that she has not seen an Ear, Nose, and Throat specialist in many many years b  - amoxicillin-clavulanate (AUGMENTIN) 875-125 MG tablet; Take 1 tablet by mouth 2 times daily  - Adult ENT  Referral; Future    History of sinus surgery  As above   - amoxicillin-clavulanate (AUGMENTIN) 875-125 MG tablet; Take 1 tablet by mouth 2 times daily  - Adult ENT  Referral; Future    Prescription drug management  15 minutes spent on the date of the encounter doing chart review, history and exam, documentation and further activities per the note      Return if symptoms worsen or fail to improve.    Stepohn Gee MD  [facility]    Naveen Sanchez is a 81 year old presenting for the following health issues:  No chief complaint on file.    Encounter Diagnoses   Name Primary?     Chronic sinusitis, unspecified location Yes     History of sinus surgery         HPI     Patient states she has chronic sinus infections   COVID-19 Symptom Review  How many days ago did these symptoms start? 1 day    Are any of the following symptoms significant for you?    New or worsening difficulty breathing? No    Worsening cough? No    Fever or chills? No    Headache: No    Sore throat: No    Chest pain: No    Diarrhea: No    Body aches? No    Patient has no symptoms of Coronavirus (COVID-19)  , she has no symptoms of this   She explains  "that she has her \" typical sinus infection symptoms\"     Symptoms of head getting full\"  Dark mucus , not coughing , not sneezing , the mucus coming out, no sore throat , might get symptoms of     2 sinus infections a year    over the winter, in [facility] February she waited and then got into urgent care clinic but was apparently given a \" talking to\" by the urgent care clinic physician and she wasn't supposed to \" wait it out\" but to get in sooner.   She had \" one\" in August [ last month ] and had treatment then but   Unfortunately her symptoms have recurred again and again, probably now for about 30-40 years of recurrent chronic sinus infections     Had sinus surgery in 2002, and that benefit lasted about 6 months. Everything went south again. Further surgery was suggested but patient declined that due to hardship.   She also does take antihistamine every single night so that reduced the frequent of these infections from four times a year to two times a year         What treatments has patient tried? Antihistamines    Does patient live in a nursing home, group home, or shelter? No  Does patient have a way to get food/medications during quarantined? Yes, I have a friend or family member who can help me.        Review of Systems   Constitutional, HEENT, cardiovascular, pulmonary, gi and gu systems are negative, except as otherwise noted.      Objective           Vitals:  No vitals were obtained today due to virtual visit.    Physical Exam   GENERAL: Healthy, alert and no distress  EYES: Eyes grossly normal to inspection.  No discharge or erythema, or obvious scleral/conjunctival abnormalities.  RESP: No audible wheeze, cough, or visible cyanosis.  No visible retractions or increased work of breathing.    SKIN: Visible skin clear. No significant rash, abnormal pigmentation or lesions.  NEURO: Cranial nerves grossly intact.  Mentation and speech appropriate for age.  PSYCH: Mentation appears normal, affect " normal/bright, judgement and insight intact, normal speech and appearance well-groomed.    Orders Placed This Encounter   Procedures     Adult ENT  Referral             Video-Visit Details    Video Start Time: 6:07 PM    Type of service:  Video Visit    Video End Time:6:21 PM    Originating Location (pt. Location): Home    Distant Location (provider location):  [facility]     Platform used for Video Visit: Migoa

## 2022-10-05 ENCOUNTER — OFFICE VISIT (OUTPATIENT)
Dept: PODIATRY | Facility: CLINIC | Age: 82
End: 2022-10-05
Payer: MEDICARE

## 2022-10-05 VITALS — BODY MASS INDEX: 25.27 KG/M2 | WEIGHT: 148 LBS | HEIGHT: 64 IN

## 2022-10-05 DIAGNOSIS — B35.1 ONYCHOMYCOSIS OF TOENAIL: ICD-10-CM

## 2022-10-05 DIAGNOSIS — L84 CALLUS: ICD-10-CM

## 2022-10-05 DIAGNOSIS — M21.6X2 PLANTAR FAT PAD ATROPHY OF LEFT FOOT: ICD-10-CM

## 2022-10-05 DIAGNOSIS — M79.672 LEFT FOOT PAIN: Primary | ICD-10-CM

## 2022-10-05 PROCEDURE — 99204 OFFICE O/P NEW MOD 45 MIN: CPT | Performed by: PODIATRIST

## 2022-10-05 RX ORDER — UREA 200 MG/G
CREAM TOPICAL DAILY
Qty: 85 G | Refills: 5 | Status: SHIPPED | OUTPATIENT
Start: 2022-10-05 | End: 2023-05-03

## 2022-10-05 RX ORDER — CICLOPIROX OLAMINE 7.7 MG/G
CREAM TOPICAL DAILY
Qty: 30 G | Refills: 6 | Status: SHIPPED | OUTPATIENT
Start: 2022-10-05 | End: 2023-05-03

## 2022-10-05 NOTE — PATIENT INSTRUCTIONS
Thank you for choosing [facility] Podiatry / Foot & Ankle Surgery!    DR NAGY'S CLINIC:  [facility]   [address]   [place] [address]      TRIAGE LINE: [phone]  APPOINTMENTS: [phone]  RADIOLOGY: [phone]  SET UP SURGERY: [phone]  FAX NUMBER: [phone]  BILLING QUESTIONS: [phone]       Follow up: as needed.       NAIL FUNGUS / ONYCHOMYCOSIS   Nail fungus is not a hygiene problem and will likely not lead to significant medical problems. The nails may get thick causing pain and possibly local skin infection. Treatments include debridement (trimming), oral antifungals, topical antifungals and complete removal of the nail. Most fungal nails are not treated.     Topicals such as tea tree oil can be helpful for surface fungus and may, at best, limit progression. Over the counter creams (such as Lamisil) can also be used however, their effectiveness is also quite low.  Topical treatment with Pen lac is expensive and often not covered by insurance. Pen lac has an approximate 8% success rate. Topical therapy recommendations is to apply twice a day for at least 3-4 months as it takes 9 months for new nail to grow out.     Experts suggest soaking your feet for 15 to 20 minutes in a mixture of 1 cup vinegar to 4 cups warm water. Be sure to rinse well and pat your feet dry when you're done. You can soak your feet like this daily. But if your skin becomes irritated, try soaking only two to three times a week. Vicks VapoRub, as with vinegar, there have been no controlled clinical trials to assess the effectiveness of Vicks VapoRub on nail fungus, but there have been numerous anecdotal reports that it works. There's no consensus on how often to apply this product, so check with your doctor before using it on your nails.      Oral therapies include Sporanox and Lamisil. Oral therapies are also expensive and not very effective. Side effects such as liver disease are the  main concern. Return of fungus is common even if the treatment worked.      Other Tips:  - Penlac nail medication apply daily x 4 months; remove old polish first day of each week  - Antifungal cream/powder (Zeasorb) - apply daily to feet and shoes x 2 months  - Clean shoes with Lysol or in washing machine every few weeks  - Rotate shoe gear; give them 24 hours to dry out between days wearing them  - Clean pair of socks in morning, clean pair in afternoon if your feet sweat  - Shower shoes used in public showers/pools           CALLUS / CORNS / IPKs  When there is excessive friction or pressure on the skin, the body responds by making the skin thicker to protect the deeper structures from becoming exposed. While this works well to protect the deeper structures, the thickened skin can increase pressure and pain.    CALLUS: Flat, diffuse thickening are simple calluses and they are usually caused by friction. Often these are the result of rubbing on a shoe or going barefoot.    CORNS: Calluses with a central core between the toes are called corns. These result from prominent joints on adjacent toes rubbing together. Theses are a symptom of bone malalignment and will always recur unless the underlying bones are addressed surgically.    IPKs: Calluses with a central core on the ball of the foot are usually IPKs (intractable plantar keratosis). These are caused by excessive pressure from the metatarsals, the bones that make up the ball of the foot. Often one of these bones is too long or too prominent.  Again, these will always recur unless the underlying bone issue is addressed. There is no cure for these. They will either go away by themselves, recur, or more could develop.    ROUTINE MAINTENANCE  1. File them down with a pumice stone or callus file a couple times a week.   2. An electric callus removing device. Amope Pedi Perfect Electronic Pedicure Foot File and Callus Remover can be a good option.   3. Lotion can be  applied to soften the callus. A urea based cream such as Kersal or Vanicream or thicker cream with shea butter are good options.  4. Toe spacers or toe covers can be used for corns, gel pads can be used for other lesions on the bottom of the foot.   If there is a surgical pathology noted, such as a prominent bone, often this needs to be addressed surgically to minimize recurrence. However, sometimes the lesion simply migrates to another spot after surgery, so it is not a guaranteed cure.     **If you come back to clinic for treatment, insurance does not cover it, and you would be billed. This charge could range from $100 - $227**

## 2022-10-05 NOTE — PROGRESS NOTES
PATIENT HISTORY:  Dr. Ball requested I see this patient for their foot issue.  Nubia Coronel is a 81 year old female who presents to clinic for foot lesion.  Painful possible wart on the left foot.  She notes that when she is walking it is very sore.  Is been there for years.  She is coming back.  She also notes that her nails are darkened and have fungus and she is wondering what can be done for that.  The foot pain can be 7 out of 10 with pressure.  Denies specific injury.    Review of Systems:  Patient denies fever, chills, rash, wound, stiffness, , numbness, weakness, heart burn, blood in stool, chest pain with activity, calf pain when walking, shortness of breath with activity, chronic cough, easy bleeding/bruising, swelling of ankles, excessive thirst, fatigue, depression, anxiety.  Patient admits to limping at times.     PAST MEDICAL HISTORY:   Past Medical History:   Diagnosis Date     Fibromyalgia      Myofacial muscle pain     having myofacial tx      Osteoarthrosis, unspecified whether generalized or localized, other specified sites     Cervical FRANCISCO - Dr. Dalia Huynh     Prediabetes 12/17/2019        PAST SURGICAL HISTORY:   Past Surgical History:   Procedure Laterality Date     APPENDECTOMY       CHOLECYSTECTOMY       COLONOSCOPY  2/16/2012    Procedure:COLONOSCOPY; COLONOSCOPY; Surgeon:ELIZABETH SINGH; Location: GI     GYN SURGERY      hysterectomy     HEAD & NECK SURGERY      sinus surgery     ORTHOPEDIC SURGERY      bunions     [facility] NONSPECIFIC PROCEDURE  12/98    Negative MRI of the left shoulder     Z NONSPECIFIC PROCEDURE  1/99    Normal bone density scan     ZZ NONSPECIFIC PROCEDURE      Normal pap and mammos     ZZ NONSPECIFIC PROCEDURE  9/01    Negative brain MRI     ZZ NONSPECIFIC PROCEDURE      Negative liver biopy     ZZ NONSPECIFIC PROCEDURE      S/P cholecystectomy     Z NONSPECIFIC PROCEDURE      S/P hysterectomy     ZZC NONSPECIFIC PROCEDURE      S/P appendectomy      ZZC NONSPECIFIC PROCEDURE      Negative breast biopsy        MEDICATIONS:   Current Outpatient Medications:      Alpha-Lipoic Acid 300 MG TABS, Take by mouth 2 times daily, Disp: , Rfl:      amoxicillin-clavulanate (AUGMENTIN) 875-125 MG tablet, Take 1 tablet by mouth 2 times daily, Disp: 28 tablet, Rfl: 0     butalbital-aspirin-caffeine (FIORINAL) -40 MG capsule, Take 1 capsule by mouth every 4 hours as needed for headaches, Disp: 20 capsule, Rfl: 0     cetirizine (ZYRTEC) 10 MG tablet, Take 10 mg by mouth daily (Patient not taking: Reported on 9/15/2022), Disp: , Rfl:      Collagen-Boron-Hyaluronic Acid ([employer] TRIPLE ACTION PO), Take 1 capsule by mouth daily, Disp: , Rfl:      cyanocobalamin (VITAMIN B-12) 1000 MCG tablet, Take 1 tablet (1,000 mcg) by mouth daily, Disp: 90 tablet, Rfl: 0     esomeprazole (NEXIUM) 20 MG DR capsule, Take 20 mg by mouth every morning (before breakfast) Take 30-60 minutes before eating., Disp: , Rfl:      fexofenadine (ALLEGRA) 180 MG tablet, Take 1 tablet (180 mg) by mouth every evening, Disp: 50 tablet, Rfl: 2     hydrochlorothiazide (HYDRODIURIL) 50 MG tablet, TAKE 1 TABLET EVERY DAY, Disp: 90 tablet, Rfl: 2     ipratropium (ATROVENT) 0.03 % nasal spray, USE 2 SPRAYS IN EACH NOSTRIL EVERY 12 HOURS AS NEEDED FOR RHINITIS (Patient not taking: Reported on 9/15/2022), Disp: 10 mL, Rfl: 0     Levocarnitine 500 MG CAPS, TID, Disp: , Rfl:      mometasone (NASONEX) 50 MCG/ACT nasal spray, Spray 2 sprays into both nostrils every evening, Disp: 17 g, Rfl: 3     Multiple Vitamins-Minerals (QC WOMENS DAILY MULTIVITAMIN PO), Take by mouth 2 times daily, Disp: , Rfl:      Probiotic Product (PROBIOTIC DAILY) CAPS, , Disp: , Rfl:      topiramate (TOPAMAX) 25 MG tablet, TAKE 1 TABLET IN THE MORNING  AND 3 TABLETS IN THE EVENING, Disp: 360 tablet, Rfl: 1     traMADol (ULTRAM) 50 MG tablet, TAKE 1 TABLET BY MOUTH IN THE MORNING AND 1 IN THE EVENING ON  TUESDAY,  THURSDAY  AND   SATURDAY, Disp: 43 tablet, Rfl: 0     traZODone (DESYREL) 50 MG tablet, TAKE 1 TABLET (50 MG) BY MOUTH AT BEDTIME, Disp: 90 tablet, Rfl: 3     Triamcinolone Acetonide (NASACORT ALLERGY 24HR NA), , Disp: , Rfl:      TUMS 500 OR, 2 tabs q pm, Disp: , Rfl:      UNABLE TO FIND, Take 500 mg by mouth daily MEDICATION NAME: L Carnitine, Disp: , Rfl:      UNABLE TO FIND, Take 1 capsule by mouth At Bedtime MEDICATION NAME: Tumeric Curcuman, Disp: , Rfl:      VITAMIN D 1000 UNIT OR TABS, 1 daily, Disp: , Rfl:     Current Facility-Administered Medications:      betamethasone acet & sod phos (CELESTONE) injection 6 mg, 6 mg, Intramuscular, Once, Lamberto Pindea MD     hydrocortisone sodium succinate PF (solu-CORTEF) injection 50 mg, 50 mg, Intravenous, Once, Lamberto Pineda MD     hydrocortisone sodium succinate PF (solu-CORTEF) injection 50 mg, 50 mg, Intravenous, Once, Lamberto Pineda MD     methylPREDNISolone sodium succinate (solu-MEDROL) injection 40 mg, 40 mg, Intravenous, Once, Lamberto Pineda MD     ALLERGIES:    Allergies   Allergen Reactions     Cortisone Hives     Prick and intradermal tests with various steroids negatives, including Hydrocortisone succinate, methylprednisolone succinate, Triamcinolone, Dexamethasone. If these steroids are necessary, they can be given. Premedicate patient with antihistamines and keep an eye on patient for 1h        SOCIAL HISTORY:   Social History     Socioeconomic History     Marital status:      Spouse name: Not on file     Number of children: Not on file     Years of education: Not on file     Highest education level: Not on file   Occupational History     Not on file   Tobacco Use     Smoking status: Former Smoker     Quit date: 10/1/1981     Years since quittin.0     Smokeless tobacco: Never Used   Vaping Use     Vaping Use: Never used   Substance and Sexual Activity     Alcohol use: Not Currently     Comment: social     Drug use: No     Sexual activity:  Not Currently     Partners: Male   Other Topics Concern     Parent/sibling w/ CABG, MI or angioplasty before 65F 55M? Not Asked   Social History Narrative     Not on file     Social Determinants of Health     Financial Resource Strain: Not on file   Food Insecurity: Not on file   Transportation Needs: Not on file   Physical Activity: Not on file   Stress: Not on file   Social Connections: Not on file   Intimate Partner Violence: Not on file   Housing Stability: Not on file        FAMILY HISTORY:   Family History   Problem Relation Age of Onset     C.A.D. Father           75 yo MI     Family History Negative Mother         born 191     Respiratory Brother          68 yo Emphysema (smoker)     Diabetes Brother      C.A.D. Brother         pacemaker     Blood Disease Sister         Factor 2 - blood clots,allergies     Family History Negative Son      Family History Negative Son      Family History Negative Son         EXAM:Vitals: LMP  (LMP Unknown)   BMI= There is no height or weight on file to calculate BMI.      General appearance: Patient is alert and fully cooperative with history & exam.  No sign of distress is noted during the visit.     Psychiatric: Affect is pleasant & appropriate.  Patient appears motivated to improve health.     Respiratory: Breathing is regular & unlabored while sitting.     HEENT: Hearing is intact to spoken word.  Speech is clear.  No gross evidence of visual impairment that would impact ambulation.     Dermatologic: Localized hyperkeratotic lesion to the plantar aspect of the left first metatarsal head.  No open lesions or signs of infection noted.  Nails 1 3 and 5 bilateral are darkened, dystrophic and present with subungual debris.     Vascular: DP & PT pulses are intact & regular bilaterally.  No significant edema or varicosities noted.  CFT and skin temperature is normal to both lower extremities.     Neurologic: Lower extremity sensation is intact to light touch.  No  evidence of weakness or contracture in the lower extremities.  No evidence of neuropathy.     Musculoskeletal: Patient is ambulatory without assistive device or brace.  Prominent metatarsal heads plantarly of the.     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 Discussed causes and treatments of nail fungus.  Explained that even if a culture comes back negative, a patient could still have nail fungus.  Discussed treatment options with patient and explained that there isn't one treatment that is 100% effective.  Discussed oral lamisil which is the most effective at about 70% but which can have liver effects.  Explained that if she wanted to try this that she would need serial blood draws to test her liver function.  Discussed over the counter antifungal creams.  Explained that these are about 50% effective and need to be applied once a day for about 6-8months.  Also talked about prescription penlac which is a nail laquer.  Again this is also only 50% effective.  Also discussed that if there was damage to the nail and the nail is now dystrophic that non of the above is going to change the nail.  If there was damage, there is note anything that can be done for the nail to correct it.  Discussed that if it becomes painful, we can remove the nail in clinic.        Patient was given an order for antifungal cream.  She will apply this daily for the next 6 to 12 months.  She will follow.    Discussed causes of keratomas.  They are due to areas of increase friction.  Hammertoes can create these as they put more pressure to the metatarsal head.  Discussed treatments such as using foot file, pumice stone, metatarsal pads, orthotics, and not walking barefoot.     We discussed the cost structure of callus care if they were to come back and have it treated in the clinic if insurance does not cover it and  explained that they would be billed. They were also provided information on places to get the callus treatment.    Recommend using a pumice stone once or twice a week and motioning daily.  She was given an order for this.  All questions were answered to patient's satisfaction and she will call for questions or concerns.    Patient risk factor: Patient is at low risk for infection.        Estefany Paniagua DPM, Podiatry/Foot and Ankle Surgery

## 2022-10-05 NOTE — LETTER
10/5/2022         RE: Nubia Coronel  [address] [facility] [address]        Dear Colleague,    Thank you for referring your patient, Nubia Coronel, to the [facility]. Please see a copy of my visit note below.    PATIENT HISTORY:  Dr. Ball requested I see this patient for their foot issue.  Nubia Coronel is a 81 year old female who presents to clinic for foot lesion.  Painful possible wart on the left foot.  She notes that when she is walking it is very sore.  Is been there for years.  She is coming back.  She also notes that her nails are darkened and have fungus and she is wondering what can be done for that.  The foot pain can be 7 out of 10 with pressure.  Denies specific injury.    Review of Systems:  Patient denies fever, chills, rash, wound, stiffness, , numbness, weakness, heart burn, blood in stool, chest pain with activity, calf pain when walking, shortness of breath with activity, chronic cough, easy bleeding/bruising, swelling of ankles, excessive thirst, fatigue, depression, anxiety.  Patient admits to limping at times.     PAST MEDICAL HISTORY:   Past Medical History:   Diagnosis Date     Fibromyalgia      Myofacial muscle pain     having myofacial tx      Osteoarthrosis, unspecified whether generalized or localized, other specified sites     Cervical DJD - Dr. Dalia Huynh     Prediabetes 12/17/2019        PAST SURGICAL HISTORY:   Past Surgical History:   Procedure Laterality Date     APPENDECTOMY       CHOLECYSTECTOMY       COLONOSCOPY  2/16/2012    Procedure:COLONOSCOPY; COLONOSCOPY; Surgeon:ELIZABETH SINGH; Location: GI     GYN SURGERY      hysterectomy     HEAD & NECK SURGERY      sinus surgery     ORTHOPEDIC SURGERY      bunions     [facility] NONSPECIFIC PROCEDURE  12/98    Negative MRI of the left shoulder     [facility] NONSPECIFIC PROCEDURE  1/99    Normal bone density scan     [facility] NONSPECIFIC PROCEDURE      Normal pap and mammos      [facility] NONSPECIFIC PROCEDURE  9/01    Negative brain MRI     [facility] NONSPECIFIC PROCEDURE      Negative liver biopy     Z NONSPECIFIC PROCEDURE      S/P cholecystectomy     ZZ NONSPECIFIC PROCEDURE      S/P hysterectomy     [facility] NONSPECIFIC PROCEDURE      S/P appendectomy     ZZC NONSPECIFIC PROCEDURE      Negative breast biopsy        MEDICATIONS:   Current Outpatient Medications:      Alpha-Lipoic Acid 300 MG TABS, Take by mouth 2 times daily, Disp: , Rfl:      amoxicillin-clavulanate (AUGMENTIN) 875-125 MG tablet, Take 1 tablet by mouth 2 times daily, Disp: 28 tablet, Rfl: 0     butalbital-aspirin-caffeine (FIORINAL) -40 MG capsule, Take 1 capsule by mouth every 4 hours as needed for headaches, Disp: 20 capsule, Rfl: 0     cetirizine (ZYRTEC) 10 MG tablet, Take 10 mg by mouth daily (Patient not taking: Reported on 9/15/2022), Disp: , Rfl:      Collagen-Boron-Hyaluronic Acid ([employer] TRIPLE ACTION PO), Take 1 capsule by mouth daily, Disp: , Rfl:      cyanocobalamin (VITAMIN B-12) 1000 MCG tablet, Take 1 tablet (1,000 mcg) by mouth daily, Disp: 90 tablet, Rfl: 0     esomeprazole (NEXIUM) 20 MG DR capsule, Take 20 mg by mouth every morning (before breakfast) Take 30-60 minutes before eating., Disp: , Rfl:      fexofenadine (ALLEGRA) 180 MG tablet, Take 1 tablet (180 mg) by mouth every evening, Disp: 50 tablet, Rfl: 2     hydrochlorothiazide (HYDRODIURIL) 50 MG tablet, TAKE 1 TABLET EVERY DAY, Disp: 90 tablet, Rfl: 2     ipratropium (ATROVENT) 0.03 % nasal spray, USE 2 SPRAYS IN EACH NOSTRIL EVERY 12 HOURS AS NEEDED FOR RHINITIS (Patient not taking: Reported on 9/15/2022), Disp: 10 mL, Rfl: 0     Levocarnitine 500 MG CAPS, TID, Disp: , Rfl:      mometasone (NASONEX) 50 MCG/ACT nasal spray, Spray 2 sprays into both nostrils every evening, Disp: 17 g, Rfl: 3     Multiple Vitamins-Minerals (QC WOMENS DAILY MULTIVITAMIN PO), Take by mouth 2 times daily, Disp: , Rfl:      Probiotic Product (PROBIOTIC DAILY)  CAPS, , Disp: , Rfl:      topiramate (TOPAMAX) 25 MG tablet, TAKE 1 TABLET IN THE MORNING  AND 3 TABLETS IN THE EVENING, Disp: 360 tablet, Rfl: 1     traMADol (ULTRAM) 50 MG tablet, TAKE 1 TABLET BY MOUTH IN THE MORNING AND 1 IN THE EVENING ON  TUESDAY,  THURSDAY  AND  SATURDAY, Disp: 43 tablet, Rfl: 0     traZODone (DESYREL) 50 MG tablet, TAKE 1 TABLET (50 MG) BY MOUTH AT BEDTIME, Disp: 90 tablet, Rfl: 3     Triamcinolone Acetonide (NASACORT ALLERGY 24HR NA), , Disp: , Rfl:      TUMS 500 OR, 2 tabs q pm, Disp: , Rfl:      UNABLE TO FIND, Take 500 mg by mouth daily MEDICATION NAME: L Carnitine, Disp: , Rfl:      UNABLE TO FIND, Take 1 capsule by mouth At Bedtime MEDICATION NAME: Tumeric Curcuman, Disp: , Rfl:      VITAMIN D 1000 UNIT OR TABS, 1 daily, Disp: , Rfl:     Current Facility-Administered Medications:      betamethasone acet & sod phos (CELESTONE) injection 6 mg, 6 mg, Intramuscular, Once, Lamberto Pineda MD     hydrocortisone sodium succinate PF (solu-CORTEF) injection 50 mg, 50 mg, Intravenous, Once, Lamberto Pineda MD     hydrocortisone sodium succinate PF (solu-CORTEF) injection 50 mg, 50 mg, Intravenous, Once, Lamberto Pineda MD     methylPREDNISolone sodium succinate (solu-MEDROL) injection 40 mg, 40 mg, Intravenous, Once, Lamberto Pineda MD     ALLERGIES:    Allergies   Allergen Reactions     Cortisone Hives     Prick and intradermal tests with various steroids negatives, including Hydrocortisone succinate, methylprednisolone succinate, Triamcinolone, Dexamethasone. If these steroids are necessary, they can be given. Premedicate patient with antihistamines and keep an eye on patient for 1h        SOCIAL HISTORY:   Social History     Socioeconomic History     Marital status:      Spouse name: Not on file     Number of children: Not on file     Years of education: Not on file     Highest education level: Not on file   Occupational History     Not on file   Tobacco Use     Smoking  status: Former Smoker     Quit date: 10/1/1981     Years since quittin.0     Smokeless tobacco: Never Used   Vaping Use     Vaping Use: Never used   Substance and Sexual Activity     Alcohol use: Not Currently     Comment: social     Drug use: No     Sexual activity: Not Currently     Partners: Male   Other Topics Concern     Parent/sibling w/ CABG, MI or angioplasty before 65F 55M? Not Asked   Social History Narrative     Not on file     Social Determinants of Health     Financial Resource Strain: Not on file   Food Insecurity: Not on file   Transportation Needs: Not on file   Physical Activity: Not on file   Stress: Not on file   Social Connections: Not on file   Intimate Partner Violence: Not on file   Housing Stability: Not on file        FAMILY HISTORY:   Family History   Problem Relation Age of Onset     C.A.D. Father           73 yo MI     Family History Negative Mother         born 191     Respiratory Brother          70 yo Emphysema (smoker)     Diabetes Brother      C.A.D. Brother         pacemaker     Blood Disease Sister         Factor 2 - blood clots,allergies     Family History Negative Son      Family History Negative Son      Family History Negative Son         EXAM:Vitals: LMP  (LMP Unknown)   BMI= There is no height or weight on file to calculate BMI.      General appearance: Patient is alert and fully cooperative with history & exam.  No sign of distress is noted during the visit.     Psychiatric: Affect is pleasant & appropriate.  Patient appears motivated to improve health.     Respiratory: Breathing is regular & unlabored while sitting.     HEENT: Hearing is intact to spoken word.  Speech is clear.  No gross evidence of visual impairment that would impact ambulation.     Dermatologic: Localized hyperkeratotic lesion to the plantar aspect of the left first metatarsal head.  No open lesions or signs of infection noted.  Nails 1 3 and 5 bilateral are darkened, dystrophic and  present with subungual debris.     Vascular: DP & PT pulses are intact & regular bilaterally.  No significant edema or varicosities noted.  CFT and skin temperature is normal to both lower extremities.     Neurologic: Lower extremity sensation is intact to light touch.  No evidence of weakness or contracture in the lower extremities.  No evidence of neuropathy.     Musculoskeletal: Patient is ambulatory without assistive device or brace.  Prominent metatarsal heads plantarly of the.     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 Discussed causes and treatments of nail fungus.  Explained that even if a culture comes back negative, a patient could still have nail fungus.  Discussed treatment options with patient and explained that there isn't one treatment that is 100% effective.  Discussed oral lamisil which is the most effective at about 70% but which can have liver effects.  Explained that if she wanted to try this that she would need serial blood draws to test her liver function.  Discussed over the counter antifungal creams.  Explained that these are about 50% effective and need to be applied once a day for about 6-8months.  Also talked about prescription penlac which is a nail laquer.  Again this is also only 50% effective.  Also discussed that if there was damage to the nail and the nail is now dystrophic that non of the above is going to change the nail.  If there was damage, there is note anything that can be done for the nail to correct it.  Discussed that if it becomes painful, we can remove the nail in clinic.        Patient was given an order for antifungal cream.  She will apply this daily for the next 6 to 12 months.  She will follow.    Discussed causes of keratomas.  They are due to areas of increase friction.  Hammertoes can create these as they put more pressure to the  metatarsal head.  Discussed treatments such as using foot file, pumice stone, metatarsal pads, orthotics, and not walking barefoot.     We discussed the cost structure of callus care if they were to come back and have it treated in the clinic if insurance does not cover it and explained that they would be billed. They were also provided information on places to get the callus treatment.    Recommend using a pumice stone once or twice a week and motioning daily.  She was given an order for this.  All questions were answered to patient's satisfaction and she will call for questions or concerns.    Patient risk factor: Patient is at low risk for infection.        Estefany Paniagua DPM, Podiatry/Foot and Ankle Surgery        Again, thank you for allowing me to participate in the care of your patient.        Sincerely,        Estefany Paniagua DPM, Podiatry/Foot and Ankle Surgery

## 2022-10-10 ENCOUNTER — TRANSFERRED RECORDS (OUTPATIENT)
Dept: HEALTH INFORMATION MANAGEMENT | Facility: CLINIC | Age: 82
End: 2022-10-10

## 2022-10-11 ENCOUNTER — TELEPHONE (OUTPATIENT)
Dept: ORTHOPEDICS | Facility: CLINIC | Age: 82
End: 2022-10-11

## 2022-10-11 NOTE — TELEPHONE ENCOUNTER
Spoke with patient.  Doing well, no questions at this time.  Offered number for nurse triage and confirmed follow up appointment.    Recommend RICE for swelling.    Josiah Mccabe PA-C  [facility] - Surgery

## 2022-10-13 ENCOUNTER — TELEPHONE (OUTPATIENT)
Dept: ORTHOPEDICS | Facility: CLINIC | Age: 82
End: 2022-10-13

## 2022-10-13 DIAGNOSIS — G89.18 ACUTE POST-OPERATIVE PAIN: Primary | ICD-10-CM

## 2022-10-13 RX ORDER — OXYCODONE HYDROCHLORIDE 5 MG/1
5 TABLET ORAL EVERY 4 HOURS PRN
Qty: 20 TABLET | Refills: 0 | Status: SHIPPED | OUTPATIENT
Start: 2022-10-13 | End: 2022-10-16

## 2022-10-13 NOTE — TELEPHONE ENCOUNTER
Pt presented to  with pain.    Post op RIght thumb CMC jt arthroplasty, DOS 1010/2022, Dr. Saeed.    States having a lot of pain at thumb and hand.  Is elevating in sling with extreme elbow bend.  advil 400 mg BID, Norco q6 hours with no relief.  Icing at elbow.    Exam:  Fingers swollen but warm, sensation intact, good color, gross movement intact.  Splint on and without skin breakdown.    We loosened the ace and flexed the plaster.  Recommend elevation without elbow bend.  Switch to Oxycodone for pain.  Recommend Ibuprofen 400 mg Q 6 hrs.    Confirmed follow up.    Josiah Mccabe PA-C  [facility] - Surgery

## 2022-10-13 NOTE — TELEPHONE ENCOUNTER
Patient walked into clinic asking to speak to a nurse.   She was in the area for a Dermatology appointment.   She had R CMC Arthroplasty done on 11/10/22 by Dr. Saeed.   She is currently wearing a sling and her hand is above heart level.   She reports that the Hydrocodone/APAP 5-325mg is not helping with her pain.   She states she has tried taking 2 tabs at a time and does not really notice a change in pain. She is currently rating her pain as +9/10. Her last dose of Hydrocodone/APAP was 1 tab at 6:30 am and is due for another dose. She has 1 tablet left only.   She is also taking Advil 400mg twice daily. '  She is icing behind the elbow hourly for 20 minutes at a time and elevating her arm constantly.   Her fingers are swollen but she is able to bend them and has been doing the finger exercises. Block wore off on 10/11 early am. Fingers are warm and she denies any numbness or tingling. She reports normal sensation of her fingers.     Of note, she states if she takes Tylenol for more than a week, it will raise her LFT's. She denies any kidney problems.     Patient uses [facility] in [place].     Alok Mccabe PA-C saw patient.     AGUSTO Medina RN

## 2022-10-15 ENCOUNTER — HEALTH MAINTENANCE LETTER (OUTPATIENT)
Age: 82
End: 2022-10-15

## 2022-10-24 ENCOUNTER — OFFICE VISIT (OUTPATIENT)
Dept: ORTHOPEDICS | Facility: CLINIC | Age: 82
End: 2022-10-24
Payer: MEDICARE

## 2022-10-24 ENCOUNTER — THERAPY VISIT (OUTPATIENT)
Dept: OCCUPATIONAL THERAPY | Facility: CLINIC | Age: 82
End: 2022-10-24
Attending: ORTHOPAEDIC SURGERY
Payer: MEDICARE

## 2022-10-24 DIAGNOSIS — M18.11 ARTHRITIS OF CARPOMETACARPAL (CMC) JOINT OF RIGHT THUMB: ICD-10-CM

## 2022-10-24 DIAGNOSIS — M79.644 PAIN OF RIGHT THUMB: ICD-10-CM

## 2022-10-24 DIAGNOSIS — Z47.89 AFTERCARE FOLLOWING SURGERY OF THE MUSCULOSKELETAL SYSTEM: ICD-10-CM

## 2022-10-24 DIAGNOSIS — Z47.89 ORTHOPEDIC AFTERCARE: Primary | ICD-10-CM

## 2022-10-24 PROCEDURE — 97760 ORTHOTIC MGMT&TRAING 1ST ENC: CPT | Mod: GO | Performed by: OCCUPATIONAL THERAPIST

## 2022-10-24 PROCEDURE — 99024 POSTOP FOLLOW-UP VISIT: CPT | Performed by: PHYSICIAN ASSISTANT

## 2022-10-24 PROCEDURE — 97165 OT EVAL LOW COMPLEX 30 MIN: CPT | Mod: GO | Performed by: OCCUPATIONAL THERAPIST

## 2022-10-24 PROCEDURE — 97110 THERAPEUTIC EXERCISES: CPT | Mod: GO | Performed by: OCCUPATIONAL THERAPIST

## 2022-10-24 NOTE — PROGRESS NOTES
HISTORY OF PRESENT ILLNESS:    Nubia Coronel is a 81 year old female who is seen in follow up for Right thumb CMC jt arthroplasty with anchor, DOS 10/10/2022, Dr. Saeed.  Present symptoms: Pain improved after last visit.  Keeping splint on and dry.  No medications since Saturday a week ago.  Swelling minimal.  Hand therapy to follow appt.  Denies Chest pain, Calve pain, Fever, Chills.    Current Treatment: Post op, hand therapy ,bracing.    PHYSICAL EXAM:  LMP  (LMP Unknown)   There is no height or weight on file to calculate BMI.   GENERAL APPEARANCE: healthy, alert and no distress   PSYCH:  mentation appears normal and affect normal/bright    MSK:  Right:   Hand and thumb .  Presents in well maintained splint.  Incisions clean and dry, sutures present, healing.  Appropriate incisional erythema.   Ecchymosis at thumb resolving.  Edema min to moderate at base of thumb and wrist.  CMS: darinel incisional numbness, otherwise grossly intact.  AROM wrist grossly symmetric, thumb very limited.      IMAGING INTERPRETATION:  None today.     ASSESSMENT:  Nubia Coronel is a 81 year old female S/P Right thumb CMC jt arthroplasty with anchor, DOS 10/10/2022, Dr. Saeed.    Healing incision.    PLAN:  - Surgery discussed, images reviewed if applicable, and all questions were answered at this time.  - sutures removed with sterile technique, steri-strips applied in usual fashion, care instructions given and verbally acknowledged.  - Medications: as current.  - Physical therapy: new referral to start today for bracing and therapy.  - limit lifting to 1 lb.    Return to clinic 6, weeks    Josiah Mccabe PA-C    Dept. Orthopedic Surgery  Health system   10/24/2022

## 2022-10-24 NOTE — PROGRESS NOTES
Hand Therapy Initial Evaluation  Current Date:  10/24/2022    Subjective:  Nubia Coronel is a 81 year old right hand dominant female.    Diagnosis: R CMC arthritis  DOI:  10 years  DOS:  10/10/22  Procedure: CMC Arthroplasty    Patient reports symptoms of pain, stiffness/loss of motion, weakness/loss of strength and edema of the right thumb which occurred due to gradual onset. Since onset symptoms are gradually getting better. Special tests:  x-ray.  Previous treatment: surgery. General health as reported by patient is good.  Pertinent medical history includes: Fibromyalgia, Osteoarthritis.  Medical allergies: none.  Surgical history: other: Sinus, hysterectomy, gall bladder, appendectomy.  Medication history: see chart.    Occupational Profile Information:  Current occupation is retired teacher  Prior functional level:  independent-shared household chores  Barriers include:none  Mobility: No difficulty  Transportation: drives  Leisure activities/hobbies: cooking, computer work, household use    Upper Extremity Functional Index Score:  SCORE:   Column Totals: /80: 22   (A lower score indicates greater disability.)    Objective:  Pain Level (Scale 0-10):   10/24/22   At Rest 3/10   With Use 8/10     Pain Description:  Date 10/24/2022   Location R thumb CMC   Pain Quality Dull and Shooting   Frequency constant     Pain is worst  daytime or nighttime   Exacerbated by  occurs randomly, rotation of forearm   Relieved by rest   Progression Gradually improving      Edema  Mild    Scar    Stitches removed today, steri-strips present    Sensation  WNL throughout all nerve distributions; per patient report    ROM  Pain Report: 0-10/10  Thumb 10/24/2022 10/24/2022   AROM (PROM) L R   MP -8/42 -5/27   IP +/44 0/24   RABD 37 28   PABD 41 34     Wrist 10/24/2022 10/24/2022   AROM (PROM) L R   Extension 68 38   Flexion 75 38   RD 14 0   UD 38 21     Strength: contraindicated    Assessment:  Patient presents with symptoms  consistent with diagnosis of right thumb CMC Arthritis, with surgical  intervention.     Patient's limitations or Problem List includes:  Pain, Decreased ROM/motion, Increased edema, Weakness, Decreased , Decreased pinch, Decreased coordination, Decreased dexterity and Adherence in connective tissue of the right thumb which interferes with the patient's ability to perform Self Care Tasks (dressing, eating, bathing, hygiene/toileting), Sleep Patterns, Recreational Activities, Household Chores and Driving  as compared to previous level of function.    Rehab Potential:  Excellent - Return to full activity, no limitations    Patient will benefit from skilled Occupational Therapy to increase ROM, flexibility,  strength, pinch strength, stability of thumb, coordination and dexterity and decrease pain, edema and adherence of scarring to return to previous activity level and resume normal daily tasks and to reach their rehab potential.    Barriers to Learning:  No barrier    Communication Issues:  Patient appears to be able to clearly communicate and understand verbal and written communication and follow directions correctly.    Chart Review: Chart Review, Brief history including review of medical and/or therapy records relating to the presenting problem and Simple history review with patient    Identified Performance Deficits: bathing/showering, toileting, dressing, feeding, driving and community mobility, health management and maintenance, home establishment and management, meal preparation and cleanup, shopping, sleep and leisure activities    Assessment of Occupational Performance:  5 or more Performance Deficits    Clinical Decision Making (Complexity): Low complexity    Treatment Explanation:  The following has been discussed with the patient:  RX ordered/plan of care  Anticipated outcomes  Possible risks and side effects    Plan:  Frequency:  1 X week, once daily  Duration:  for 8 weeks    Treatment Plan:     Modalities:    US and Paraffin   Therapeutic Exercise:    AROM, PROM, Tendon Gliding, Blocking, Place and Hold, Isotonics and Isometrics  Therapeutic Activities:   Functional activities   Neuromuscular re-ed:   Nerve Gliding, Sensory re-education, Desensitization, Kinesthetic Training, Proprioceptive Training and Isometrics  Manual Techniques:   Coordination/Dexterity, Joint mobilization and Myofascial release  Orthotic Fabrication:    Static and Forearm based  Self Care:    Self Care Tasks and Ergonomic Considerations    Discharge Plan:    Achieve all LTG.  Independent in home treatment program.  Reach maximal therapeutic benefit.    Home Program:   Wrist Active Range of Motion Extension and Flexion Combined  Wrist Active Range of Motion Circumduction  Thumb Active Range of Motion Composite Flexion  Thumb Active Range of Motion Circumduction  Orthosis Wear    Next Visit:  Check fit of orthosis and adjust PRN  Scar mobilization  Progress ROM

## 2022-10-24 NOTE — PATIENT INSTRUCTIONS
Incision Care:  Sutures were removed and Steri-Strips applied in usual fashion.  Keep dry 24-48 hours.  Showering ok after that time, however no soaking or scrubbing of incision for 1 weeks.  Steri-strips will most likely fall off on their own, however they may be removed after 1 weeks with rubbing alcohol if they have not.    If draining or bleeding stops the tape-strips are enough coverage unless you were instructed otherwise or you would like to cover for comfort.  If drainage or bleeding continues please cover with clean dressings.     Therapy will help guide you on activities, and limit lifting to 1 lb.    Brace to be worn when up and moving, may remove when seated for hygiene and exercises.    Scar tissue may develop and be present at or deep to the incision site for several weeks to months which may feel like a lump. Gentle massage to the area when tolerated may help reduce this.   Also the top layers of skin may peel away over the next weeks.    Follow up in 6 weeks in clinic, or as needed..

## 2022-10-26 PROBLEM — M79.644 PAIN OF RIGHT THUMB: Status: ACTIVE | Noted: 2022-10-26

## 2022-10-26 PROBLEM — M18.11 ARTHRITIS OF CARPOMETACARPAL (CMC) JOINT OF RIGHT THUMB: Status: ACTIVE | Noted: 2022-10-26

## 2022-10-26 PROBLEM — Z47.89 AFTERCARE FOLLOWING SURGERY OF THE MUSCULOSKELETAL SYSTEM: Status: ACTIVE | Noted: 2022-10-26

## 2022-10-26 NOTE — PROGRESS NOTES
[facility]    OUTPATIENT Occupational Therapy ORTHOPEDIC EVALUATION  PLAN OF TREATMENT FOR OUTPATIENT REHABILITATION  (COMPLETE FOR INITIAL CLAIMS ONLY)  Patient's Last Name, First Name, M.I.  YOB: 1940  HomerNubia Jose    Provider s Name:  [facility]   Medical Record No.  [id number]   Start of Care Date:  10/24/22   Onset Date:  10/10/22 (pt reports 10 years ago)   Treatment Diagnosis:  R CMC Arthroplasty Medical Diagnosis:  Arthritis of carpometacarpal (CMC) joint of right thumb       Goals:     10/24/22 0500   Goal #1   Goal #1 household chores   Previous Performance Level Independent   Current Functional Task    Current Performance Level pain 8/10   STG Target Perfomance Open a tight or new jar   STG Target Perform Level Pain 7/10   Due Date 11/23/22   LTG Target Task/Performance Pain free household chores   Due Date 12/21/22       Therapy Frequency:  1x/week  Predicted Duration of Therapy Intervention:  8 weeks    Jennifer Mishra OT                 I CERTIFY THE NEED FOR THESE SERVICES FURNISHED UNDER        THIS PLAN OF TREATMENT AND WHILE UNDER MY CARE     (Physician attestation of this document indicates review and certification of the therapy plan).                     Certification Date From:  10/24/22   Certification Date To:  12/21/22    Referring Provider:  Jaxon Saeed    Initial Assessment        See Epic Evaluation SOC Date: 10/24/22

## 2022-11-02 ENCOUNTER — THERAPY VISIT (OUTPATIENT)
Dept: OCCUPATIONAL THERAPY | Facility: CLINIC | Age: 82
End: 2022-11-02
Payer: MEDICARE

## 2022-11-02 DIAGNOSIS — M79.644 PAIN OF RIGHT THUMB: Primary | ICD-10-CM

## 2022-11-02 DIAGNOSIS — Z47.89 AFTERCARE FOLLOWING SURGERY OF THE MUSCULOSKELETAL SYSTEM: ICD-10-CM

## 2022-11-02 DIAGNOSIS — M18.11 ARTHRITIS OF CARPOMETACARPAL (CMC) JOINT OF RIGHT THUMB: ICD-10-CM

## 2022-11-02 PROCEDURE — 97763 ORTHC/PROSTC MGMT SBSQ ENC: CPT | Mod: GO | Performed by: OCCUPATIONAL THERAPIST

## 2022-11-02 PROCEDURE — 97140 MANUAL THERAPY 1/> REGIONS: CPT | Mod: GO | Performed by: OCCUPATIONAL THERAPIST

## 2022-11-02 PROCEDURE — 97110 THERAPEUTIC EXERCISES: CPT | Mod: GO | Performed by: OCCUPATIONAL THERAPIST

## 2022-11-02 NOTE — PROGRESS NOTES
Hand Therapy SOAP note  Current Date:  11/2/2022    Subjective:  Nubia Coronel is a 81 year old right hand dominant female.    Diagnosis: R CMC arthritis  DOI:  10 years  DOS:  10/10/22  Procedure: CMC Arthroplasty    Patient reports symptoms of pain, stiffness/loss of motion, weakness/loss of strength and edema of the right thumb which occurred due to gradual onset. Since onset symptoms are gradually getting better. Special tests:  x-ray.  Previous treatment: surgery. General health as reported by patient is good.  Pertinent medical history includes: Fibromyalgia, Osteoarthritis.  Medical allergies: none.  Surgical history: other: Sinus, hysterectomy, gall bladder, appendectomy.  Medication history: see chart.    Occupational Profile Information:  Current occupation is retired teacher  Prior functional level:  independent-shared household chores  Barriers include:none  Mobility: No difficulty  Transportation: drives  Leisure activities/hobbies: cooking, computer work, household use    Upper Extremity Functional Index Score:  SCORE:   Column Totals: /80: 22   (A lower score indicates greater disability.)    Objective:  Pain Level (Scale 0-10):   10/24/22 11/2/2022   At Rest 3/10 3/10 smarts   With Use 8/10 5/10 with exercise      Pain Description:  Date 10/24/2022   Location R thumb CMC   Pain Quality Dull and Shooting   Frequency constant     Pain is worst  daytime or nighttime   Exacerbated by  occurs randomly, rotation of forearm   Relieved by rest   Progression Gradually improving      Edema  Mild    Scar    Stitches removed today, steri-strips present    Sensation  WNL throughout all nerve distributions; per patient report    ROM  Pain Report: 0-10/10  Thumb 10/24/2022 10/24/2022 11/2   AROM (PROM) L R    MP -8/42 -5/27 45   IP +/44 0/24 45   RABD 37 28 40    PABD 41 34 42     Wrist 10/24/2022 10/24/2022 11/2   AROM (PROM) L R R   Extension 68 38 45   Flexion 75 38 45   RD 14 0    UD 38 21       Strength: contraindicated    Assessment:  Patient presents with symptoms consistent with diagnosis of right thumb CMC Arthritis, with surgical  intervention.     Patient's limitations or Problem List includes:  Pain, Decreased ROM/motion, Increased edema, Weakness, Decreased , Decreased pinch, Decreased coordination, Decreased dexterity and Adherence in connective tissue of the right thumb which interferes with the patient's ability to perform Self Care Tasks (dressing, eating, bathing, hygiene/toileting), Sleep Patterns, Recreational Activities, Household Chores and Driving  as compared to previous level of function.    Rehab Potential:  Excellent - Return to full activity, no limitations    Patient will benefit from skilled Occupational Therapy to increase ROM, flexibility,  strength, pinch strength, stability of thumb, coordination and dexterity and decrease pain, edema and adherence of scarring to return to previous activity level and resume normal daily tasks and to reach their rehab potential.    Barriers to Learning:  No barrier    Communication Issues:  Patient appears to be able to clearly communicate and understand verbal and written communication and follow directions correctly.    Chart Review: Chart Review, Brief history including review of medical and/or therapy records relating to the presenting problem and Simple history review with patient    Identified Performance Deficits: bathing/showering, toileting, dressing, feeding, driving and community mobility, health management and maintenance, home establishment and management, meal preparation and cleanup, shopping, sleep and leisure activities    Assessment of Occupational Performance:  5 or more Performance Deficits    Clinical Decision Making (Complexity): Low complexity    Treatment Explanation:  The following has been discussed with the patient:  RX ordered/plan of care  Anticipated outcomes  Possible risks and side  effects    Plan:  Frequency:  1 X week, once daily  Duration:  for 8 weeks    Treatment Plan:    Modalities:    US and Paraffin   Therapeutic Exercise:    AROM, PROM, Tendon Gliding, Blocking, Place and Hold, Isotonics and Isometrics  Therapeutic Activities:   Functional activities   Neuromuscular re-ed:   Nerve Gliding, Sensory re-education, Desensitization, Kinesthetic Training, Proprioceptive Training and Isometrics  Manual Techniques:   Coordination/Dexterity, Joint mobilization and Myofascial release  Orthotic Fabrication:    Static and Forearm based  Self Care:    Self Care Tasks and Ergonomic Considerations    Discharge Plan:    Achieve all LTG.  Independent in home treatment program.  Reach maximal therapeutic benefit.    Home Program:   Wrist Active Range of Motion Extension and Flexion Combined  Wrist Active Range of Motion Circumduction  Thumb Active Range of Motion Composite Flexion  Thumb Active Range of Motion Circumduction  Orthosis Wear    Next Visit:  Check fit of orthosis and adjust to hand based   Initiate Scar mobilization  Progress ROM

## 2022-11-07 NOTE — PROGRESS NOTES
SOAP note objective information for 11/8/2022.    Diagnosis: R CMC arthritis  DOI:  10 years  DOS:  10/10/22  Procedure: CMC Arthroplasty    Please refer to the daily flowsheet for treatment today, total treatment time and time spent performing 1:1 timed codes.       Objective:  Pain Level (Scale 0-10):   10/24/22 11/2/2022 11/8/2022   At Rest 3/10 3/10 smarts 0-1   With Use 8/10 5/10 with exercise  2-3     Pain Description:  Date 10/24/2022 11/8/2022   Location R thumb CMC    Pain Quality Dull and Shooting    Frequency constant      Pain is worst  daytime or nighttime    Exacerbated by  occurs randomly, rotation of forearm    Relieved by rest    Progression Gradually improving improving      Edema  Mild    Scar    Stitches removed today, steri-strips present    Sensation  WNL throughout all nerve distributions; per patient report    ROM  Pain Report: 0-10/10  Thumb 10/24/2022 10/24/2022 11/2 11/8/2022   AROM (PROM) L R  R   MP -8/42 -5/27 45 35 pre  40 post   IP +/44 0/24 45 66   RABD 37 28 40     PABD 41 34 42      Wrist 10/24/2022 10/24/2022 11/2 11/8/2022   AROM (PROM) L R R R   Extension 68 38 45 58   Flexion 75 38 45 58   RD 14 0     UD 38 21       Strength: contraindicated    Treatment Plan:    Modalities:    US and Paraffin   Therapeutic Exercise:    AROM, PROM, Tendon Gliding, Blocking, Place and Hold, Isotonics and Isometrics  Therapeutic Activities:   Functional activities   Neuromuscular re-ed:   Nerve Gliding, Sensory re-education, Desensitization, Kinesthetic Training, Proprioceptive Training and Isometrics  Manual Techniques:   Coordination/Dexterity, Joint mobilization and Myofascial release  Orthotic Fabrication:    Static and Forearm based  Self Care:    Self Care Tasks and Ergonomic Considerations    Discharge Plan:    Achieve all LTG.  Independent in home treatment program.  Reach maximal therapeutic benefit.    Home Program:   Wrist Active Range of Motion Extension and Flexion Combined  Wrist  Active Range of Motion Circumduction  Thumb Active Range of Motion Composite Flexion  Thumb Active Range of Motion Circumduction  Orthosis Wear    11/8/2022  Exercise Name: Education Sheet General, EMR Notes: scar pad at night and 1/2 day  Exercise Name: Scar Mobilization - Circular - Sessions: 2-3, EMR Notes: 2-3 minutes    Next Visit:  Check fit of orthosis and adjust to hand based   Initiate Scar mobilization  Progress ROM

## 2022-11-08 ENCOUNTER — THERAPY VISIT (OUTPATIENT)
Dept: OCCUPATIONAL THERAPY | Facility: CLINIC | Age: 82
End: 2022-11-08
Payer: MEDICARE

## 2022-11-08 DIAGNOSIS — Z47.89 AFTERCARE FOLLOWING SURGERY OF THE MUSCULOSKELETAL SYSTEM: ICD-10-CM

## 2022-11-08 DIAGNOSIS — M79.644 PAIN OF RIGHT THUMB: Primary | ICD-10-CM

## 2022-11-08 DIAGNOSIS — M18.11 ARTHRITIS OF CARPOMETACARPAL (CMC) JOINT OF RIGHT THUMB: ICD-10-CM

## 2022-11-08 PROCEDURE — 97110 THERAPEUTIC EXERCISES: CPT | Mod: GO | Performed by: OCCUPATIONAL THERAPIST

## 2022-11-08 PROCEDURE — 97140 MANUAL THERAPY 1/> REGIONS: CPT | Mod: GO | Performed by: OCCUPATIONAL THERAPIST

## 2022-11-16 ENCOUNTER — THERAPY VISIT (OUTPATIENT)
Dept: OCCUPATIONAL THERAPY | Facility: CLINIC | Age: 82
End: 2022-11-16
Payer: MEDICARE

## 2022-11-16 DIAGNOSIS — M79.644 PAIN OF RIGHT THUMB: Primary | ICD-10-CM

## 2022-11-16 DIAGNOSIS — M18.11 ARTHRITIS OF CARPOMETACARPAL (CMC) JOINT OF RIGHT THUMB: ICD-10-CM

## 2022-11-16 DIAGNOSIS — Z47.89 AFTERCARE FOLLOWING SURGERY OF THE MUSCULOSKELETAL SYSTEM: ICD-10-CM

## 2022-11-16 PROCEDURE — 97110 THERAPEUTIC EXERCISES: CPT | Mod: GO | Performed by: OCCUPATIONAL THERAPIST

## 2022-11-16 PROCEDURE — 97140 MANUAL THERAPY 1/> REGIONS: CPT | Mod: GO | Performed by: OCCUPATIONAL THERAPIST

## 2022-11-16 NOTE — PROGRESS NOTES
SOAP note objective information for 11/16/2022.    Diagnosis: R CMC arthritis  DOI:  10 years  DOS:  10/10/22  Procedure: CMC Arthroplasty    Objective:  ROM  Pain Report: 0-10/10  Thumb 10/24/2022 10/24/2022 11/2 11/8/2022 11/16   AROM (PROM) L R  R R   MP -8/42 -5/27 45 35 pre  40 post 35   IP +/44 0/24 45 66 53   RABD 37 28 40   NT   PABD 41 34 42  NT     Wrist 10/24/2022 10/24/2022 11/2 11/8/2022 11/16/22   AROM (PROM) L R R R R   Extension 68 38 45 58 55   Flexion 75 38 45 58 54   RD 14 0   NT   UD 38 21   NT   Please refer to the daily flowsheet for treatment today, total treatment time and time spent performing 1:1 timed codes.

## 2022-11-22 ENCOUNTER — THERAPY VISIT (OUTPATIENT)
Dept: OCCUPATIONAL THERAPY | Facility: CLINIC | Age: 82
End: 2022-11-22
Payer: MEDICARE

## 2022-11-22 DIAGNOSIS — M18.11 ARTHRITIS OF CARPOMETACARPAL (CMC) JOINT OF RIGHT THUMB: ICD-10-CM

## 2022-11-22 DIAGNOSIS — Z47.89 AFTERCARE FOLLOWING SURGERY OF THE MUSCULOSKELETAL SYSTEM: ICD-10-CM

## 2022-11-22 DIAGNOSIS — M79.644 PAIN OF RIGHT THUMB: Primary | ICD-10-CM

## 2022-11-22 PROCEDURE — 97110 THERAPEUTIC EXERCISES: CPT | Mod: GO | Performed by: OCCUPATIONAL THERAPIST

## 2022-11-22 PROCEDURE — 97140 MANUAL THERAPY 1/> REGIONS: CPT | Mod: GO | Performed by: OCCUPATIONAL THERAPIST

## 2022-11-22 NOTE — PROGRESS NOTES
SOAP note objective information for 11/22/2022.    Diagnosis: R CMC arthritis  DOI:  10 years  DOS:  10/10/22  Procedure: CMC Arthroplasty    Objective:  ROM  Pain Report: 0-10/10  Thumb 10/24/2022 10/24/2022 11/2 11/8/2022 11/16 11/22/22   AROM (PROM) L R  R R R   MP -8/42 -5/27 45 35 pre  40 post 35 0/36   IP +/44 0/24 45 66 53 +/30   RABD 37 28 40   NT NT   PABD 41 34 42  NT NT     Wrist 10/24/2022 10/24/2022 11/2 11/8/2022 11/16/22 11/22/22   AROM (PROM) L R R R R R   Extension 68 38 45 58 55 60   Flexion 75 38 45 58 54 64   RD 14 0   NT 12   UD 38 21   NT 27   Please refer to the daily flowsheet for treatment today, total treatment time and time spent performing 1:1 timed codes.

## 2022-11-30 ENCOUNTER — THERAPY VISIT (OUTPATIENT)
Dept: OCCUPATIONAL THERAPY | Facility: CLINIC | Age: 82
End: 2022-11-30
Payer: MEDICARE

## 2022-11-30 DIAGNOSIS — Z47.89 AFTERCARE FOLLOWING SURGERY OF THE MUSCULOSKELETAL SYSTEM: ICD-10-CM

## 2022-11-30 DIAGNOSIS — M18.11 ARTHRITIS OF CARPOMETACARPAL (CMC) JOINT OF RIGHT THUMB: ICD-10-CM

## 2022-11-30 DIAGNOSIS — M79.644 PAIN OF RIGHT THUMB: Primary | ICD-10-CM

## 2022-11-30 PROCEDURE — 97140 MANUAL THERAPY 1/> REGIONS: CPT | Mod: GO | Performed by: OCCUPATIONAL THERAPIST

## 2022-11-30 PROCEDURE — 97110 THERAPEUTIC EXERCISES: CPT | Mod: GO | Performed by: OCCUPATIONAL THERAPIST

## 2022-11-30 PROCEDURE — 97018 PARAFFIN BATH THERAPY: CPT | Mod: GO | Performed by: OCCUPATIONAL THERAPIST

## 2022-11-30 NOTE — PROGRESS NOTES
SOAP note objective information for 11/22/2022.    Diagnosis: R CMC arthritis  DOI:  10 years  DOS:  10/10/22  Procedure: CMC Arthroplasty    Objective:  ROM  Pain Report: 0-10/10  Thumb 10/24/2022 10/24/2022 11/2 11/8/2022 11/16 11/22/22 11/30/2022   AROM (PROM) L R  R R R R   MP -8/42 -5/27 45 35 pre  40 post 35 0/36 35   IP +/44 0/24 45 66 53 +/30 60   RABD 37 28 40   NT NT    PABD 41 34 42  NT NT      Wrist 10/24/2022 10/24/2022 11/2 11/8/2022 11/16/22 11/22/22 11/30/2022   AROM (PROM) L R R R R R R   Extension 68 38 45 58 55 60 60   Flexion 75 38 45 58 54 64 60   RD 14 0   NT 12    UD 38 21   NT 27    Please refer to the daily flowsheet for treatment today, total treatment time and time spent performing 1:1 timed codes.     
Wounds

## 2022-12-01 DIAGNOSIS — M79.7 FIBROMYALGIA: ICD-10-CM

## 2022-12-01 RX ORDER — TRAMADOL HYDROCHLORIDE 50 MG/1
TABLET ORAL
Qty: 43 TABLET | Refills: 0 | Status: SHIPPED | OUTPATIENT
Start: 2022-12-01 | End: 2022-12-20

## 2022-12-05 ENCOUNTER — THERAPY VISIT (OUTPATIENT)
Dept: OCCUPATIONAL THERAPY | Facility: CLINIC | Age: 82
End: 2022-12-05
Payer: MEDICARE

## 2022-12-05 DIAGNOSIS — M18.11 ARTHRITIS OF CARPOMETACARPAL (CMC) JOINT OF RIGHT THUMB: ICD-10-CM

## 2022-12-05 DIAGNOSIS — M79.644 PAIN OF RIGHT THUMB: Primary | ICD-10-CM

## 2022-12-05 DIAGNOSIS — Z47.89 AFTERCARE FOLLOWING SURGERY OF THE MUSCULOSKELETAL SYSTEM: ICD-10-CM

## 2022-12-05 PROCEDURE — 97140 MANUAL THERAPY 1/> REGIONS: CPT | Mod: GO | Performed by: OCCUPATIONAL THERAPIST

## 2022-12-05 PROCEDURE — 97110 THERAPEUTIC EXERCISES: CPT | Mod: GO | Performed by: OCCUPATIONAL THERAPIST

## 2022-12-12 DIAGNOSIS — R60.0 BILATERAL EDEMA OF LOWER EXTREMITY: ICD-10-CM

## 2022-12-13 ENCOUNTER — THERAPY VISIT (OUTPATIENT)
Dept: OCCUPATIONAL THERAPY | Facility: CLINIC | Age: 82
End: 2022-12-13
Payer: MEDICARE

## 2022-12-13 DIAGNOSIS — M79.644 PAIN OF RIGHT THUMB: Primary | ICD-10-CM

## 2022-12-13 DIAGNOSIS — Z47.89 AFTERCARE FOLLOWING SURGERY OF THE MUSCULOSKELETAL SYSTEM: ICD-10-CM

## 2022-12-13 DIAGNOSIS — M18.11 ARTHRITIS OF CARPOMETACARPAL (CMC) JOINT OF RIGHT THUMB: ICD-10-CM

## 2022-12-13 PROCEDURE — 97110 THERAPEUTIC EXERCISES: CPT | Mod: GO | Performed by: OCCUPATIONAL THERAPIST

## 2022-12-13 PROCEDURE — 97140 MANUAL THERAPY 1/> REGIONS: CPT | Mod: GO | Performed by: OCCUPATIONAL THERAPIST

## 2022-12-13 NOTE — PROGRESS NOTES
Hand Therapy Progress Note  Current Date:  12/13/2022  Reporting Period: 10/24/2022 to 12/13/2022    Subjective:  Nubia Coronel is a 81 year old right hand dominant female.    Diagnosis: R CMC arthritis  DOI:  10 years  DOS:  10/10/22  Procedure: CMC Arthroplasty    S:  Subjective changes as noted by patient: Good.       Functional changes noted by patient: Doing good.     Response to previous treatment:  good  Patient has noted adverse reaction to:   None      Objective:  Pain Level (Scale 0-10):   10/24/22 12/13/22   At Rest 3/10 0/10   With Use 8/10 3/10     Pain Description:  Date 10/24/2022 12/13/22   Location R thumb CMC R thumb CMC   Pain Quality Dull and Shooting Dull   Frequency constant   Intermittent   Pain is worst  daytime or nighttime Daytime   Exacerbated by  occurs randomly, rotation of forearm Occurs randomly   Relieved by rest rest   Progression Gradually improving improving      Edema  Mild    Scar    Stitches removed today, steri-strips present    Sensation  WNL throughout all nerve distributions; per patient report    ROM  Pain Report: 0-10/10  Thumb 10/24/22 10/24/22 11/22/22 11/30/22 12/5/22 12/13/22   AROM (PROM) L R R R R R   MP -8/42 -5/27 0/36 35 37 33   IP +/44 0/24 +/30 60 50 45   RABD 37 28 NT  NT NT   PABD 41 34 NT  NT NT     Wrist 10/24/22 10/24/22 11/22/22 11/30/22 12/13/22   AROM (PROM) L R R R R   Extension 68 38 60 60 62   Flexion 75 38 64 60 67   RD 14 0 12  NT   UD 38 21 27  NT     Strength   (Measured in pounds)  Pain Report:  scale of 0-10/10   12/5/22 12/5/22 12/13/22 12/13/22   Trials L R L R   1  2  3 35 22 38 34   Average 35 22 38 34     Lat Pinch 12/5/22 12/5/22 12/13/22 12/13/22   Trials L R L R   1  2  3 9 5 9 5   Average 9 5 9 5     3 Pt Pinch 12/5/22 12/5/22 12/13/22 12/13/22   Trials L R L R   1  2  3 8 4 8 4   Average 8 4 8 4     Assessment:  Response to therapy has been improvement to:  ROM of Wrist:  All Planes  Thumb:  All Planes  Strength:    and pinch  Pain:  frequency is less, intensity of pain is decreased, duration of pain is decreased and less tender over affected area    Overall Assessment:  Patient's symptoms are resolving.  Patient is ready to progress to more complex exercises.  Patient is ready to progress to next level of protocol.  Patient is becoming more independent in home exercise program  Patient would benefit from continued therapy to achieve rehab potential  STG/LTG:  STGoals have been reviewed;  see goal sheet for details and updates.  LTGoals have been reviewed;  see goal sheet for details and updates.    I have re-evaluated this patient and find that the nature, scope, duration and intensity of the therapy is appropriate for the medical condition of the patient.    Plan:  Frequency:  1 X week, once daily  Duration:  for 2 weeks    Treatment Plan:    Modalities:    US and Paraffin   Therapeutic Exercise:    AROM, PROM, Tendon Gliding, Blocking, Place and Hold, Isotonics and Isometrics  Therapeutic Activities:   Functional activities   Neuromuscular re-ed:   Nerve Gliding, Sensory re-education, Desensitization, Kinesthetic Training, Proprioceptive Training and Isometrics  Manual Techniques:   Coordination/Dexterity, Joint mobilization and Myofascial release  Orthotic Fabrication:    Static and Forearm based  Self Care:    Self Care Tasks and Ergonomic Considerations    Discharge Plan:    Achieve all LTG.  Independent in home treatment program.  Reach maximal therapeutic benefit.    Home Program:   Wrist Active Range of Motion Extension and Flexion Combined  Wrist Active Range of Motion Circumduction  Thumb Active Range of Motion Composite Flexion  Thumb Active Range of Motion Circumduction  Orthosis Wear    Next Visit:  Check fit of orthosis and adjust PRN  Scar mobilization  Progress ROM

## 2022-12-14 NOTE — PROGRESS NOTES
[facility]    OUTPATIENT Occupational Therapy ORTHOPEDIC EVALUATION  PLAN OF TREATMENT FOR OUTPATIENT REHABILITATION  (COMPLETE FOR INITIAL CLAIMS ONLY)  Patient's Last Name, First Name, M.I.  YOB: 1940  DecjamesNubia Jose    Provider s Name:  [facility]   Medical Record No.  [id number]   Start of Care Date:  10/24/22   Onset Date:  10/10/22 (pt reports 10 years ago)   Treatment Diagnosis:  R CMC Arthroplasty Medical Diagnosis:     Pain of right thumb  Aftercare following surgery of the musculoskeletal system  Arthritis of carpometacarpal (CMC) joint of right thumb       Goals:     12/13/22 2015   Goal #1   Goal #1 household chores   Previous Performance Level Independent   Current Functional Task    Current Performance Level pain 3/10   STG Target Perfomance Open a tight or new jar   STG Target Perform Level Pain 2/10   Due Date 12/20/22   LTG Target Task/Performance Pain free household chores   Due Date 12/27/22       Therapy Frequency:  1x/week  Predicted Duration of Therapy Intervention:  2 weeks    Jennifer Mishra OT                 I CERTIFY THE NEED FOR THESE SERVICES FURNISHED UNDER        THIS PLAN OF TREATMENT AND WHILE UNDER MY CARE     (Physician attestation of this document indicates review and certification of the therapy plan).                     Certification Date From:  12/22/22   Certification Date To:  12/27/22    Referring Provider:  Stephon Gee    Initial Assessment        See Epic Evaluation SOC Date: 10/24/22

## 2022-12-14 NOTE — TELEPHONE ENCOUNTER
Hydrochlorothiazide 50 mg  Routing refill request to provider for review/approval because:  Blood Pressure out of range for [facility] refill protocol.    BP Readings from Last 3 Encounters:   09/13/22 (!) 140/74   08/24/22 120/74   05/10/22 124/60       LOV 05/10/2022

## 2022-12-15 NOTE — TELEPHONE ENCOUNTER
Please call patient, see if she has a home BP cuff. If so, please obtain home BP reading and date.

## 2022-12-20 ENCOUNTER — TELEPHONE (OUTPATIENT)
Dept: INTERNAL MEDICINE | Facility: CLINIC | Age: 82
End: 2022-12-20

## 2022-12-20 ENCOUNTER — VIRTUAL VISIT (OUTPATIENT)
Dept: INTERNAL MEDICINE | Facility: CLINIC | Age: 82
End: 2022-12-20
Payer: MEDICARE

## 2022-12-20 VITALS
RESPIRATION RATE: 18 BRPM | WEIGHT: 150 LBS | SYSTOLIC BLOOD PRESSURE: 139 MMHG | HEART RATE: 86 BPM | DIASTOLIC BLOOD PRESSURE: 63 MMHG | OXYGEN SATURATION: 96 % | HEIGHT: 63 IN | BODY MASS INDEX: 26.58 KG/M2 | TEMPERATURE: 97.9 F

## 2022-12-20 DIAGNOSIS — M79.7 FIBROMYALGIA: ICD-10-CM

## 2022-12-20 PROCEDURE — 99207 PR NO CHARGE LOS: CPT | Performed by: INTERNAL MEDICINE

## 2022-12-20 RX ORDER — HYDROCHLOROTHIAZIDE 50 MG/1
TABLET ORAL
Qty: 90 TABLET | Refills: 2 | Status: SHIPPED | OUTPATIENT
Start: 2022-12-20 | End: 2022-12-21

## 2022-12-20 RX ORDER — TRAMADOL HYDROCHLORIDE 50 MG/1
TABLET ORAL
Qty: 43 TABLET | Refills: 5 | Status: SHIPPED | OUTPATIENT
Start: 2022-12-20 | End: 2023-01-09

## 2022-12-20 NOTE — TELEPHONE ENCOUNTER
Please advise pt:    She needs a video appointment with me before leaving for [place] (needs to be done every six months in order to refill tramadol).   [place] state law demands that she must be in the state that provider is practicing). Okay to do this today at around 3:30 if she is able.    BP results noted--will refill hydrochlorothiazide.

## 2022-12-20 NOTE — TELEPHONE ENCOUNTER
Spoke with patient and she has not checked BP at home recently but reports her numbers are usually 127-130/60-70s.  She was seen in UC on 11/15 at [facility] and her BP was 139/63.    She has been taking her medication daily and will be leaving out of town next week sos he needs this refilled asakortney ALMAGUER RN, BSN

## 2022-12-20 NOTE — PROGRESS NOTES
"Laura is a 82 year old who is being evaluated via a billable telephone visit.      What phone number would you like to be contacted at? [phone]  How would you like to obtain your AVS? Mail a copy    Distant Location (provider location):  On-site    Assessment & Plan   (M79.7) Fibromyalgia  Comment: Refilled Rx as previous.   Plan: traMADol (ULTRAM) 50 MG tablet          Return in May 2023 for Annual Wellness Visit.        BMI:   Estimated body mass index is 25.6 kg/m  as calculated from the following:    Height as of 10/5/22: 1.619 m (5' 3.75\").    Weight as of 10/5/22: 67.1 kg (148 lb).         No follow-ups on file.    Eric Love MD,   [facility]    Subjective   Laura is a 82 year old, presenting for the following health issues:  No chief complaint on file.      HPI     Phone visit as required for refill of tramadol.   Tramadol working well at chronic dosing in controlling fibromyalgia symptoms.    She and her  will be travelling soon to [place], will be returning in May.      No adverse effects noted.     Review of Systems   REVIEW OF SYSTEMS: The following systems have been completely reviewed and are negative except as noted in the HPI:   Constitutional, musculoskeletal systems.       Objective           Vitals:  No vitals were obtained today due to virtual visit.    Physical Exam   alert and no distress  PSYCH: Alert and oriented times 3; coherent speech, normal   rate and volume, able to articulate logical thoughts  RESP: No cough, no audible wheezing, able to talk in full sentences  Remainder of exam unable to be completed due to telephone visits        Phone call duration: Under 5 minutes    "

## 2022-12-21 RX ORDER — HYDROCHLOROTHIAZIDE 50 MG/1
TABLET ORAL
Qty: 30 TABLET | Refills: 0 | Status: SHIPPED | OUTPATIENT
Start: 2022-12-21 | End: 2023-05-09

## 2022-12-21 NOTE — TELEPHONE ENCOUNTER
Call placed to patient. Advised of 30 day Rx.    [facility] confirms receipt of 90 day rx and shipped medication out this morning.

## 2022-12-21 NOTE — TELEPHONE ENCOUNTER
Patient calling.  Asking for a 30 day supply for Hydrochlorothiazide to be sent to local pharmacy while waiting for mail-order prescription to arrive.    Medication pended.    Routing refill request to provider for review/approval because:  Per refill protocol, RN cannot adjust medication quantity.    Please advise, thanks.

## 2022-12-22 ENCOUNTER — LAB REQUISITION (OUTPATIENT)
Dept: LAB | Facility: CLINIC | Age: 82
End: 2022-12-22
Payer: MEDICARE

## 2022-12-22 DIAGNOSIS — N89.8 OTHER SPECIFIED NONINFLAMMATORY DISORDERS OF VAGINA: ICD-10-CM

## 2022-12-22 PROCEDURE — 87086 URINE CULTURE/COLONY COUNT: CPT | Mod: ORL | Performed by: OBSTETRICS & GYNECOLOGY

## 2022-12-24 LAB — BACTERIA UR CULT: NO GROWTH

## 2022-12-28 ENCOUNTER — TELEPHONE (OUTPATIENT)
Dept: INTERNAL MEDICINE | Facility: CLINIC | Age: 82
End: 2022-12-28
Payer: MEDICARE

## 2022-12-28 NOTE — LETTER
December 28, 2022      Nubia Jose Decmacygonzales  [address] [address] [address]        Dear Nubia,    At [facility], we care about your health and well-being. A review of your chart has indicated that you are due for a Annual Wellness Visit in May 2023. Please contact us at [phone] to schedule an appointment.     If you have already had one or all of the above screening tests at another facility, please call us to update your chart.         Sincerely,      [facility]

## 2022-12-28 NOTE — TELEPHONE ENCOUNTER
Patient Quality Outreach    Patient is due for the following:   Physical Annual Wellness Visit    Next Steps:   Schedule a Annual Wellness Visit    Type of outreach:    Sent letter.      Questions for provider review:    None     Becky Blackburn, [facility]

## 2023-01-06 ENCOUNTER — TELEPHONE (OUTPATIENT)
Dept: INTERNAL MEDICINE | Facility: CLINIC | Age: 83
End: 2023-01-06

## 2023-01-06 DIAGNOSIS — M79.7 FIBROMYALGIA: ICD-10-CM

## 2023-01-06 NOTE — TELEPHONE ENCOUNTER
Patient needs her tramadol refills sent to the pharmacy below. She is in [place] for the next 4 months    [facility] [address] [place] [address] [address] [facility]  [phone]

## 2023-01-09 DIAGNOSIS — M79.7 FIBROMYALGIA: ICD-10-CM

## 2023-01-09 RX ORDER — TRAMADOL HYDROCHLORIDE 50 MG/1
TABLET ORAL
Qty: 43 TABLET | Refills: 5 | Status: SHIPPED | OUTPATIENT
Start: 2023-01-09 | End: 2023-01-10

## 2023-01-10 RX ORDER — TRAMADOL HYDROCHLORIDE 50 MG/1
TABLET ORAL
Qty: 43 TABLET | Refills: 5 | Status: SHIPPED | OUTPATIENT
Start: 2023-01-10 | End: 2023-05-09

## 2023-01-27 NOTE — TELEPHONE ENCOUNTER
Patient Quality Outreach    Patient is due for the following:   Physical Annual Wellness Visit    Next Steps:   Patient has upcoming appointment, these items will be addressed at that time. Appointment 5/09/2023 with Dr. Love.    Type of outreach:    Chart review performed, no outreach needed.      Questions for provider review:    None     Becky Blackburn, [facility]

## 2023-02-28 PROBLEM — M18.11 ARTHRITIS OF CARPOMETACARPAL (CMC) JOINT OF RIGHT THUMB: Status: RESOLVED | Noted: 2022-10-26 | Resolved: 2023-02-28

## 2023-02-28 PROBLEM — Z47.89 AFTERCARE FOLLOWING SURGERY OF THE MUSCULOSKELETAL SYSTEM: Status: RESOLVED | Noted: 2022-10-26 | Resolved: 2023-02-28

## 2023-02-28 PROBLEM — M79.644 PAIN OF RIGHT THUMB: Status: RESOLVED | Noted: 2022-10-26 | Resolved: 2023-02-28

## 2023-03-26 ENCOUNTER — HEALTH MAINTENANCE LETTER (OUTPATIENT)
Age: 83
End: 2023-03-26

## 2023-05-03 ENCOUNTER — TELEPHONE (OUTPATIENT)
Dept: PODIATRY | Facility: CLINIC | Age: 83
End: 2023-05-03
Payer: MEDICARE

## 2023-05-03 DIAGNOSIS — M21.6X2 PLANTAR FAT PAD ATROPHY OF LEFT FOOT: ICD-10-CM

## 2023-05-03 DIAGNOSIS — L84 CALLUS: ICD-10-CM

## 2023-05-03 DIAGNOSIS — B35.1 ONYCHOMYCOSIS OF TOENAIL: ICD-10-CM

## 2023-05-03 DIAGNOSIS — M79.672 LEFT FOOT PAIN: ICD-10-CM

## 2023-05-03 RX ORDER — CICLOPIROX OLAMINE 7.7 MG/G
CREAM TOPICAL DAILY
Qty: 30 G | Refills: 6 | Status: SHIPPED | OUTPATIENT
Start: 2023-05-03

## 2023-05-03 RX ORDER — UREA 200 MG/G
CREAM TOPICAL DAILY
Qty: 85 G | Refills: 5 | Status: SHIPPED | OUTPATIENT
Start: 2023-05-03

## 2023-05-03 NOTE — TELEPHONE ENCOUNTER
Received refill request from [facility] in [place] for ciclopirox 0.77% cream and urea 20% cream.    Refilled both orders.     Estefany Paniagua DPM

## 2023-05-09 ENCOUNTER — OFFICE VISIT (OUTPATIENT)
Dept: INTERNAL MEDICINE | Facility: CLINIC | Age: 83
End: 2023-05-09
Payer: MEDICARE

## 2023-05-09 VITALS
SYSTOLIC BLOOD PRESSURE: 132 MMHG | DIASTOLIC BLOOD PRESSURE: 62 MMHG | OXYGEN SATURATION: 95 % | HEART RATE: 95 BPM | RESPIRATION RATE: 16 BRPM | HEIGHT: 63 IN | WEIGHT: 151 LBS | TEMPERATURE: 97.8 F | BODY MASS INDEX: 26.75 KG/M2

## 2023-05-09 DIAGNOSIS — Z79.899 MEDICATION MANAGEMENT: ICD-10-CM

## 2023-05-09 DIAGNOSIS — Z00.00 ENCOUNTER FOR MEDICARE ANNUAL WELLNESS EXAM: Primary | ICD-10-CM

## 2023-05-09 DIAGNOSIS — F51.01 PRIMARY INSOMNIA: ICD-10-CM

## 2023-05-09 DIAGNOSIS — G89.4 PAIN SYNDROME, CHRONIC: ICD-10-CM

## 2023-05-09 DIAGNOSIS — E11.9 TYPE 2 DIABETES MELLITUS WITHOUT COMPLICATION, WITHOUT LONG-TERM CURRENT USE OF INSULIN (H): ICD-10-CM

## 2023-05-09 DIAGNOSIS — Z13.6 CARDIOVASCULAR SCREENING; LDL GOAL LESS THAN 130: ICD-10-CM

## 2023-05-09 DIAGNOSIS — M79.7 FIBROMYALGIA: ICD-10-CM

## 2023-05-09 DIAGNOSIS — R60.0 BILATERAL EDEMA OF LOWER EXTREMITY: ICD-10-CM

## 2023-05-09 DIAGNOSIS — G43.019 INTRACTABLE MIGRAINE WITHOUT AURA AND WITHOUT STATUS MIGRAINOSUS: ICD-10-CM

## 2023-05-09 DIAGNOSIS — Z79.899 CONTROLLED SUBSTANCE AGREEMENT SIGNED: ICD-10-CM

## 2023-05-09 LAB
CREAT UR-MCNC: 83 MG/DL
CREAT UR-MCNC: 83.1 MG/DL
ERYTHROCYTE [DISTWIDTH] IN BLOOD BY AUTOMATED COUNT: 12.8 % (ref 10–15)
HBA1C MFR BLD: 6.4 % (ref 0–5.6)
HCT VFR BLD AUTO: 46.9 % (ref 35–47)
HGB BLD-MCNC: 15.7 G/DL (ref 11.7–15.7)
MCH RBC QN AUTO: 33 PG (ref 26.5–33)
MCHC RBC AUTO-ENTMCNC: 33.5 G/DL (ref 31.5–36.5)
MCV RBC AUTO: 99 FL (ref 78–100)
MICROALBUMIN UR-MCNC: 18.2 MG/L
MICROALBUMIN/CREAT UR: 21.9 MG/G CR (ref 0–25)
PLATELET # BLD AUTO: 257 10E3/UL (ref 150–450)
RBC # BLD AUTO: 4.76 10E6/UL (ref 3.8–5.2)
WBC # BLD AUTO: 7.6 10E3/UL (ref 4–11)

## 2023-05-09 PROCEDURE — G0480 DRUG TEST DEF 1-7 CLASSES: HCPCS | Performed by: INTERNAL MEDICINE

## 2023-05-09 PROCEDURE — 99207 PR FOOT EXAM NO CHARGE: CPT | Mod: 25 | Performed by: INTERNAL MEDICINE

## 2023-05-09 PROCEDURE — 80061 LIPID PANEL: CPT | Performed by: INTERNAL MEDICINE

## 2023-05-09 PROCEDURE — 82043 UR ALBUMIN QUANTITATIVE: CPT | Performed by: INTERNAL MEDICINE

## 2023-05-09 PROCEDURE — 82570 ASSAY OF URINE CREATININE: CPT | Performed by: INTERNAL MEDICINE

## 2023-05-09 PROCEDURE — 80053 COMPREHEN METABOLIC PANEL: CPT | Performed by: INTERNAL MEDICINE

## 2023-05-09 PROCEDURE — 85027 COMPLETE CBC AUTOMATED: CPT | Performed by: INTERNAL MEDICINE

## 2023-05-09 PROCEDURE — 83036 HEMOGLOBIN GLYCOSYLATED A1C: CPT | Performed by: INTERNAL MEDICINE

## 2023-05-09 PROCEDURE — 99214 OFFICE O/P EST MOD 30 MIN: CPT | Mod: 25 | Performed by: INTERNAL MEDICINE

## 2023-05-09 PROCEDURE — 36415 COLL VENOUS BLD VENIPUNCTURE: CPT | Performed by: INTERNAL MEDICINE

## 2023-05-09 PROCEDURE — G0439 PPPS, SUBSEQ VISIT: HCPCS | Performed by: INTERNAL MEDICINE

## 2023-05-09 PROCEDURE — 84443 ASSAY THYROID STIM HORMONE: CPT | Performed by: INTERNAL MEDICINE

## 2023-05-09 RX ORDER — TOPIRAMATE 25 MG/1
TABLET, FILM COATED ORAL
Qty: 360 TABLET | Refills: 3 | Status: SHIPPED | OUTPATIENT
Start: 2023-05-09 | End: 2023-10-05

## 2023-05-09 RX ORDER — TRAZODONE HYDROCHLORIDE 50 MG/1
50 TABLET, FILM COATED ORAL AT BEDTIME
Qty: 90 TABLET | Refills: 3 | Status: SHIPPED | OUTPATIENT
Start: 2023-05-09 | End: 2023-11-21

## 2023-05-09 RX ORDER — TRAMADOL HYDROCHLORIDE 50 MG/1
TABLET ORAL
Qty: 43 TABLET | Refills: 5 | Status: SHIPPED | OUTPATIENT
Start: 2023-05-09 | End: 2023-11-14

## 2023-05-09 RX ORDER — HYDROCHLOROTHIAZIDE 50 MG/1
TABLET ORAL
Qty: 90 TABLET | Refills: 3 | Status: SHIPPED | OUTPATIENT
Start: 2023-05-09 | End: 2023-10-05

## 2023-05-09 SDOH — ECONOMIC STABILITY: TRANSPORTATION INSECURITY
IN THE PAST 12 MONTHS, HAS LACK OF TRANSPORTATION KEPT YOU FROM MEETINGS, WORK, OR FROM GETTING THINGS NEEDED FOR DAILY LIVING?: NO

## 2023-05-09 SDOH — ECONOMIC STABILITY: FOOD INSECURITY: WITHIN THE PAST 12 MONTHS, YOU WORRIED THAT YOUR FOOD WOULD RUN OUT BEFORE YOU GOT MONEY TO BUY MORE.: NEVER TRUE

## 2023-05-09 SDOH — ECONOMIC STABILITY: TRANSPORTATION INSECURITY
IN THE PAST 12 MONTHS, HAS THE LACK OF TRANSPORTATION KEPT YOU FROM MEDICAL APPOINTMENTS OR FROM GETTING MEDICATIONS?: NO

## 2023-05-09 SDOH — ECONOMIC STABILITY: FOOD INSECURITY: WITHIN THE PAST 12 MONTHS, THE FOOD YOU BOUGHT JUST DIDN'T LAST AND YOU DIDN'T HAVE MONEY TO GET MORE.: NEVER TRUE

## 2023-05-09 SDOH — HEALTH STABILITY: PHYSICAL HEALTH: ON AVERAGE, HOW MANY DAYS PER WEEK DO YOU ENGAGE IN MODERATE TO STRENUOUS EXERCISE (LIKE A BRISK WALK)?: 3 DAYS

## 2023-05-09 SDOH — HEALTH STABILITY: PHYSICAL HEALTH: ON AVERAGE, HOW MANY MINUTES DO YOU ENGAGE IN EXERCISE AT THIS LEVEL?: 40 MIN

## 2023-05-09 SDOH — ECONOMIC STABILITY: INCOME INSECURITY: IN THE LAST 12 MONTHS, WAS THERE A TIME WHEN YOU WERE NOT ABLE TO PAY THE MORTGAGE OR RENT ON TIME?: NO

## 2023-05-09 ASSESSMENT — ENCOUNTER SYMPTOMS: BREAST MASS: 0

## 2023-05-09 ASSESSMENT — ACTIVITIES OF DAILY LIVING (ADL): CURRENT_FUNCTION: NO ASSISTANCE NEEDED

## 2023-05-09 ASSESSMENT — SOCIAL DETERMINANTS OF HEALTH (SDOH)
WITHIN THE LAST YEAR, HAVE YOU BEEN KICKED, HIT, SLAPPED, OR OTHERWISE PHYSICALLY HURT BY YOUR PARTNER OR EX-PARTNER?: NO
WITHIN THE LAST YEAR, HAVE YOU BEEN AFRAID OF YOUR PARTNER OR EX-PARTNER?: NO
HOW HARD IS IT FOR YOU TO PAY FOR THE VERY BASICS LIKE FOOD, HOUSING, MEDICAL CARE, AND HEATING?: NOT HARD AT ALL
WITHIN THE LAST YEAR, HAVE YOU BEEN HUMILIATED OR EMOTIONALLY ABUSED IN OTHER WAYS BY YOUR PARTNER OR EX-PARTNER?: NO
WITHIN THE LAST YEAR, HAVE TO BEEN RAPED OR FORCED TO HAVE ANY KIND OF SEXUAL ACTIVITY BY YOUR PARTNER OR EX-PARTNER?: NO

## 2023-05-09 ASSESSMENT — LIFESTYLE VARIABLES
HOW MANY STANDARD DRINKS CONTAINING ALCOHOL DO YOU HAVE ON A TYPICAL DAY: 1 OR 2
HOW OFTEN DO YOU HAVE A DRINK CONTAINING ALCOHOL: 4 OR MORE TIMES A WEEK
HOW OFTEN DO YOU HAVE SIX OR MORE DRINKS ON ONE OCCASION: NEVER
SKIP TO QUESTIONS 9-10: 1
AUDIT-C TOTAL SCORE: 4

## 2023-05-09 NOTE — LETTER
Opioid / Opioid Plus Controlled Substance Agreement    This is an agreement between you and your provider about the safe and appropriate use of controlled substance/opioids prescribed by your care team. Controlled substances are medicines that can cause physical and mental dependence (abuse).    There are strict laws about having and using these medicines. We here at [facility] are committing to working with you in your efforts to get better. To support you in this work, we ll help you schedule regular office appointments for medicine refills. If we must cancel or change your appointment for any reason, we ll make sure you have enough medicine to last until your next appointment.     As a Provider, I will:  Listen carefully to your concerns and treat you with respect.   Recommend a treatment plan that I believe is in your best interest. This plan may involve therapies other than opioid pain medication.   Talk with you often about the possible benefits, and the risk of harm of any medicine that we prescribe for you.   Provide a plan on how to taper (discontinue or go off) using this medicine if the decision is made to stop its use.    As a Patient, I understand that opioid(s):   Are a controlled substance prescribed by my care team to help me function or work and manage my condition(s).   Are strong medicines and can cause serious side effects such as:  Drowsiness, which can seriously affect my driving ability  A lower breathing rate, enough to cause death  Harm to my thinking ability   Depression   Abuse of and addiction to this medicine  Need to be taken exactly as prescribed. Combining opioids with certain medicines or chemicals (such as illegal drugs, sedatives, sleeping pills, and benzodiazepines) can be dangerous or even fatal. If I stop opioids suddenly, I may have severe withdrawal symptoms.  Do not work for all types of pain nor for all patients. If they re not helpful, I may be asked to stop  them.        The risks, benefits and side effects of these medicine(s) were explained to me. I agree that:  I will take part in other treatments as advised by my care team. This may be psychiatry or counseling, physical therapy, behavioral therapy, group treatment or a referral to a specialist.     I will keep all my appointments. I understand that this is part of the monitoring of opioids. My care team may require an office visit for EVERY opioid/controlled substance refill. If I miss appointments or don t follow instructions, my care team may stop my medicine.    I will take my medicines as prescribed. I will not change the dose or schedule unless my care team tells me to. There will be no refills if I run out early.     I may be asked to come to the clinic and complete a urine drug test or complete a pill count at any time. If I don t give a urine sample or participate in a pill count, the care team may stop my medicine.    I will only receive prescriptions from this clinic for chronic pain. If I am treated by another provider for acute pain issues, I will tell them that I am taking opioid pain medication for chronic pain and that I have a treatment agreement with this provider. I will inform my [facility] care team within one business day if I am given a prescription for any pain medication by another healthcare provider. My [facility] care team can contact other providers and pharmacists about my use of any medicines.    It is up to me to make sure that I don t run out of my medicines on weekends or holidays. If my care team is willing to refill my opioid prescription without a visit, I must request refills only during office hours. Refills may take up to 3 business days to process. I will use one pharmacy to fill all my opioid and other controlled substance prescriptions. I will notify the clinic about any changes to my insurance or medication availability.    I am responsible for my  prescriptions. If the medicine/prescription is lost, stolen or destroyed, it will not be replaced. I also agree not to share controlled substance medicines with anyone.    I am aware I should not use any illegal or recreational drugs. I agree not to drink alcohol unless my care team says I can.       If I enroll in the [place] Medical Cannabis program, I will tell my care team prior to my next refill.     I will tell my care team right away if I become pregnant, have a new medical problem treated outside of my regular clinic, or have a change in my medications.    I understand that this medicine can affect my thinking, judgment and reaction time. Alcohol and drugs affect the brain and body, which can affect the safety of my driving. Being under the influence of alcohol or drugs can affect my decision-making, behaviors, personal safety, and the safety of others. Driving while impaired (DWI) can occur if a person is driving, operating, or in physical control of a car, motorcycle, boat, snowmobile, ATV, motorbike, off-road vehicle, or any other motor vehicle ([place] Statute 169A.20). I understand the risk if I choose to drive or operate any vehicle or machinery.    I understand that if I do not follow any of the conditions above, my prescriptions or treatment may be stopped or changed.          Opioids  What You Need to Know    What are opioids?   Opioids are pain medicines that must be prescribed by a doctor. They are also known as narcotics.     Examples are:   morphine (MS Contin, Blank)  oxycodone (Oxycontin)  oxycodone and acetaminophen (Percocet)  hydrocodone and acetaminophen (Vicodin, Norco)   fentanyl patch (Duragesic)   hydromorphone (Dilaudid)   methadone  codeine (Tylenol #3)     What do opioids do well?   Opioids are best for severe short-term pain such as after a surgery or injury. They may work well for cancer pain. They may help some people with long-lasting (chronic) pain.     What do opioids NOT do  well?   Opioids never get rid of pain entirely, and they don t work well for most patients with chronic pain. Opioids don t reduce swelling, one of the causes of pain.                                    Other ways to manage chronic pain and improve function include:     Treat the health problem that may be causing pain  Anti-inflammation medicines, which reduce swelling and tenderness, such as ibuprofen (Advil, Motrin) or naproxen (Aleve)  Acetaminophen (Tylenol)  Antidepressants and anti-seizure medicines, especially for nerve pain  Topical treatments such as patches or creams  Injections or nerve blocks  Chiropractic or osteopathic treatment  Acupuncture, massage, deep breathing, meditation, visual imagery, aromatherapy  Use heat or ice at the pain site  Physical therapy   Exercise  Stop smoking  Take part in therapy       Risks and side effects     Talk to your doctor before you start or decide to keep taking opioids. Possible side effects include:    Lowering your breathing rate enough to cause death  Overdose, including death, especially if taking higher than prescribed doses  Worse depression symptoms; less pleasure in things you usually enjoy  Feeling tired or sluggish  Slower thoughts or cloudy thinking  Being more sensitive to pain over time; pain is harder to control  Trouble sleeping or restless sleep  Changes in hormone levels (for example, less testosterone)  Changes in sex drive or ability to have sex  Constipation  Unsafe driving  Itching and sweating  Dizziness  Nausea, throwing up and dry mouth    What else should I know about opioids?    Opioids may lead to dependence, tolerance, or addiction.    Dependence means that if you stop or reduce the medicine too quickly, you will have withdrawal symptoms. These include loose poop (diarrhea), jitters, flu-like symptoms, nervousness and tremors. Dependence is not the same as addiction.                     Tolerance means needing higher doses over time to  get the same effect. This may increase the chance of serious side effects.    Addiction is when people improperly use a substance that harms their body, their mind or their relations with others. Use of opiates can cause a relapse of addiction if you have a history of drug or alcohol abuse.    People who have used opioids for a long time may have a lower quality of life, worse depression, higher levels of pain and more visits to doctors.    You can overdose on opioids. Take these steps to lower your risk of overdose:    Recognize the signs:  Signs of overdose include decrease or loss of consciousness (blackout), slowed breathing, trouble waking up and blue lips. If someone is worried about overdose, they should call 911.    Talk to your doctor about Narcan (naloxone).   If you are at risk for overdose, you may be given a prescription for Narcan. This medicine very quickly reverses the effects of opioids.   If you overdose, a friend or family member can give you Narcan while waiting for the ambulance. They need to know the signs of overdose and how to give Narcan.     Don't use alcohol or street drugs.   Taking them with opioids can cause death.    Do not take any of these medicines unless your doctor says it s OK. Taking these with opioids can cause death:  Benzodiazepines, such as lorazepam (Ativan), alprazolam (Xanax) or diazepam (Valium)  Muscle relaxers, such as cyclobenzaprine (Flexeril)  Sleeping pills like zolpidem (Ambien)   Other opioids      How to keep you and other people safe while taking opioids:    Never share your opioids with others.  Opioid medicines are regulated by the Drug Enforcement Agency ([employer]). Selling or sharing medications is a criminal act.    2. Be sure to store opioids in a secure place, locked up if possible. Young children can easily swallow them and overdose.    3. When you are traveling with your medicines, keep them in the original bottles. If you use a pill box, be sure you also  carry a copy of your medicine list from your clinic or pharmacy.    4. Safe disposal of opioids    Most pharmacies have places to get rid of medicine, called disposal kiosks. Medicine disposal options are also available in every [facility]. Search your county and  medication disposal  to find more options. You can find more details at:  https://[email or web address]./living-green/managing-unwanted-medications     I agree that my provider, clinic care team, and pharmacy may work with any city, state or federal law enforcement agency that investigates the misuse, sale, or other diversion of my controlled medicine. I will allow my provider to discuss my care with, or share a copy of, this agreement with any other treating provider, pharmacy or emergency room where I receive care.    I have read this agreement and have asked questions about anything I did not understand.    _______________________________________________________  Patient Signature - Nubia Coronel _____________________                   Date     _______________________________________________________  Provider Signature - Eric Love MD, MD   _____________________                   Date     _______________________________________________________  Witness Signature (required if provider not present while patient signing)   _____________________                   Date

## 2023-05-09 NOTE — PROGRESS NOTES
"SUBJECTIVE:   Laura is a 82 year old who presents for Preventive Visit.        5/9/2023     9:16 AM   Additional Questions   Roomed by Becky BIRD CMA   Patient has been advised of split billing requirements and indicates understanding: Yes  Are you in the first 12 months of your Medicare coverage?  No    Healthy Habits:     In general, how would you rate your overall health?  Good    Frequency of exercise:  2-3 days/week    Duration of exercise:  45-60 minutes    Do you usually eat at least 4 servings of fruit and vegetables a day, include whole grains    & fiber and avoid regularly eating high fat or \"junk\" foods?  Yes    Taking medications regularly:  Yes    Medication side effects:  None    Ability to successfully perform activities of daily living:  No assistance needed    Home Safety:  No safety concerns identified    Hearing Impairment:  No hearing concerns    In the past 6 months, have you been bothered by leaking of urine? Yes    In general, how would you rate your overall mental or emotional health?  Good      PHQ-2 Total Score: 1    Additional concerns today:  Yes           Have you ever done Advance Care Planning? (For example, a Health Directive, POLST, or a discussion with a medical provider or your loved ones about your wishes): Yes, advance care planning is on file.       Fall risk  Fallen 2 or more times in the past year?: No  Any fall with injury in the past year?: No    Cognitive Screening   1) Repeat 3 items (Leader, Season, Table)    2) Clock draw: NORMAL  3) 3 item recall: Recalls 3 objects  Results: 3 items recalled: COGNITIVE IMPAIRMENT LESS LIKELY    Mini-CogTM Copyright JAMI Gilbert. Licensed by the author for use in [facility]; reprinted with permission (su@.[facility]). All rights reserved.      Do you have sleep apnea, excessive snoring or daytime drowsiness?: no    Reviewed and updated as needed this visit by clinical staff   Tobacco  Allergies  Meds              Reviewed and " updated as needed this visit by Provider                 Social History     Tobacco Use     Smoking status: Former     Types: Cigarettes     Quit date: 10/1/1981     Years since quittin.6     Passive exposure: Never     Smokeless tobacco: Never   Vaping Use     Vaping status: Never Used     Passive vaping exposure: Yes   Substance Use Topics     Alcohol use: Not Currently     Comment: social             2023     9:12 AM   Alcohol Use   Prescreen: >3 drinks/day or >7 drinks/week? No     Do you have a current opioid prescription? No  Do you use any other controlled substances or medications that are not prescribed by a provider? None        PROBLEMS TO ADD ON...In addition to an Annual Wellness Exam, we addressed chronic neck/shoulders/upper back pain, migraines, new diagnosis of diabetes mellitus.    We reviewed and updated a Controlled Substance Agreement today. She continues to take a tramadol tablet once each morning, and takes a second pill every Tuesday, Thursday and Saturday evening (dates on which she goes to the health club).     She takes Fiorinal infrequently for migraines.     She is advised that her A1c from last September reached diabetes criteria, which is 6.5 or above. Advised diabetes education, and rechecking today and again in six months.       Current providers sharing in care for this patient include:   Patient Care Team:  Eric Love MD as PCP - General (Internal Medicine)  Eric Love MD as Assigned PCP  Eric Love MD as Assigned Pain Medication Provider  Josiah Mccabe PA-C as Assigned Musculoskeletal Provider    The following health maintenance items are reviewed in Epic and correct as of today:  Health Maintenance   Topic Date Due     ZOSTER IMMUNIZATION (1 of 2) Never done     MEDICARE ANNUAL WELLNESS VISIT  2020     INFLUENZA VACCINE (1) 2022     URINE DRUG SCREEN  05/10/2023     ANNUAL REVIEW OF HM ORDERS  2023     FALL RISK ASSESSMENT   "05/09/2024     ADVANCE CARE PLANNING  12/17/2024     DTAP/TDAP/TD IMMUNIZATION (2 - Td or Tdap) 09/13/2032     DEXA  07/06/2037     PHQ-2 (once per calendar year)  Completed     Pneumococcal Vaccine: 65+ Years  Completed     COVID-19 Vaccine  Completed     IPV IMMUNIZATION  Aged Out     MENINGITIS IMMUNIZATION  Aged Out      Patient has received both pneumonia vaccinations (Prevnar-13 and Pneumovax-23)     Mammogram Screening: Mammogram Screening: Recommended mammography every 1-2 years with patient discussion and risk factor consideration    Mammogram Screening - Patient over age 75, has elected to continue with screening.  Pertinent mammograms are reviewed under the imaging tab.    Review of Systems   HENT: Positive for hearing loss.    Breasts:  Negative for tenderness, breast mass and discharge.   Genitourinary: Negative for pelvic pain, vaginal bleeding and vaginal discharge.     REVIEW OF SYSTEMS: The following systems have been completely reviewed and are negative except as noted above:   Constitutional, HEENT, respiratory, cardiovascular, gastrointestinal, genitourinary, musculoskeletal, dermatologic, hematologic, endocrine, psychiatric, and neurologic systems.      OBJECTIVE:   /62 (BP Location: Right arm, Patient Position: Sitting, Cuff Size: Adult Regular)   Pulse 95   Temp 97.8  F (36.6  C) (Tympanic)   Resp 16   Ht 1.61 m (5' 3.39\")   Wt 68.5 kg (151 lb)   LMP  (LMP Unknown)   SpO2 95%   Breastfeeding No   BMI 26.42 kg/m   Estimated body mass index is 26.42 kg/m  as calculated from the following:    Height as of this encounter: 1.61 m (5' 3.39\").    Weight as of this encounter: 68.5 kg (151 lb).     Physical Exam  GENERAL: healthy, alert and no distress  EYES: Eyes grossly normal to inspection, PERRL and conjunctivae and sclerae normal  HENT: ear canals and TM's normal, nose and mouth without ulcers or lesions  NECK: no adenopathy, no asymmetry, masses, or scars and thyroid normal to " palpation  RESP: lungs clear to auscultation - no rales, rhonchi or wheezes  BREAST/PELVIC: exams not performed.  CV: regular rate and rhythm, normal S1 S2, no S3 or S4, no murmur, click or rub, no peripheral edema and peripheral pulses strong  ABDOMEN: soft, nontender, no hepatosplenomegaly, no masses and bowel sounds normal  MS: no gross musculoskeletal defects noted, no edema  SKIN: no suspicious lesions or rashes  NEURO: Normal strength and tone, mentation intact and speech normal  PSYCH: mentation appears normal, affect normal/bright  Foot Exam: normal DP and PT pulses, no trophic changes or ulcerative lesions, normal sensory exam and normal monofilament exam      Diagnostic Test Results:  Labs reviewed in Epic    ASSESSMENT / PLAN:   (Z00.00) Encounter for Medicare annual wellness exam  (primary encounter diagnosis)  Comment: Stable health. See epic orders.   Plan: PRIMARY CARE FOLLOW-UP SCHEDULING          (M79.7) Fibromyalgia  Comment: Chronic condition with quality of life greatly improved on tramadol at consistent dosing regimen. Check urine. Updated CSA.   Plan: traMADol (ULTRAM) 50 MG tablet, [id number] - Urine        Drug Confirmation Panel (Comprehensive),         OFFICE/OUTPT VISIT,EST,LEVL IV          (Z79.899) Controlled substance agreement signed  Plan: [id number] - Urine Drug Confirmation Panel         (Comprehensive), OFFICE/OUTPT VISIT,EST,LEVL IV          (G89.4) Pain syndrome, chronic  Plan: [id number] - Urine Drug Confirmation Panel         (Comprehensive), OFFICE/OUTPT VISIT,EST,LEVL IV          (E11.9) Type 2 diabetes mellitus without complication, without long-term current use of insulin (H)  Comment: Discussed new diagnosis. Referred for diabetes education. Repeat A1c.   Plan: AMB Adult Diabetes Educator Referral,         **Hemoglobin A1c FUTURE 3mo, Albumin Random         Urine Quantitative with Creat Ratio, **TSH with        free T4 reflex FUTURE 2mo, OFFICE/OUTPT         VISIT,EST,LEVL IV, FOOT  "EXAM          (G43.019) Intractable migraine without aura and without status migrainosus  Comment: Refill topamax, and Fiorinal as rarely needed.   Plan: topiramate (TOPAMAX) 25 MG tablet, OFFICE/OUTPT        VISIT,EST,LEVL IV          (R60.0) Bilateral edema of lower extremity  Comment: Patient has taken hydrochlorothiazide at this dose chronically without hypokalemia.   Plan: hydrochlorothiazide (HYDRODIURIL) 50 MG tablet,        OFFICE/OUTPT VISIT,EST,LEVL IV          (F51.01) Primary insomnia  Comment: Continue chronic trazodone.   Plan: traZODone (DESYREL) 50 MG tablet, OFFICE/OUTPT         VISIT,EST,LEVL IV          (Z13.6) CARDIOVASCULAR SCREENING; LDL GOAL LESS THAN 130  Plan: Lipid panel reflex to direct LDL Fasting,         **Comprehensive metabolic panel FUTURE 2mo          (Z79.899) Medication management  Plan: **CBC with platelets FUTURE 2mo            Patient has been advised of split billing requirements and indicates understanding: Yes      COUNSELING:  Reviewed preventive health counseling, as reflected in patient instructions      BMI:   Estimated body mass index is 26.42 kg/m  as calculated from the following:    Height as of this encounter: 1.61 m (5' 3.39\").    Weight as of this encounter: 68.5 kg (151 lb).         She reports that she quit smoking about 41 years ago. Her smoking use included cigarettes. She has never been exposed to tobacco smoke. She has never used smokeless tobacco.      Appropriate preventive services were discussed with this patient, including applicable screening as appropriate for cardiovascular disease, diabetes, osteopenia/osteoporosis, and glaucoma.  As appropriate for age/gender, discussed screening for colorectal cancer, prostate cancer, breast cancer, and cervical cancer. Checklist reviewing preventive services available has been given to the patient.    Reviewed patients plan of care and provided an AVS. The Basic Care Plan (routine screening as documented in " Health Maintenance) for Nubia meets the Care Plan requirement. This Care Plan has been established and reviewed with the Patient.      Eric Love MD,   [facility]          Information on urinary incontinence and treatment options given to patient.

## 2023-05-09 NOTE — PATIENT INSTRUCTIONS
Patient Education   Personalized Prevention Plan  You are due for the preventive services outlined below.  Your care team is available to assist you in scheduling these services.  If you have already completed any of these items, please share that information with your care team to update in your medical record.  Health Maintenance Due   Topic Date Due    Zoster (Shingles) Vaccine (1 of 2) Never done    Flu Vaccine (1) 09/01/2022    URINE DRUG SCREEN  05/10/2023       Urinary Incontinence, Female (Adult)   Urinary incontinence means loss of bladder control. This problem affects many women, especially as they get older. If you have incontinence, you may be embarrassed to ask for help. But know that this problem can be treated.   Types of Incontinence  There are different types of incontinence. Two of the main types are described here. You can have more than one type.   Stress incontinence. With this type, urine leaks when pressure (stress) is put on the bladder. This may happen when you cough, sneeze, or laugh. Stress incontinence most often occurs because the pelvic floor muscles that support the bladder and urethra are weak. This can happen after pregnancy and vaginal childbirth or a hysterectomy. It can also be due to excess body weight or hormone changes.  Urge incontinence (also called overactive bladder). With this type, a sudden urge to urinate is felt often. This may happen even though there may not be much urine in the bladder. The need to urinate often during the night is common. Urge incontinence most often occurs because of bladder spasms. This may be due to bladder irritation or infection. Damage to bladder nerves or pelvic muscles, constipation, and certain medicines can also lead to urge incontinence.  Treatment depends on the cause. Further evaluation is needed to find the type you have. This will likely include an exam and certain tests. Based on the results, you and your healthcare provider can  then plan treatment. Until a diagnosis is made, the home care tips below can help ease symptoms.   Home care  Do pelvic floor muscle exercises, if they are prescribed. The pelvic floor muscles help support the bladder and urethra. Many women find that their symptoms improve when doing special exercises that strengthen these muscles. To do the exercises, contract the muscles you would use to stop your stream of urine. But do this when you re not urinating. Hold for 10 seconds, then relax. Repeat 10 to 20 times in a row, at least 3 times a day. Your healthcare provider may give you other instructions for how to do the exercises and how often.  Keep a bladder diary. This helps track how often and how much you urinate over a set period of time. Bring this diary with you to your next visit with the provider. The information can help your provider learn more about your bladder problem.  Lose weight, if advised to by your provider. Extra weight puts pressure on the bladder. Your provider can help you create a weight-loss plan that s right for you. This may include exercising more and making certain diet changes.  Don't have foods and drinks that may irritate the bladder. These can include alcohol and caffeinated drinks.  Quit smoking. Smoking and other tobacco use can lead to a long-term (chronic) cough that strains the pelvic floor muscles. Smoking may also damage the bladder and urethra. Talk with your provider about treatments or methods you can use to quit smoking.  If drinking large amounts of fluid makes you have symptoms, you may be advised to limit your fluid intake. You may also be advised to drink most of your fluids during the day and to limit fluids at night.  If you re worried about urine leakage or accidents, you may wear absorbent pads to catch urine. Change the pads often. This helps reduce discomfort. It may also reduce the risk of skin or bladder infections.    Follow-up care  Follow up with your  "healthcare provider, or as directed. It may take some to find the right treatment for your problem. But healthy lifestyle changes can be made right away. These include such things as exercising on a regular basis, eating a healthy diet, losing weight (if needed), and quitting smoking. Your treatment plan may include special therapies or medicines. Certain procedures or surgery may also be options. Talk about any questions you have with your provider.   When to seek medical advice  Call the healthcare provider right away if any of these occur:  Fever of 100.4 F (38 C) or higher, or as directed by your provider  Bladder pain or fullness  Belly swelling  Nausea or vomiting  Back pain  Weakness, dizziness, or fainting  [employer] last reviewed this educational content on 1/1/2020 2000-2022 [employer]. All rights reserved. This information is not intended as a substitute for professional medical care. Always follow your healthcare professional's instructions.      Your lab tests from last September showed that your \"A1c\" fell into diabetes range.   Hemoglobin A1C measures control of diabetes. Your Hemoglobin A1C last September was just barely into diabetes range, which is 6.5.  I've placed an order to have a Diabetes Educator contact you to schedule a visit to learn about how to check a blood sugar and about a good eating plan.   Refilled needed meds.  We reviewed and updated another Controlled Substance Agreement which is good for another year.   See me in six months or so (Next December is fine).          "

## 2023-05-10 LAB
ALBUMIN SERPL BCG-MCNC: 4.6 G/DL (ref 3.5–5.2)
ALP SERPL-CCNC: 100 U/L (ref 35–104)
ALT SERPL W P-5'-P-CCNC: 65 U/L (ref 10–35)
ANION GAP SERPL CALCULATED.3IONS-SCNC: 12 MMOL/L (ref 7–15)
AST SERPL W P-5'-P-CCNC: 48 U/L (ref 10–35)
BILIRUB SERPL-MCNC: 0.3 MG/DL
BUN SERPL-MCNC: 16 MG/DL (ref 8–23)
CALCIUM SERPL-MCNC: 10.4 MG/DL (ref 8.8–10.2)
CHLORIDE SERPL-SCNC: 101 MMOL/L (ref 98–107)
CHOLEST SERPL-MCNC: 198 MG/DL
CREAT SERPL-MCNC: 0.8 MG/DL (ref 0.51–0.95)
DEPRECATED HCO3 PLAS-SCNC: 30 MMOL/L (ref 22–29)
GFR SERPL CREATININE-BSD FRML MDRD: 73 ML/MIN/1.73M2
GLUCOSE SERPL-MCNC: 149 MG/DL (ref 70–99)
HDLC SERPL-MCNC: 81 MG/DL
LDLC SERPL CALC-MCNC: 95 MG/DL
NONHDLC SERPL-MCNC: 117 MG/DL
POTASSIUM SERPL-SCNC: 3.7 MMOL/L (ref 3.4–5.3)
PROT SERPL-MCNC: 7.4 G/DL (ref 6.4–8.3)
SODIUM SERPL-SCNC: 143 MMOL/L (ref 136–145)
TRIGL SERPL-MCNC: 110 MG/DL
TSH SERPL DL<=0.005 MIU/L-ACNC: 1.14 UIU/ML (ref 0.3–4.2)

## 2023-05-11 ENCOUNTER — TELEPHONE (OUTPATIENT)
Dept: PODIATRY | Facility: CLINIC | Age: 83
End: 2023-05-11
Payer: MEDICARE

## 2023-05-11 LAB
N-NORTRAMADOL/CREAT UR CFM: ABNORMAL NG/MG {CREAT}
O-NORTRAMADOL UR CFM-MCNC: ABNORMAL NG/ML
TRAMADOL CTO UR CFM-MCNC: 9560 NG/ML
TRAMADOL/CREAT UR: ABNORMAL NG/MG {CREAT}

## 2023-05-11 NOTE — TELEPHONE ENCOUNTER
[facility] Call Center    Phone Message    May a detailed message be left on voicemail: yes     Reason for Call: Other: Pt is having trouble with [facility] fulfilling prescription for the ciclopirox (LOPROX) 0.77 % cream, please call regarding this     Action Taken: Other: bu podiatry    Travel Screening: Not Applicable

## 2023-05-12 NOTE — TELEPHONE ENCOUNTER
Phone call to [facility]. They states insurance is saying it is too early to fill the ciclopirox (LOPROX) 0.77 % cream.   Pharmacists asks where patient is applying it. Per office visit note of 10/5/22, it appears patient is using it on at least 3 toes/nails. Pharmacist thought that should be good for at least a month.   Will contact patient and discuss.     Phone call to patient. She states she has been refilling the Loprox in [place] and [place]. She is back in [place] now and just refilled it. She feels the tube she got this time was much smaller than the first time she filled in [place]. However, the tube she filled in [place] was about the same size. Had her read the tube and it states 30g.     Inquired how many toes she is putting the cream on. She is using it on all 10 toes daily. She feels she will run out soon. She does have about 4-5 days left of a previous tube she got that she feels is a 90 g tube.   Will discuss with  Pharmacist and provider and get back with her next week when Dr. Paniagua is still in the office.     AGUSTO Medina, RN

## 2023-05-12 NOTE — TELEPHONE ENCOUNTER
Phone call back to [facility] to inquire if patient ever got a 90g tube as she had stated. Pharmacist states that patient got a 90g tube 11/14/22 with them for a 90 day supply.  She filled it once in [place] on 2/9/23 and only received a 15g tube.   The tube she just got on 5/5/23 was 30g.     Phone call to patient and let her know the above. Due to the different size tubes, suggested we see how she does with the 30g tube and we are hoping it lasts up to a month. Asked that she calls back if she finds it is not lasting long enough. She verbalized understanding.     AGUSTO Medina RN

## 2023-05-16 ENCOUNTER — HOSPITAL ENCOUNTER (OUTPATIENT)
Dept: MAMMOGRAPHY | Facility: CLINIC | Age: 83
Discharge: HOME | End: 2023-05-16
Attending: OBSTETRICS & GYNECOLOGY | Admitting: OBSTETRICS & GYNECOLOGY
Payer: MEDICARE

## 2023-05-16 DIAGNOSIS — Z12.31 VISIT FOR SCREENING MAMMOGRAM: ICD-10-CM

## 2023-05-16 PROCEDURE — 77067 SCR MAMMO BI INCL CAD: CPT

## 2023-05-30 ENCOUNTER — ALLIED HEALTH/NURSE VISIT (OUTPATIENT)
Dept: EDUCATION SERVICES | Facility: CLINIC | Age: 83
End: 2023-05-30
Payer: MEDICARE

## 2023-05-30 DIAGNOSIS — E11.9 TYPE 2 DIABETES MELLITUS WITHOUT COMPLICATION, WITHOUT LONG-TERM CURRENT USE OF INSULIN (H): Primary | ICD-10-CM

## 2023-05-30 PROCEDURE — G0108 DIAB MANAGE TRN  PER INDIV: HCPCS

## 2023-05-30 NOTE — PROGRESS NOTES
"Diabetes Self-Management Education & Support    Presents for: Individual review    Type of Service: In Person Visit    ASSESSMENT:  Laura is here to learn more about diabetes. She is questioning the results of her lab tests, both glucose and A1c.   Discussed how they are used to diagnose diabetes.     Discussed healthy eating for diabetes including plate planning method and basics of carbohydrate counting. Per recall below, she is restricting carbohydrates at meals.  Positive reinforcement given for the exercise she is doing.    Laura states \"I guess I would rather check my A1c in 3 months than test my blood sugar with a meter.\" She has C/O fibromyalgia pain.       Patient's most recent   Lab Results   Component Value Date    A1C 6.4 05/09/2023    A1C 6.0 12/19/2019     is meeting goal of <7.0    Diabetes knowledge and skills assessment:   Patient is knowledgeable in diabetes management concepts related to: Being Active and Problem Solving    Continue education with the following diabetes management concepts: Healthy Eating and Reducing Risks    Based on learning assessment above, most appropriate setting for further diabetes education would be: Individual    PLAN    Meal Plan Recommendation:   Use portion control and plate planning method.  Carbohydrates to aim for at meals: 30 grams and snacks: 0-15 grams.  Use [employer] for recipe and meal planning ideas  Use [employer] dior for carbohydrate reference.    Exercise / activity plan:   Recommend starting slow and increasing as tolerated to goal of 150 minutes per week.     Topics to cover at upcoming visits: Healthy Eating and Reducing Risks    Follow-up: 7/11/23- reassess if BG testing is something she would like to do at this visit.     See Care Plan for co-developed, patient-state behavior change goals.  AVS provided for patient today.    Education Materials Provided:  My Plate Planner    SUBJECTIVE/OBJECTIVE:  Presents for: Individual " "review  Accompanied by: Self  Diabetes education in the past 24mo: No  Focus of Visit: Healthy Eating  Diabetes type: Type 2  Date of diagnosis: May 2023  Disease course: Stable  How confident are you filling out medical forms by yourself:: Extremely  Transportation concerns: No  Other concerns:: None  Cultural Influences/Ethnic Background:  Choose not to answer    Diabetes Symptoms & Complications:  Symptom course: Stable  Weight trend: Stable  Complications assessed today?: No    Patient Problem List and Family Medical History reviewed for relevant medical history, current medical status, and diabetes risk factors.    Vitals:  LMP  (LMP Unknown)   Estimated body mass index is 26.42 kg/m  as calculated from the following:    Height as of 5/9/23: 1.61 m (5' 3.39\").    Weight as of 5/9/23: 68.5 kg (151 lb).   Last 3 BP:   BP Readings from Last 3 Encounters:   05/09/23 132/62   11/15/22 139/63   08/24/22 120/74       History   Smoking Status     Former     Types: Cigarettes     Quit date: 10/1/1981   Smokeless Tobacco     Never       Labs:  Lab Results   Component Value Date    A1C 6.4 05/09/2023    A1C 6.0 12/19/2019     Lab Results   Component Value Date     05/09/2023     05/10/2022     06/07/2021     Lab Results   Component Value Date    LDL 95 05/09/2023    LDL 97 12/19/2019     HDL Cholesterol   Date Value Ref Range Status   12/19/2019 70 >49 mg/dL Final     Direct Measure HDL   Date Value Ref Range Status   05/09/2023 81 >=50 mg/dL Final   ]  GFR Estimate   Date Value Ref Range Status   05/09/2023 73 >60 mL/min/1.73m2 Final     Comment:     eGFR calculated using 2021 CKD-EPI equation.   06/07/2021 77 >60 mL/min/[1.73_m2] Final     Comment:     Non  GFR Calc  Starting 12/18/2018, serum creatinine based estimated GFR (eGFR) will be   calculated using the Chronic Kidney Disease Epidemiology Collaboration   (CKD-EPI) equation.       GFR Estimate If Black   Date Value Ref Range " Status   06/07/2021 89 >60 mL/min/[1.73_m2] Final     Comment:      GFR Calc  Starting 12/18/2018, serum creatinine based estimated GFR (eGFR) will be   calculated using the Chronic Kidney Disease Epidemiology Collaboration   (CKD-EPI) equation.       Lab Results   Component Value Date    CR 0.80 05/09/2023    CR 0.73 06/07/2021     No results found for: MICROALBUMIN    Healthy Eating:  Healthy Eating Assessed Today: Yes  Cultural/Mandaeism diet restrictions?: No  Meal planning/habits: Avoiding sweets, Smaller portions  Meals include: Breakfast, Lunch, Dinner, Afternoon Snack  Breakfast: 8 AM: 1 egg, 1 toast and decaf coffee OR just coffee OR scrambled eggs with villagran and English muffin, water  Lunch: 1130: 1/2 cup cottage cheese with baby carrots and celery, water  Dinner: 530 PM: walleye with salad, oil and vinegar, glass of wine OR 1 cup potato casserole, wine and water OR 1/2 burger with a couple onion rings  Snacks: 330: popcorn  Other: Weight watchers 5 years ago and lost 20 pounds. I still follow weight watchers meal plan.  Beverages: Water, Alcohol  Has patient met with a dietitian in the past?: No    Being Active:  Being Active Assessed Today: Yes  Exercise:: Yes (water aerobics)  Days per week of moderate to strenuous exercise (like a brisk walk): 3  On average, minutes per day of exercise at this level: 40  Exercise Minutes per Week: 120  Barrier to exercise: Physical limitation (fibromyalgia)    Problem Solving:  Problem Solving Assessed Today: Yes  Is the patient at risk for hypoglycemia?: No    Reducing Risks:  Diabetes Risks: Age over 45 years    Healthy Coping:  Healthy Coping Assessed Today: Yes  Emotional response to diabetes: Ready to learn  Informal Support system:: Family  Stage of change: ACTION (Actively working towards change)  Support resources: Websites  Patient Activation Measure Survey Score:       View : No data to display.              Currently Laura is not on any  diabetes medication    Care Plan and Education Provided:  Care Plan: Diabetes   Updates made by Hillary Peoples since 5/30/2023 12:00 AM      Problem: HbA1C Not In Goal       Goal: Establish Regular Follow-Ups with PCP       Task: Discuss with PCP the recommended timing for patient's next follow up visit(s)    Responsible User: Hillary Peoples      Task: Discuss schedule for PCP visits with patient    Responsible User: Hillary Peoples      Goal: Get HbA1C Level in Goal       Task: Educate patient on diabetes education self-management topics    Responsible User: Hillary Peoples      Task: Educate patient on benefits of regular glucose monitoring    Responsible User: Hillary Peoples      Task: Refer patient to appropriate extended care team member, as needed (Medication Therapy Management, Behavioral Health, Physical Therapy, etc.)    Responsible User: Hillary Peoples      Task: Discuss diabetes treatment plan with patient    Responsible User: Hillary Peoples      Problem: Diabetes Self-Management Education Needed to Optimize Self-Care Behaviors       Goal: Understand diabetes pathophysiology and disease progression       Task: Provide education on diabetes pathophysiology and disease progression specfic to patient's diabetes type Completed 5/30/2023   Responsible User: Hillary Peoples      Goal: Healthy Eating - follow a healthy eating pattern for diabetes    Start Date: 5/30/2023   This Visit's Progress: 0%   Note:    Use the plate method for 2 meals/day at least 5 days/week for the next 4 weeks.        Task: Provide education on portion control and consistency in amount, composition and timing of food intake Completed 5/30/2023   Responsible User: Hillary Peoples      Task: Provide education on managing carbohydrate intake (carbohydrate counting, plate planning method, etc.) Completed 5/30/2023   Responsible User: Hillary Peoples      Task: Provide education on weight management    Responsible User:  Hillary Peoples      Task: Provide education on heart healthy eating    Responsible User: Hillary Peoples      Task: Provide education on eating out    Responsible User: Hillary Peoples      Task: Develop individualized healthy eating plan with patient    Responsible User: Hillary Peoples      Goal: Being Active - get regular physical activity, working up to at least 150 minutes per week       Task: Provide education on relationship of activity to glucose and precautions to take if at risk for low glucose Completed 5/30/2023   Responsible User: Hillary Peoples      Task: Discuss barriers to physical activity with patient    Responsible User: Hillary Peoples      Task: Develop physical activity plan with patient    Responsible User: Hillary Peoples      Task: Explore community resources including walking groups, assistance programs, and home videos    Responsible User: Hillary Peoples      Goal: Monitoring - monitor glucose and ketones as directed       Task: Provide education on blood glucose monitoring (purpose, proper technique, frequency, glucose targets, interpreting results, when to use glucose control solution, sharps disposal)    Responsible User: Hillary Peoples      Task: Provide education on continuous glucose monitoring (sensor placement, use of dior or /reader, understanding glucose trends, alerts and alarms, differences between sensor glucose and blood glucose)    Responsible User: Hillary Peoples      Task: Provide education on ketone monitoring (when to monitor, frequency, etc.)    Responsible User: Hillary Peoples      Goal: Taking Medication - patient is consistently taking medications as directed       Task: Provide education on action of prescribed medication, including when to take and possible side effects    Responsible User: Hillary Peoples      Task: Provide education on insulin and injectable diabetes medications, including administration, storage, site selection and  rotation for injection sites    Responsible User: Hillary Peoples      Task: Discuss barriers to medication adherence with patient and provide management technique ideas as appropriate    Responsible User: Hillary Peoples      Task: Provide education on frequency and refill details of medications    Responsible User: Hillary Peoples      Goal: Problem Solving - know how to prevent and manage short-term diabetes complications       Task: Provide education on high blood glucose - causes, signs/symptoms, prevention and treatment Completed 5/30/2023   Responsible User: Hillary Peoples      Task: Provide education on low blood glucose - causes, signs/symptoms, prevention, treatment, carrying a carbohydrate source at all times, and medical identification    Responsible User: Hillary Peoples      Task: Provide education on safe travel with diabetes    Responsible User: Hillary Peoples      Task: Provide education on how to care for diabetes on sick days    Responsible User: Hillary Peoples      Task: Provide education on when to call a health care provider    Responsible User: Hillary Peoples      Goal: Reducing Risks - know how to prevent and treat long-term diabetes complications       Task: Provide education on major complications of diabetes, prevention, early diagnostic measures and treatment of complications    Responsible User: Hillary Peoples      Task: Provide education on recommended care for dental, eye and foot health    Responsible User: Hillary Peoples      Task: Provide education on Hemoglobin A1c - goals and relationship to blood glucose levels Completed 5/30/2023   Responsible User: Hillary Peoples      Task: Provide education on recommendations for heart health - lipid levels and goals, blood pressure and goals, and aspirin therapy, if indicated    Responsible User: Hillary Peoples      Task: Provide education on tobacco cessation    Responsible User: Hillary Peoples      Goal: Healthy Coping  - use available resources to cope with the challenges of managing diabetes       Task: Discuss recognizing feelings about having diabetes Completed 5/30/2023   Responsible User: Hillary Peoples      Task: Provide education on the benefits of making appropriate lifestyle changes Completed 5/30/2023   Responsible User: Hillary Peoples      Task: Provide education on benefits of utilizing support systems    Responsible User: Hillary Peoples      Task: Discuss methods for coping with stress    Responsible User: Hillary Peoples      Task: Provide education on when to seek professional counseling    Responsible User: Hillary Peoples RDN, [facility], [facility]    Time Spent: 60 minutes  Encounter Type: Individual    Any diabetes medication dose changes were made via the CDE Protocol per the patient's referring provider. A copy of this encounter was shared with the provider.

## 2023-05-30 NOTE — LETTER
"    5/30/2023         RE: Nubia Coronel  [address] [id number]        Dear Colleague,    Thank you for referring your patient, Nubia Coronel, to the [facility]. Please see a copy of my visit note below.    Diabetes Self-Management Education & Support    Presents for: Individual review    Type of Service: In Person Visit    ASSESSMENT:  Laura is here to learn more about diabetes. She is questioning the results of her lab tests, both glucose and A1c.   Discussed how they are used to diagnose diabetes.     Discussed healthy eating for diabetes including plate planning method and basics of carbohydrate counting. Per recall below, she is restricting carbohydrates at meals.  Positive reinforcement given for the exercise she is doing.    Laura states \"I guess I would rather check my A1c in 3 months than test my blood sugar with a meter.\" She has C/O fibromyalgia pain.       Patient's most recent   Lab Results   Component Value Date    A1C 6.4 05/09/2023    A1C 6.0 12/19/2019     is meeting goal of <7.0    Diabetes knowledge and skills assessment:   Patient is knowledgeable in diabetes management concepts related to: Being Active and Problem Solving    Continue education with the following diabetes management concepts: Healthy Eating and Reducing Risks    Based on learning assessment above, most appropriate setting for further diabetes education would be: Individual    PLAN    Meal Plan Recommendation:   Use portion control and plate planning method.  Carbohydrates to aim for at meals: 30 grams and snacks: 0-15 grams.  Use [employer]cue[employer] for recipe and meal planning ideas  Use Anulex dior for carbohydrate reference.    Exercise / activity plan:   Recommend starting slow and increasing as tolerated to goal of 150 minutes per week.     Topics to cover at upcoming visits: Healthy Eating and Reducing Risks    Follow-up: 7/11/23- reassess if BG testing is " "something she would like to do at this visit.     See Care Plan for co-developed, patient-state behavior change goals.  AVS provided for patient today.    Education Materials Provided:  My Plate Planner    SUBJECTIVE/OBJECTIVE:  Presents for: Individual review  Accompanied by: Self  Diabetes education in the past 24mo: No  Focus of Visit: Healthy Eating  Diabetes type: Type 2  Date of diagnosis: May 2023  Disease course: Stable  How confident are you filling out medical forms by yourself:: Extremely  Transportation concerns: No  Other concerns:: None  Cultural Influences/Ethnic Background:  Choose not to answer    Diabetes Symptoms & Complications:  Symptom course: Stable  Weight trend: Stable  Complications assessed today?: No    Patient Problem List and Family Medical History reviewed for relevant medical history, current medical status, and diabetes risk factors.    Vitals:  LMP  (LMP Unknown)   Estimated body mass index is 26.42 kg/m  as calculated from the following:    Height as of 5/9/23: 1.61 m (5' 3.39\").    Weight as of 5/9/23: 68.5 kg (151 lb).   Last 3 BP:   BP Readings from Last 3 Encounters:   05/09/23 132/62   11/15/22 139/63   08/24/22 120/74       History   Smoking Status     Former     Types: Cigarettes     Quit date: 10/1/1981   Smokeless Tobacco     Never       Labs:  Lab Results   Component Value Date    A1C 6.4 05/09/2023    A1C 6.0 12/19/2019     Lab Results   Component Value Date     05/09/2023     05/10/2022     06/07/2021     Lab Results   Component Value Date    LDL 95 05/09/2023    LDL 97 12/19/2019     HDL Cholesterol   Date Value Ref Range Status   12/19/2019 70 >49 mg/dL Final     Direct Measure HDL   Date Value Ref Range Status   05/09/2023 81 >=50 mg/dL Final   ]  GFR Estimate   Date Value Ref Range Status   05/09/2023 73 >60 mL/min/1.73m2 Final     Comment:     eGFR calculated using 2021 CKD-EPI equation.   06/07/2021 77 >60 mL/min/[1.73_m2] Final     Comment: "     Non  GFR Calc  Starting 12/18/2018, serum creatinine based estimated GFR (eGFR) will be   calculated using the Chronic Kidney Disease Epidemiology Collaboration   (CKD-EPI) equation.       GFR Estimate If Black   Date Value Ref Range Status   06/07/2021 89 >60 mL/min/[1.73_m2] Final     Comment:      GFR Calc  Starting 12/18/2018, serum creatinine based estimated GFR (eGFR) will be   calculated using the Chronic Kidney Disease Epidemiology Collaboration   (CKD-EPI) equation.       Lab Results   Component Value Date    CR 0.80 05/09/2023    CR 0.73 06/07/2021     No results found for: MICROALBUMIN    Healthy Eating:  Healthy Eating Assessed Today: Yes  Cultural/Yazdanism diet restrictions?: No  Meal planning/habits: Avoiding sweets, Smaller portions  Meals include: Breakfast, Lunch, Dinner, Afternoon Snack  Breakfast: 8 AM: 1 egg, 1 toast and decaf coffee OR just coffee OR scrambled eggs with villagran and English muffin, water  Lunch: 1130: 1/2 cup cottage cheese with baby carrots and celery, water  Dinner: 530 PM: walleye with salad, oil and vinegar, glass of wine OR 1 cup potato casserole, wine and water OR 1/2 burger with a couple onion rings  Snacks: 330: popcorn  Other: Weight watchers 5 years ago and lost 20 pounds. I still follow weight watchers meal plan.  Beverages: Water, Alcohol  Has patient met with a dietitian in the past?: No    Being Active:  Being Active Assessed Today: Yes  Exercise:: Yes (water aerobics)  Days per week of moderate to strenuous exercise (like a brisk walk): 3  On average, minutes per day of exercise at this level: 40  Exercise Minutes per Week: 120  Barrier to exercise: Physical limitation (fibromyalgia)    Problem Solving:  Problem Solving Assessed Today: Yes  Is the patient at risk for hypoglycemia?: No    Reducing Risks:  Diabetes Risks: Age over 45 years    Healthy Coping:  Healthy Coping Assessed Today: Yes  Emotional response to diabetes: Ready  to learn  Informal Support system:: Family  Stage of change: ACTION (Actively working towards change)  Support resources: Websites  Patient Activation Measure Survey Score:       View : No data to display.              Currently Laura is not on any diabetes medication    Care Plan and Education Provided:  Care Plan: Diabetes   Updates made by Hillary Peoples since 5/30/2023 12:00 AM        Problem: HbA1C Not In Goal         Goal: Establish Regular Follow-Ups with PCP         Task: Discuss with PCP the recommended timing for patient's next follow up visit(s)    Responsible User: Hillary Peoples        Task: Discuss schedule for PCP visits with patient    Responsible User: Hillary Peoples        Goal: Get HbA1C Level in Goal         Task: Educate patient on diabetes education self-management topics    Responsible User: Hillary Peoples        Task: Educate patient on benefits of regular glucose monitoring    Responsible User: Hillary Peoples        Task: Refer patient to appropriate extended care team member, as needed (Medication Therapy Management, Behavioral Health, Physical Therapy, etc.)    Responsible User: Hillary Peoples        Task: Discuss diabetes treatment plan with patient    Responsible User: Hillary Peoples        Problem: Diabetes Self-Management Education Needed to Optimize Self-Care Behaviors         Goal: Understand diabetes pathophysiology and disease progression         Task: Provide education on diabetes pathophysiology and disease progression specfic to patient's diabetes type Completed 5/30/2023   Responsible User: Hillary Peoples        Goal: Healthy Eating - follow a healthy eating pattern for diabetes    Start Date: 5/30/2023   This Visit's Progress: 0%   Note:    Use the plate method for 2 meals/day at least 5 days/week for the next 4 weeks.          Task: Provide education on portion control and consistency in amount, composition and timing of food intake Completed 5/30/2023    Responsible User: Hillary Peoples        Task: Provide education on managing carbohydrate intake (carbohydrate counting, plate planning method, etc.) Completed 5/30/2023   Responsible User: Hillary Peoples        Task: Provide education on weight management    Responsible User: Hillary Peoples        Task: Provide education on heart healthy eating    Responsible User: Hillary Peoples        Task: Provide education on eating out    Responsible User: Hillary Peoples        Task: Develop individualized healthy eating plan with patient    Responsible User: Hillary Peoples        Goal: Being Active - get regular physical activity, working up to at least 150 minutes per week         Task: Provide education on relationship of activity to glucose and precautions to take if at risk for low glucose Completed 5/30/2023   Responsible User: Hillary Peoples        Task: Discuss barriers to physical activity with patient    Responsible User: Hillary Peoples        Task: Develop physical activity plan with patient    Responsible User: Hillary Peoples        Task: Explore community resources including walking groups, assistance programs, and home videos    Responsible User: Hillary Peoples        Goal: Monitoring - monitor glucose and ketones as directed         Task: Provide education on blood glucose monitoring (purpose, proper technique, frequency, glucose targets, interpreting results, when to use glucose control solution, sharps disposal)    Responsible User: Hillary Peoples        Task: Provide education on continuous glucose monitoring (sensor placement, use of dior or /reader, understanding glucose trends, alerts and alarms, differences between sensor glucose and blood glucose)    Responsible User: Hillary Peoples        Task: Provide education on ketone monitoring (when to monitor, frequency, etc.)    Responsible User: Hillary Peoples        Goal: Taking Medication - patient is consistently taking  medications as directed         Task: Provide education on action of prescribed medication, including when to take and possible side effects    Responsible User: Hillary Peoples        Task: Provide education on insulin and injectable diabetes medications, including administration, storage, site selection and rotation for injection sites    Responsible User: Hillary Peoples        Task: Discuss barriers to medication adherence with patient and provide management technique ideas as appropriate    Responsible User: Hillary Peoples        Task: Provide education on frequency and refill details of medications    Responsible User: Hillary Peoples        Goal: Problem Solving - know how to prevent and manage short-term diabetes complications         Task: Provide education on high blood glucose - causes, signs/symptoms, prevention and treatment Completed 5/30/2023   Responsible User: Hillary Peoples        Task: Provide education on low blood glucose - causes, signs/symptoms, prevention, treatment, carrying a carbohydrate source at all times, and medical identification    Responsible User: Hillary Peoples        Task: Provide education on safe travel with diabetes    Responsible User: Hillary Peoples        Task: Provide education on how to care for diabetes on sick days    Responsible User: Hillary ePoples        Task: Provide education on when to call a health care provider    Responsible User: Hillary Peoples        Goal: Reducing Risks - know how to prevent and treat long-term diabetes complications         Task: Provide education on major complications of diabetes, prevention, early diagnostic measures and treatment of complications    Responsible User: Hillary Peoples        Task: Provide education on recommended care for dental, eye and foot health    Responsible User: Hillary Peoples        Task: Provide education on Hemoglobin A1c - goals and relationship to blood glucose levels Completed 5/30/2023    Responsible User: Hillary Peoples        Task: Provide education on recommendations for heart health - lipid levels and goals, blood pressure and goals, and aspirin therapy, if indicated    Responsible User: Hillary Peoples        Task: Provide education on tobacco cessation    Responsible User: Hillary Peoples        Goal: Healthy Coping - use available resources to cope with the challenges of managing diabetes         Task: Discuss recognizing feelings about having diabetes Completed 5/30/2023   Responsible User: Hillary Peoples        Task: Provide education on the benefits of making appropriate lifestyle changes Completed 5/30/2023   Responsible User: Hillary Peoples        Task: Provide education on benefits of utilizing support systems    Responsible User: Hillary Peoples        Task: Discuss methods for coping with stress    Responsible User: Hillary Peoples        Task: Provide education on when to seek professional counseling    Responsible User: Hillary Peoples RDN, LD, CDCES    Time Spent: 60 minutes  Encounter Type: Individual    Any diabetes medication dose changes were made via the CDE Protocol per the patient's referring provider. A copy of this encounter was shared with the provider.

## 2023-07-11 ENCOUNTER — ALLIED HEALTH/NURSE VISIT (OUTPATIENT)
Dept: EDUCATION SERVICES | Facility: CLINIC | Age: 83
End: 2023-07-11
Payer: MEDICARE

## 2023-07-11 DIAGNOSIS — E11.9 TYPE 2 DIABETES MELLITUS WITHOUT COMPLICATION, WITHOUT LONG-TERM CURRENT USE OF INSULIN (H): Primary | ICD-10-CM

## 2023-07-11 PROCEDURE — G0108 DIAB MANAGE TRN  PER INDIV: HCPCS

## 2023-07-11 NOTE — PROGRESS NOTES
"  Diabetes Self-Management Education & Support    Presents for: Follow-up    Type of Service: In Person Visit    ASSESSMENT:  Laura reports she is \"trying to eat more stable during the day and meals that are balanced with carb and protein at both meals and snacks.\" She continues to go to water aerobics twice per week. \"When we are in [place], I am able to do it more.\"  Positive reinforcement given for managing her healthy lifestyle.     Patient's most recent   Lab Results   Component Value Date    A1C 6.4 05/09/2023    A1C 6.0 12/19/2019     is meeting goal of <7.0    Diabetes knowledge and skills assessment:   Patient is knowledgeable in diabetes management concepts related to: Healthy Eating, Being Active and Problem Solving    Continue education with the following diabetes management concepts: Reducing Risks    Based on learning assessment above, most appropriate setting for further diabetes education would be: Individual setting.      PLAN  No changes today. She prefers to continue to monitor A1c and if it were to rise, will then consider testing BG.   Continue to follow healthy eating meal plan for diabetes.  Continue with exercise program as tolerated.    Follow-up: She will call to schedule as needed    See Care Plan for co-developed, patient-state behavior change goals.    Education Materials Provided:  [employer] Understanding Diabetes Booklet      SUBJECTIVE/OBJECTIVE:  Presents for: Follow-up  Accompanied by: Self  Diabetes education in the past 24mo: No  Focus of Visit: Healthy Eating  Diabetes type: Type 2  Date of diagnosis: May 2023  Disease course: Stable  How confident are you filling out medical forms by yourself:: Extremely  Transportation concerns: No  Other concerns:: None  Cultural Influences/Ethnic Background:  Choose not to answer    Diabetes Symptoms & Complications:  Symptom course: Stable  Weight trend: Stable  Complications assessed today?: No    Patient Problem List and Family " "Medical History reviewed for relevant medical history, current medical status, and diabetes risk factors.    Vitals:  LMP  (LMP Unknown)   Estimated body mass index is 26.42 kg/m  as calculated from the following:    Height as of 5/9/23: 1.61 m (5' 3.39\").    Weight as of 5/9/23: 68.5 kg (151 lb).   Last 3 BP:   BP Readings from Last 3 Encounters:   05/09/23 132/62   11/15/22 139/63   08/24/22 120/74       History   Smoking Status     Former     Types: Cigarettes     Quit date: 10/1/1981   Smokeless Tobacco     Never       Labs:  Lab Results   Component Value Date    A1C 6.4 05/09/2023    A1C 6.0 12/19/2019     Lab Results   Component Value Date     05/09/2023     05/10/2022     06/07/2021     Lab Results   Component Value Date    LDL 95 05/09/2023    LDL 97 12/19/2019     HDL Cholesterol   Date Value Ref Range Status   12/19/2019 70 >49 mg/dL Final     Direct Measure HDL   Date Value Ref Range Status   05/09/2023 81 >=50 mg/dL Final   ]  GFR Estimate   Date Value Ref Range Status   05/09/2023 73 >60 mL/min/1.73m2 Final     Comment:     eGFR calculated using 2021 CKD-EPI equation.   06/07/2021 77 >60 mL/min/[1.73_m2] Final     Comment:     Non  GFR Calc  Starting 12/18/2018, serum creatinine based estimated GFR (eGFR) will be   calculated using the Chronic Kidney Disease Epidemiology Collaboration   (CKD-EPI) equation.       GFR Estimate If Black   Date Value Ref Range Status   06/07/2021 89 >60 mL/min/[1.73_m2] Final     Comment:      GFR Calc  Starting 12/18/2018, serum creatinine based estimated GFR (eGFR) will be   calculated using the Chronic Kidney Disease Epidemiology Collaboration   (CKD-EPI) equation.       Lab Results   Component Value Date    CR 0.80 05/09/2023    CR 0.73 06/07/2021     No results found for: MICROALBUMIN    Healthy Eating:  Healthy Eating Assessed Today: Yes  Cultural/Lutheran diet restrictions?: No  Meal planning/habits: Avoiding " sweets, Smaller portions  How many times a week on average do you eat food made away from home (restaurant/take-out)?: 1  Meals include: Breakfast, Lunch, Dinner, Afternoon Snack  Breakfast: 8 AM: 1 egg, 1 toast and decaf coffee OR just coffee OR scrambled eggs with villagran and English muffin, water  Lunch: 1130: 1/2 cup cottage cheese with baby carrots and celery, water  Dinner: 530 PM: walleye with salad, oil and vinegar, glass of wine OR 1 cup potato casserole, wine and water OR 1/2 burger with a couple onion rings  Snacks: 330: popcorn  Other: Weight watchers 5 years ago and lost 20 pounds. I still follow weight watchers meal plan. Laura loves to cook.  Beverages: Water, Alcohol  Has patient met with a dietitian in the past?: No    Being Active:  Being Active Assessed Today: Yes  Exercise:: Yes (water aerobics)  Days per week of moderate to strenuous exercise (like a brisk walk): 3  On average, minutes per day of exercise at this level: 40  Exercise Minutes per Week: 120  Barrier to exercise: Physical limitation (fibromyalgia)      Problem Solving:  Problem Solving Assessed Today: Yes  Is the patient at risk for hypoglycemia?: No    Reducing Risks:  Diabetes Risks: Age over 45 years    Healthy Coping:  Healthy Coping Assessed Today: Yes  Emotional response to diabetes: Ready to learn  Informal Support system:: Family  Stage of change: ACTION (Actively working towards change)  Support resources: Websites  Patient Activation Measure Survey Score:       No data to display                Care Plan and Education Provided:  Care Plan: Diabetes   Updates made by Hillary Peoples since 7/11/2023 12:00 AM      Problem: Diabetes Self-Management Education Needed to Optimize Self-Care Behaviors       Goal: Healthy Eating - follow a healthy eating pattern for diabetes    Start Date: 5/30/2023   This Visit's Progress: 90%   Recent Progress: 0%   Note:    Use the plate method for 2 meals/day at least 5 days/week for the next 4  weeks.        Goal: Being Active - get regular physical activity, working up to at least 150 minutes per week       Task: Discuss barriers to physical activity with patient Completed 7/11/2023   Responsible User: Hillary Peoples      Task: Explore community resources including walking groups, assistance programs, and home videos Completed 7/11/2023   Responsible User: Hillary Peoples      Goal: Problem Solving - know how to prevent and manage short-term diabetes complications       Task: Provide education on safe travel with diabetes Completed 7/11/2023   Responsible User: Hillary Peoples      Task: Provide education on how to care for diabetes on sick days Completed 7/11/2023   Responsible User: Hillary Peoples      Task: Provide education on when to call a health care provider Completed 7/11/2023   Responsible User: Hillary Peoples      Goal: Reducing Risks - know how to prevent and treat long-term diabetes complications       Task: Provide education on recommended care for dental, eye and foot health Completed 7/11/2023   Responsible User: Hillary Peoples      Goal: Healthy Coping - use available resources to cope with the challenges of managing diabetes       Task: Provide education on benefits of utilizing support systems Completed 7/11/2023   Responsible User: Hillary Peoples RDN, JOSEF, [facility]    Time Spent: 40 minutes  Encounter Type: Individual    Any diabetes medication dose changes were made via the CDE Protocol per the patient's referring provider. A copy of this encounter was shared with the provider.

## 2023-07-11 NOTE — LETTER
"    7/11/2023         RE: Nubia Coronel  [id number] [facility] [id number]        Dear Colleague,    Thank you for referring your patient, Nubia Coronel, to the [facility]. Please see a copy of my visit note below.      Diabetes Self-Management Education & Support    Presents for: Follow-up    Type of Service: In Person Visit    ASSESSMENT:  Laura reports she is \"trying to eat more stable during the day and meals that are balanced with carb and protein at both meals and snacks.\" She continues to go to water aerobics twice per week. \"When we are in [place], I am able to do it more.\"  Positive reinforcement given for managing her healthy lifestyle.     Patient's most recent   Lab Results   Component Value Date    A1C 6.4 05/09/2023    A1C 6.0 12/19/2019     is meeting goal of <7.0    Diabetes knowledge and skills assessment:   Patient is knowledgeable in diabetes management concepts related to: Healthy Eating, Being Active and Problem Solving    Continue education with the following diabetes management concepts: Reducing Risks    Based on learning assessment above, most appropriate setting for further diabetes education would be: Individual setting.      PLAN  No changes today. She prefers to continue to monitor A1c and if it were to rise, will then consider testing BG.   Continue to follow healthy eating meal plan for diabetes.  Continue with exercise program as tolerated.    Follow-up: She will call to schedule as needed    See Care Plan for co-developed, patient-state behavior change goals.    Education Materials Provided:  [facility] Understanding Diabetes Booklet      SUBJECTIVE/OBJECTIVE:  Presents for: Follow-up  Accompanied by: Self  Diabetes education in the past 24mo: No  Focus of Visit: Healthy Eating  Diabetes type: Type 2  Date of diagnosis: May 2023  Disease course: Stable  How confident are you filling out medical forms by yourself:: " "Extremely  Transportation concerns: No  Other concerns:: None  Cultural Influences/Ethnic Background:  Choose not to answer    Diabetes Symptoms & Complications:  Symptom course: Stable  Weight trend: Stable  Complications assessed today?: No    Patient Problem List and Family Medical History reviewed for relevant medical history, current medical status, and diabetes risk factors.    Vitals:  LMP  (LMP Unknown)   Estimated body mass index is 26.42 kg/m  as calculated from the following:    Height as of 5/9/23: 1.61 m (5' 3.39\").    Weight as of 5/9/23: 68.5 kg (151 lb).   Last 3 BP:   BP Readings from Last 3 Encounters:   05/09/23 132/62   11/15/22 139/63   08/24/22 120/74       History   Smoking Status     Former     Types: Cigarettes     Quit date: 10/1/1981   Smokeless Tobacco     Never       Labs:  Lab Results   Component Value Date    A1C 6.4 05/09/2023    A1C 6.0 12/19/2019     Lab Results   Component Value Date     05/09/2023     05/10/2022     06/07/2021     Lab Results   Component Value Date    LDL 95 05/09/2023    LDL 97 12/19/2019     HDL Cholesterol   Date Value Ref Range Status   12/19/2019 70 >49 mg/dL Final     Direct Measure HDL   Date Value Ref Range Status   05/09/2023 81 >=50 mg/dL Final   ]  GFR Estimate   Date Value Ref Range Status   05/09/2023 73 >60 mL/min/1.73m2 Final     Comment:     eGFR calculated using 2021 CKD-EPI equation.   06/07/2021 77 >60 mL/min/[1.73_m2] Final     Comment:     Non  GFR Calc  Starting 12/18/2018, serum creatinine based estimated GFR (eGFR) will be   calculated using the Chronic Kidney Disease Epidemiology Collaboration   (CKD-EPI) equation.       GFR Estimate If Black   Date Value Ref Range Status   06/07/2021 89 >60 mL/min/[1.73_m2] Final     Comment:      GFR Calc  Starting 12/18/2018, serum creatinine based estimated GFR (eGFR) will be   calculated using the Chronic Kidney Disease Epidemiology Collaboration "   (CKD-EPI) equation.       Lab Results   Component Value Date    CR 0.80 05/09/2023    CR 0.73 06/07/2021     No results found for: MICROALBUMIN    Healthy Eating:  Healthy Eating Assessed Today: Yes  Cultural/Christian diet restrictions?: No  Meal planning/habits: Avoiding sweets, Smaller portions  How many times a week on average do you eat food made away from home (restaurant/take-out)?: 1  Meals include: Breakfast, Lunch, Dinner, Afternoon Snack  Breakfast: 8 AM: 1 egg, 1 toast and decaf coffee OR just coffee OR scrambled eggs with villagran and English muffin, water  Lunch: 1130: 1/2 cup cottage cheese with baby carrots and celery, water  Dinner: 530 PM: walleye with salad, oil and vinegar, glass of wine OR 1 cup potato casserole, wine and water OR 1/2 burger with a couple onion rings  Snacks: 330: popcorn  Other: Weight watchers 5 years ago and lost 20 pounds. I still follow weight watchers meal plan. Laura loves to cook.  Beverages: Water, Alcohol  Has patient met with a dietitian in the past?: No    Being Active:  Being Active Assessed Today: Yes  Exercise:: Yes (water aerobics)  Days per week of moderate to strenuous exercise (like a brisk walk): 3  On average, minutes per day of exercise at this level: 40  Exercise Minutes per Week: 120  Barrier to exercise: Physical limitation (fibromyalgia)      Problem Solving:  Problem Solving Assessed Today: Yes  Is the patient at risk for hypoglycemia?: No    Reducing Risks:  Diabetes Risks: Age over 45 years    Healthy Coping:  Healthy Coping Assessed Today: Yes  Emotional response to diabetes: Ready to learn  Informal Support system:: Family  Stage of change: ACTION (Actively working towards change)  Support resources: Websites  Patient Activation Measure Survey Score:       No data to display                Care Plan and Education Provided:  Care Plan: Diabetes   Updates made by Hillary Peoples since 7/11/2023 12:00 AM        Problem: Diabetes Self-Management  Education Needed to Optimize Self-Care Behaviors         Goal: Healthy Eating - follow a healthy eating pattern for diabetes    Start Date: 5/30/2023   This Visit's Progress: 90%   Recent Progress: 0%   Note:    Use the plate method for 2 meals/day at least 5 days/week for the next 4 weeks.          Goal: Being Active - get regular physical activity, working up to at least 150 minutes per week         Task: Discuss barriers to physical activity with patient Completed 7/11/2023   Responsible User: Hillary Peoples        Task: Explore community resources including walking groups, assistance programs, and home videos Completed 7/11/2023   Responsible User: Hillary Peoples        Goal: Problem Solving - know how to prevent and manage short-term diabetes complications         Task: Provide education on safe travel with diabetes Completed 7/11/2023   Responsible User: Hillary Peoples        Task: Provide education on how to care for diabetes on sick days Completed 7/11/2023   Responsible User: Hillary Peoples        Task: Provide education on when to call a health care provider Completed 7/11/2023   Responsible User: Hillary Peoples        Goal: Reducing Risks - know how to prevent and treat long-term diabetes complications         Task: Provide education on recommended care for dental, eye and foot health Completed 7/11/2023   Responsible User: Hillary Peoples        Goal: Healthy Coping - use available resources to cope with the challenges of managing diabetes         Task: Provide education on benefits of utilizing support systems Completed 7/11/2023   Responsible User: Hillary Peoples RDN, LD, [facility]    Time Spent: 40 minutes  Encounter Type: Individual    Any diabetes medication dose changes were made via the CDE Protocol per the patient's referring provider. A copy of this encounter was shared with the provider.

## 2023-08-15 ENCOUNTER — HOSPITAL ENCOUNTER (EMERGENCY)
Facility: CLINIC | Age: 83
Discharge: HOME | End: 2023-08-15
Attending: STUDENT IN AN ORGANIZED HEALTH CARE EDUCATION/TRAINING PROGRAM | Admitting: STUDENT IN AN ORGANIZED HEALTH CARE EDUCATION/TRAINING PROGRAM
Payer: MEDICARE

## 2023-08-15 ENCOUNTER — APPOINTMENT (OUTPATIENT)
Dept: CT IMAGING | Facility: CLINIC | Age: 83
End: 2023-08-15
Attending: STUDENT IN AN ORGANIZED HEALTH CARE EDUCATION/TRAINING PROGRAM
Payer: MEDICARE

## 2023-08-15 ENCOUNTER — TELEPHONE (OUTPATIENT)
Dept: INTERNAL MEDICINE | Facility: CLINIC | Age: 83
End: 2023-08-15

## 2023-08-15 VITALS
HEART RATE: 73 BPM | BODY MASS INDEX: 25.1 KG/M2 | DIASTOLIC BLOOD PRESSURE: 65 MMHG | TEMPERATURE: 97.6 F | WEIGHT: 147 LBS | HEIGHT: 64 IN | OXYGEN SATURATION: 100 % | SYSTOLIC BLOOD PRESSURE: 134 MMHG | RESPIRATION RATE: 18 BRPM

## 2023-08-15 DIAGNOSIS — E87.6 HYPOKALEMIA: ICD-10-CM

## 2023-08-15 DIAGNOSIS — N20.1 RIGHT URETERAL STONE: ICD-10-CM

## 2023-08-15 DIAGNOSIS — K63.9 COLONIC THICKENING: ICD-10-CM

## 2023-08-15 DIAGNOSIS — K63.9 COLON WALL THICKENING: Primary | ICD-10-CM

## 2023-08-15 LAB
ALBUMIN SERPL BCG-MCNC: 4.3 G/DL (ref 3.5–5.2)
ALBUMIN UR-MCNC: 10 MG/DL
ALP SERPL-CCNC: 89 U/L (ref 35–104)
ALT SERPL W P-5'-P-CCNC: 60 U/L (ref 0–50)
AMORPH CRY #/AREA URNS HPF: ABNORMAL /HPF
ANION GAP SERPL CALCULATED.3IONS-SCNC: 12 MMOL/L (ref 7–15)
APPEARANCE UR: CLEAR
AST SERPL W P-5'-P-CCNC: 44 U/L (ref 0–45)
BACTERIA #/AREA URNS HPF: ABNORMAL /HPF
BASOPHILS # BLD AUTO: 0.1 10E3/UL (ref 0–0.2)
BASOPHILS NFR BLD AUTO: 1 %
BILIRUB SERPL-MCNC: 0.4 MG/DL
BILIRUB UR QL STRIP: NEGATIVE
BUN SERPL-MCNC: 12.6 MG/DL (ref 8–23)
CALCIUM SERPL-MCNC: 9.8 MG/DL (ref 8.8–10.2)
CHLORIDE SERPL-SCNC: 99 MMOL/L (ref 98–107)
COLOR UR AUTO: YELLOW
CREAT SERPL-MCNC: 0.84 MG/DL (ref 0.51–0.95)
DEPRECATED HCO3 PLAS-SCNC: 29 MMOL/L (ref 22–29)
EOSINOPHIL # BLD AUTO: 0.1 10E3/UL (ref 0–0.7)
EOSINOPHIL NFR BLD AUTO: 1 %
ERYTHROCYTE [DISTWIDTH] IN BLOOD BY AUTOMATED COUNT: 12.9 % (ref 10–15)
GFR SERPL CREATININE-BSD FRML MDRD: 69 ML/MIN/1.73M2
GLUCOSE SERPL-MCNC: 176 MG/DL (ref 70–99)
GLUCOSE UR STRIP-MCNC: NEGATIVE MG/DL
HCT VFR BLD AUTO: 45.6 % (ref 35–47)
HGB BLD-MCNC: 15.3 G/DL (ref 11.7–15.7)
HGB UR QL STRIP: ABNORMAL
HOLD SPECIMEN: NORMAL
HOLD SPECIMEN: NORMAL
IMM GRANULOCYTES # BLD: 0 10E3/UL
IMM GRANULOCYTES NFR BLD: 0 %
KETONES UR STRIP-MCNC: NEGATIVE MG/DL
LEUKOCYTE ESTERASE UR QL STRIP: ABNORMAL
LYMPHOCYTES # BLD AUTO: 2 10E3/UL (ref 0.8–5.3)
LYMPHOCYTES NFR BLD AUTO: 22 %
MCH RBC QN AUTO: 33.6 PG (ref 26.5–33)
MCHC RBC AUTO-ENTMCNC: 33.6 G/DL (ref 31.5–36.5)
MCV RBC AUTO: 100 FL (ref 78–100)
MONOCYTES # BLD AUTO: 0.6 10E3/UL (ref 0–1.3)
MONOCYTES NFR BLD AUTO: 7 %
NEUTROPHILS # BLD AUTO: 6.4 10E3/UL (ref 1.6–8.3)
NEUTROPHILS NFR BLD AUTO: 69 %
NITRATE UR QL: NEGATIVE
NRBC # BLD AUTO: 0 10E3/UL
NRBC BLD AUTO-RTO: 0 /100
PH UR STRIP: 6.5 [PH] (ref 5–7)
PLATELET # BLD AUTO: 253 10E3/UL (ref 150–450)
POTASSIUM SERPL-SCNC: 3.2 MMOL/L (ref 3.4–5.3)
PROT SERPL-MCNC: 6.9 G/DL (ref 6.4–8.3)
RADIOLOGIST FLAGS: NORMAL
RBC # BLD AUTO: 4.56 10E6/UL (ref 3.8–5.2)
RBC URINE: >182 /HPF
SODIUM SERPL-SCNC: 140 MMOL/L (ref 136–145)
SP GR UR STRIP: 1.02 (ref 1–1.03)
SQUAMOUS EPITHELIAL: <1 /HPF
UROBILINOGEN UR STRIP-MCNC: NORMAL MG/DL
WBC # BLD AUTO: 9.2 10E3/UL (ref 4–11)
WBC URINE: 7 /HPF

## 2023-08-15 PROCEDURE — 81001 URINALYSIS AUTO W/SCOPE: CPT | Performed by: STUDENT IN AN ORGANIZED HEALTH CARE EDUCATION/TRAINING PROGRAM

## 2023-08-15 PROCEDURE — 258N000003 HC RX IP 258 OP 636: Performed by: STUDENT IN AN ORGANIZED HEALTH CARE EDUCATION/TRAINING PROGRAM

## 2023-08-15 PROCEDURE — G1010 CDSM STANSON: HCPCS

## 2023-08-15 PROCEDURE — 96361 HYDRATE IV INFUSION ADD-ON: CPT

## 2023-08-15 PROCEDURE — 96374 THER/PROPH/DIAG INJ IV PUSH: CPT

## 2023-08-15 PROCEDURE — 80053 COMPREHEN METABOLIC PANEL: CPT | Performed by: STUDENT IN AN ORGANIZED HEALTH CARE EDUCATION/TRAINING PROGRAM

## 2023-08-15 PROCEDURE — 85025 COMPLETE CBC W/AUTO DIFF WBC: CPT | Performed by: STUDENT IN AN ORGANIZED HEALTH CARE EDUCATION/TRAINING PROGRAM

## 2023-08-15 PROCEDURE — 36415 COLL VENOUS BLD VENIPUNCTURE: CPT | Performed by: STUDENT IN AN ORGANIZED HEALTH CARE EDUCATION/TRAINING PROGRAM

## 2023-08-15 PROCEDURE — 250N000011 HC RX IP 250 OP 636: Mod: JZ | Performed by: STUDENT IN AN ORGANIZED HEALTH CARE EDUCATION/TRAINING PROGRAM

## 2023-08-15 PROCEDURE — 250N000013 HC RX MED GY IP 250 OP 250 PS 637: Performed by: STUDENT IN AN ORGANIZED HEALTH CARE EDUCATION/TRAINING PROGRAM

## 2023-08-15 PROCEDURE — 99285 EMERGENCY DEPT VISIT HI MDM: CPT | Mod: 25

## 2023-08-15 RX ORDER — ONDANSETRON 2 MG/ML
4 INJECTION INTRAMUSCULAR; INTRAVENOUS EVERY 30 MIN PRN
Status: DISCONTINUED | OUTPATIENT
Start: 2023-08-15 | End: 2023-08-15 | Stop reason: HOSPADM

## 2023-08-15 RX ORDER — TAMSULOSIN HYDROCHLORIDE 0.4 MG/1
0.4 CAPSULE ORAL DAILY
Qty: 7 CAPSULE | Refills: 0 | Status: SHIPPED | OUTPATIENT
Start: 2023-08-15 | End: 2023-08-22

## 2023-08-15 RX ORDER — POTASSIUM CHLORIDE 1500 MG/1
20 TABLET, EXTENDED RELEASE ORAL ONCE
Status: COMPLETED | OUTPATIENT
Start: 2023-08-15 | End: 2023-08-15

## 2023-08-15 RX ORDER — ONDANSETRON 4 MG/1
4 TABLET, ORALLY DISINTEGRATING ORAL EVERY 8 HOURS PRN
Qty: 10 TABLET | Refills: 0 | Status: SHIPPED | OUTPATIENT
Start: 2023-08-15 | End: 2023-08-18

## 2023-08-15 RX ORDER — MORPHINE SULFATE 4 MG/ML
4 INJECTION, SOLUTION INTRAMUSCULAR; INTRAVENOUS
Status: DISCONTINUED | OUTPATIENT
Start: 2023-08-15 | End: 2023-08-15 | Stop reason: HOSPADM

## 2023-08-15 RX ORDER — KETOROLAC TROMETHAMINE 15 MG/ML
10 INJECTION, SOLUTION INTRAMUSCULAR; INTRAVENOUS ONCE
Status: COMPLETED | OUTPATIENT
Start: 2023-08-15 | End: 2023-08-15

## 2023-08-15 RX ADMIN — KETOROLAC TROMETHAMINE 10 MG: 15 INJECTION, SOLUTION INTRAMUSCULAR; INTRAVENOUS at 11:29

## 2023-08-15 RX ADMIN — POTASSIUM CHLORIDE 20 MEQ: 1500 TABLET, EXTENDED RELEASE ORAL at 13:12

## 2023-08-15 RX ADMIN — SODIUM CHLORIDE 1000 ML: 9 INJECTION, SOLUTION INTRAVENOUS at 11:21

## 2023-08-15 ASSESSMENT — ACTIVITIES OF DAILY LIVING (ADL)
ADLS_ACUITY_SCORE: 35
ADLS_ACUITY_SCORE: 35

## 2023-08-15 NOTE — DISCHARGE INSTRUCTIONS
Like we discussed, your CT scan today showed a kidney stone, which is likely causing your pain.  Your CT scan also showed thickening of your colon (large intestine) on the right side.  You need to have a colonoscopy to rule out colon cancer.    Discharge Instructions  Kidney Stones    Kidney stones are a common problem that can cause a lot of pain but fortunately are usually not dangerous. Kidney stones form in the kidney and then can cause a blockage (obstruction) of the flow of urine from the kidney which leads to pain. Most patients can manage kidney stones at home (without a hospital stay).  However, sometimes your condition may be worse than it seemed at first, or may get worse with time. Most kidney stones will pass on their own, but occasionally stones may need to be removed by an urologist.    Generally, every Emergency Department visit should have a follow-up clinic visit with either a primary or a specialty clinic/provider. Please follow-up as instructed by your emergency provider today.      Return to the Emergency Department if:  Your pain is not controlled despite the medications provided or recommended.  You are vomiting (throwing up) and cannot keep fluids or medications down.  You develop a fever (>100.4 F).  You feel much more ill or develop new symptoms.  What can I do to help myself?  Be sure to drink plenty of fluids.  If instructed to do so, strain your urine (pee) with the urine strainer you were provided with today. Your stone may look like a grain of sand or a small pebble. Collect any stones in the cup provided and bring to your follow-up appointment.  Staying active is good, and may help the stone to pass. You may do whatever you feel up to doing without restrictions.   Treatment:  Non-steroidal anti-inflammatory drugs (NSAIDs). This includes prescription medicines like Toradol  (ketorolac) and non-prescription medicines like Advil  (ibuprofen) and Nuprin  (ibuprofen) and Naproxen. These  pain relievers are very effective for kidney stones.  Nausea (sick to your stomach) medication.  Nausea and vomiting are common with kidney stones, so your provider may send you home with medicine for this.   Flomax  (tamsulosin). This medicine is sometimes used for men with prostate problems, but also can help kidney stones to pass. Its effectiveness is controversial or questionable so it is prescribed in certain situations. This medicine can lower blood pressure, and you may feel faint/lightheaded, especially when you first stand up. Be sure to get up gradually, sit down if you feel faint, and avoid activity where feeling faint would be dangerous, such as climbing ladders.  If you were given a prescription for medicine here today, be sure to read all of the information (including the package insert) that comes with your prescription.  This will include important information about the medicine, its side effects, and any warnings that you need to know about.  The pharmacist who fills the prescription can provide more information and answer questions you may have about the medicine.  If you have questions or concerns that the pharmacist cannot address, please call or return to the Emergency Department.   Remember that you can always come back to the Emergency Department if you are not able to see your regular provider in the amount of time listed above, if you get any new symptoms, or if there is anything that worries you.

## 2023-08-15 NOTE — TELEPHONE ENCOUNTER
This will require more triage.   The ED provider's note from today is not yet documented.   Will await this information.

## 2023-08-15 NOTE — TELEPHONE ENCOUNTER
Patient calls from the ED and states that the ED Providers thinks she should have a colonoscopy as soon as possible due to CT results today:    BOWEL: Colonic diverticulosis without evidence of acute  diverticulitis. Moderate burden of stool throughout the colon. There  is abnormal asymmetric soft tissue wall thickening of the ascending  colon (series 3, image 110 and series 4, image 39). Small duodenal  diverticulum.    Patient would like orders entered by Dr. Love.  Wherever she could go and get in the fastest.  Please address and address.

## 2023-08-15 NOTE — ED TRIAGE NOTES
Patient presents to the ED with low back pain. States began yesterday and was mild but is worse today. Denies known injury. Is ambulatory.

## 2023-08-15 NOTE — ED PROVIDER NOTES
History     Chief Complaint:  Back Pain       HPI   Nubia Coronel is a 82 year old female, with history of fibromyalgia, type 2 diabetes, osteoarthritis, and ureteral stones, who presents for evaluation of right-sided low back pain.  Patient reports onset of symptoms this morning upon waking.  She thought she may have just slept wrong, and she prepared for water aerobics.  About half hour into her class, she had to leave due to the severity of the pain.  She describes a stabbing pain in her right low back which wraps around to both sides and into her right lower abdomen.  She took her typical dose of tramadol this morning, with no change in her pain.  She currently rates her pain 10/10.  She denies any aggravation or alleviation of her pain with different movements or positions.  She notes that when she had a kidney stone several years ago, she actually had left-sided chest pain rather than flank pain.  She denies any hematuria, urgency, frequency, or dysuria.  She denies saddle anesthesia, pain radiating down her legs, weakness, or loss of bowel or bladder control.  She denies fevers, vomiting, chest pain, or shortness of breath.    Patient has a safe ride home from the ED, should she receive narcotic pain medication.      Independent Historian:   None - Patient Only    Review of External Notes:   Internal medicine note from May of this year, noted treatment for fibromyalgia, type 2 diabetes, and migraines.      Medications:    ondansetron (ZOFRAN ODT) 4 MG ODT tab  tamsulosin (FLOMAX) 0.4 MG capsule  Alpha-Lipoic Acid 300 MG TABS  amoxicillin-clavulanate (AUGMENTIN) 875-125 MG tablet  butalbital-aspirin-caffeine (FIORINAL) -40 MG capsule  ciclopirox (LOPROX) 0.77 % cream  Collagen-Boron-Hyaluronic Acid ([facility] TRIPLE ACTION PO)  cyanocobalamin (VITAMIN B-12) 1000 MCG tablet  esomeprazole (NEXIUM) 20 MG DR capsule  fexofenadine (ALLEGRA) 180 MG tablet  hydrochlorothiazide (HYDRODIURIL)  "50 MG tablet  Levocarnitine 500 MG CAPS  Multiple Vitamins-Minerals ([facility] WOMENS DAILY MULTIVITAMIN PO)  Probiotic Product (PROBIOTIC DAILY) CAPS  topiramate (TOPAMAX) 25 MG tablet  traMADol (ULTRAM) 50 MG tablet  traZODone (DESYREL) 50 MG tablet  TUMS 500 OR  UNABLE TO FIND  UNABLE TO FIND  urea (GORMEL) 20 % external cream  VITAMIN D 1000 UNIT OR TABS        Past Medical History:    Past Medical History:   Diagnosis Date    Fibromyalgia     Myofacial muscle pain     Osteoarthrosis, unspecified whether generalized or localized, other specified sites     Prediabetes 12/17/2019       Past Surgical History:    Past Surgical History:   Procedure Laterality Date    APPENDECTOMY      CHOLECYSTECTOMY      COLONOSCOPY  02/16/2012    Procedure:COLONOSCOPY; COLONOSCOPY; Surgeon:ELIZABETH SINGH; Location: GI    GYN SURGERY      hysterectomy    HAND SURGERY Right 10/10/2022    Right thumb CMC joint arthroplasty using a suspension technique (two #2 FiberWire were used for suspension) along with a trapeziectomy, Dr. Jaxon Saeed, [facility]    HEAD & NECK SURGERY      sinus surgery    ORTHOPEDIC SURGERY      bunions    [facility] NONSPECIFIC PROCEDURE  12/01/1998    Negative MRI of the left shoulder    [facility] NONSPECIFIC PROCEDURE  01/01/1999    Normal bone density scan    [facility] NONSPECIFIC PROCEDURE      Normal pap and mammos    Z NONSPECIFIC PROCEDURE  09/01/2001    Negative brain MRI    [facility] NONSPECIFIC PROCEDURE      Negative liver biopy    [facility] NONSPECIFIC PROCEDURE      S/P cholecystectomy    [facility] NONSPECIFIC PROCEDURE      S/P hysterectomy    Z NONSPECIFIC PROCEDURE      S/P appendectomy    Z NONSPECIFIC PROCEDURE      Negative breast biopsy        Physical Exam   Patient Vitals for the past 24 hrs:   BP Temp Temp src Pulse Resp SpO2 Height Weight   08/15/23 1315 134/65 -- -- 73 18 100 % -- --   08/15/23 1100 (!) 150/70 -- -- 77 -- 99 % -- --   08/15/23 1035 (!) 165/66 97.6  F (36.4  C) Oral 63 -- 97 % 1.626 m (5' 4\") 66.7 kg " (147 lb)   08/15/23 0957 (!) 170/89 -- -- -- -- -- -- --   08/15/23 0956 -- 98.2  F (36.8  C) -- 85 16 97 % -- --        Physical Exam  Vitals: Reviewed, as above.  Notable for hypertension.  General: Alert and oriented, in mild distress. Resting on bed.  Skin: Warm and well-perfused. No rashes, lesions, or erythema, including along the flanks.   HEENT:   Head: Normocephalic, atraumatic. Facial features symmetric.   Eyes: Conjunctiva pink, sclera white. EOMs grossly intact.   Ears: Auricles without lesion, erythema, or edema.   Nose: Symmetric with no discharge.  Mouth and throat: Lips are moist. Buccal mucosa is pink and moist without lesions. Oropharyngeal mucosa is pink and moist with no erythema, edema, or exudate. Uvula is midline.  Neck: Supple with no lymphadenopathy. Full ROM.   Pulmonary: Chest wall expansion symmetric with no increased work of breathing. Lungs clear to auscultation bilaterally.   Cardiovascular: Heart RRR with no murmurs. 2+ radial and tibialis posterior pulses bilaterally. No peripheral edema.  Abdominal: No hernias or distension.  No CVA tenderness.  Bowel sounds present and physiologic. Abdomen is soft.  Tenderness to palpation in the right lower quadrant with involuntary muscle guarding.  No masses or organomegaly.   Musculoskeletal: Moves all extremities spontaneously.  No midline spinal tenderness.  Tenderness palpation in the right lumbar region of the low back, near the right hip.  Negative straight leg raise bilaterally.  Neuro: Patient is alert and oriented to person place time.  Speech fluent with normal cognition.  RLE strength 5/5: Ankle flexion/extension, knee flexion/extension, hip flexion/extension  LLE strength 5/5: Ankle flexion/extension, knee flexion/extension, hip flexion/extension  Psych: Affect appropriate.  Answers questions appropriately. Patient appears calm.      Emergency Department Course     Imaging:  CT Abdomen Pelvis w/o Contrast   Final Result   IMPRESSION:     1.  Abnormal asymmetric wall thickening of the proximal ascending   colon. Recommend outpatient colonoscopy to evaluate for colonic   malignancy.   2.  Obstructing 3 mm calculus at the right UVJ with mild right   hydronephrosis. Asymmetric right greater than left perinephric   stranding. Correlate with urinalysis as clinically indicated.      [Recommend Follow Up: Abnormal wall thickening of the proximal colon]      This report will be copied to the [facility] to ensure a   provider acknowledges the finding. Access Center is available Monday   through Friday 8am-3:30 pm.      NETTE AVILA MD            SYSTEM ID:  [id number]         Report per radiology    Laboratory:  Labs Ordered and Resulted from Time of ED Arrival to Time of ED Departure   COMPREHENSIVE METABOLIC PANEL - Abnormal       Result Value    Sodium 140      Potassium 3.2 (*)     Chloride 99      Carbon Dioxide (CO2) 29      Anion Gap 12      Urea Nitrogen 12.6      Creatinine 0.84      Calcium 9.8      Glucose 176 (*)     Alkaline Phosphatase 89      AST 44      ALT 60 (*)     Protein Total 6.9      Albumin 4.3      Bilirubin Total 0.4      GFR Estimate 69     ROUTINE UA WITH MICROSCOPIC REFLEX TO CULTURE - Abnormal    Color Urine Yellow      Appearance Urine Clear      Glucose Urine Negative      Bilirubin Urine Negative      Ketones Urine Negative      Specific Gravity Urine 1.016      Blood Urine Large (*)     pH Urine 6.5      Protein Albumin Urine 10 (*)     Urobilinogen Urine Normal      Nitrite Urine Negative      Leukocyte Esterase Urine Small (*)     Bacteria Urine Few (*)     Amorphous Crystals Urine Few (*)     RBC Urine >182 (*)     WBC Urine 7 (*)     Squamous Epithelials Urine <1     CBC WITH PLATELETS AND DIFFERENTIAL - Abnormal    WBC Count 9.2      RBC Count 4.56      Hemoglobin 15.3      Hematocrit 45.6            MCH 33.6 (*)     MCHC 33.6      RDW 12.9      Platelet Count 253      % Neutrophils 69      %  Lymphocytes 22      % Monocytes 7      % Eosinophils 1      % Basophils 1      % Immature Granulocytes 0      NRBCs per 100 WBC 0      Absolute Neutrophils 6.4      Absolute Lymphocytes 2.0      Absolute Monocytes 0.6      Absolute Eosinophils 0.1      Absolute Basophils 0.1      Absolute Immature Granulocytes 0.0      Absolute NRBCs 0.0          Emergency Department Course & Assessments:       Interventions:  Medications   0.9% sodium chloride BOLUS (0 mLs Intravenous Stopped 8/15/23 1317)   ketorolac (TORADOL) injection 10 mg (10 mg Intravenous $Given 8/15/23 1129)   potassium chloride ER (KLOR-CON M) CR tablet 20 mEq (20 mEq Oral $Given 8/15/23 1312)        Assessments:  I evaluated the patient, as noted above.    Independent Interpretation (X-rays, CTs, rhythm strip):  None    Consultations/Discussion of Management or Tests:  None        Social Determinants of Health affecting care:   None    Disposition:  The patient was discharged to home.     Impression & Plan      Medical Decision Making:  Nubia Coronel is a 82 year old female, with history of fibromyalgia, type 2 diabetes, osteoarthritis, and ureteral stones, who presents for evaluation of right-sided low back pain.  Please see HPI and exam for details per differential includes ureteral stone, UTI, musculoskeletal strain, cauda equina syndrome, malignancy, other intra-abdominal pathology, among others.  Patient has a reassuring physical exam with stable vitals.  She is afebrile with no signs of infection (pyuria is likely due to large hematuria).  Urinalysis reveals large blood and amorphous crystals.  Urinalysis and pattern of symptoms are not concerning for infection.  CT of the abdomen pelvis confirms a 3 mm right UVJ stone.  This also incidentally reveals right colonic thickening, concerning for malignancy.  Patient's last colonoscopy was 10 years ago.  I recommended that she follow-up closely with her primary care provider to arrange a  colonoscopy, as her primary GI provider has retired.  She understands the importance of following up on this incidental finding.  With regards to her kidney stone, her pain responded well to Toradol, and she declined morphine and Zofran.  I did discharge her with prescriptions for tamsulosin and Zofran.  She has tramadol available to her at home for breakthrough pain.  We discussed proper use of ibuprofen.  Return precautions to the ED were discussed, including intractable pain, vomiting, fevers, or other emergent concerns. findings less consistent with    Critical Care time:  was 0 minutes for this patient excluding procedures.    Diagnosis:    ICD-10-CM    1. Right ureteral stone  N20.1       2. Colonic thickening  K63.9     asymmetric ascending colonic wall thickening, seen on CT 8/15/2023           Discharge Medications:  Discharge Medication List as of 8/15/2023  1:17 PM        START taking these medications    Details   ondansetron (ZOFRAN ODT) 4 MG ODT tab Take 1 tablet (4 mg) by mouth every 8 hours as needed for nausea, Disp-10 tablet, R-0, E-Prescribe      tamsulosin (FLOMAX) 0.4 MG capsule Take 1 capsule (0.4 mg) by mouth daily for 7 days, Disp-7 capsule, R-0, E-Prescribe                  8/15/2023   Jael Reardon PA-C Sells, Jenna, PA-C  08/15/23 1735

## 2023-08-17 NOTE — TELEPHONE ENCOUNTER
Spoke with radiologist who interpreted the CT, and then with patient.   Placed order for colonoscopy.

## 2023-08-18 ENCOUNTER — TELEPHONE (OUTPATIENT)
Dept: GASTROENTEROLOGY | Facility: CLINIC | Age: 83
End: 2023-08-18
Payer: MEDICARE

## 2023-08-18 NOTE — TELEPHONE ENCOUNTER
"Endoscopy Scheduling Screen    Have you had a positive Covid test in the last 14 days?  No    Are you active on MyChart?   Yes    What insurance is in the chart?  Other:  MEDICARE    Ordering/Referring Provider:     Eric Love MD in [facility]      (If ordering provider performs procedure, schedule with ordering provider unless otherwise instructed. )    BMI: Estimated body mass index is 25.23 kg/m  as calculated from the following:    Height as of 8/15/23: 1.626 m (5' 4\").    Weight as of 8/15/23: 66.7 kg (147 lb).     Sedation Ordered  moderate sedation.   If patient BMI > 50 do not schedule in [facility].    Are you taking any prescription medications for pain?   No    Are you taking methadone or Suboxone?  No    Do you have a history of malignant hyperthermia or adverse reaction to anesthesia?  No    (Females) Are you currently pregnant?   No     Have you been diagnosed or told you have pulmonary hypertension?   No    Do you have an LVAD?  No    Have you been told you have moderate to severe sleep apnea?  No    Have you been told you have COPD, asthma, or any other lung disease?  No    Do you have any heart conditions?  No     Have you ever had or are you awaiting a heart or lung transplant?   No    Have you had a stroke or transient ischemic attack (TIA aka \"mini stroke\" in the last 6 months?   No    Have you been diagnosed with or been told you have cirrhosis of the liver?   No    Are you currently on dialysis?   No    Do you need assistance transferring?   No    BMI: Estimated body mass index is 25.23 kg/m  as calculated from the following:    Height as of 8/15/23: 1.626 m (5' 4\").    Weight as of 8/15/23: 66.7 kg (147 lb).     Is patients BMI > 40 and scheduling location [facility]?  No    Do you take the medication Phentermine, Ozempic or Wegovy?  No    Do you take the medication Naltrexone?  No    Do you take blood thinners?  No      Prep   Are you currently on dialysis or do you have chronic kidney disease?  No    Do " you have a diagnosis of diabetes?  No    Do you have a diagnosis of cystic fibrosis (CF)?  No    On a regular basis do you go 3 -5 days between bowel movements?  No    BMI > 40?  No    Preferred Pharmacy:    [facility] [address] [facility] [id number] [facility]  [id number] [facility] [address]  Phone: [phone] Fax: [phone]      Final Scheduling Details   Colonoscopy prep sent?  MiraLAX (No Mag Citrate)    Procedure scheduled  Colonoscopy    Surgeon:  Paulina      Date of procedure:  09/11/2023     Schedule PAC:   No    Location  [facility]    Sedation   Moderate Sedation    Patient Reminders:   You will receive a call from a Nurse to review instructions and health history.  This assessment must be completed prior to your procedure.  Failure to complete the Nurse assessment may result in the procedure being cancelled.      On the day of your procedure, please designate an adult(s) who can drive you home stay with you for the next 24 hours. The medicines used in the exam will make you sleepy. You will not be able to drive.      You cannot take public transportation, ride share services, or non-medical taxi service without a responsible caregiver.  Medical transport services are allowed with the requirement that a responsible caregiver will receive you at your destination.  We require that drivers and caregivers are confirmed prior to your procedure.

## 2023-08-20 ENCOUNTER — HEALTH MAINTENANCE LETTER (OUTPATIENT)
Age: 83
End: 2023-08-20

## 2023-08-24 ENCOUNTER — TELEPHONE (OUTPATIENT)
Dept: GASTROENTEROLOGY | Facility: CLINIC | Age: 83
End: 2023-08-24
Payer: MEDICARE

## 2023-08-24 DIAGNOSIS — K63.9 COLON WALL THICKENING: Primary | ICD-10-CM

## 2023-08-24 NOTE — TELEPHONE ENCOUNTER
Pre visit planning completed.      Procedure details:    Patient scheduled for Colonoscopy  on 9/11/23.     Arrival time: 1130. Procedure time 1230    Pre op exam needed? N/A    Facility location: [facility]; [address] [place] [address], [address] Floor, [place] [address]    Sedation type: Conscious sedation      Indication for procedure: Wall thickening      Chart review:     Electronic implanted devices? No    Diabetic? Yes. Not on diabetic medication - please verify if patient is on medication. Patient denied being diabetic during schedulers questions, per chart this is a newer diagnosis. Patient will need golytley prep.    Diabetic medication HOLDING recommendations: (if applicable)  Oral diabetic medications: No  Diabetic injectables: No  Insulin: No      Medication review:    Anticoagulants? No    NSAIDS? No NSAID medications per patient's medication list.  RN will verify with pre-assessment call.    Other medication HOLDING recommendations:  N/A      Prep for procedure:     Bowel prep recommendation: Extended prep Golytely  may need low volume prep  Due to:  chronic pain medication noted in chart. Tramadol use    Prep instructions sent via [employer] Bowel prep script not sent.         Nay London RN  Endoscopy Procedure Pre Assessment RN  [phone] option 4

## 2023-08-25 NOTE — TELEPHONE ENCOUNTER
Attempted to contact patient in order to complete pre assessment questions.     Patient scheduled for Colonoscopy  on 9/11/23.     No answer. Left message to return call to [phone] option 4      Akilah Glass RN  Endoscopy Procedure Pre Assessment RN

## 2023-09-01 RX ORDER — BISACODYL 5 MG/1
TABLET, DELAYED RELEASE ORAL
Qty: 4 TABLET | Refills: 0 | Status: SHIPPED | OUTPATIENT
Start: 2023-09-01

## 2023-09-01 NOTE — TELEPHONE ENCOUNTER
Pre assessment completed for upcoming procedure.      Procedure details:    Patient scheduled for Colonoscopy  on 9/11/23.     Arrival time: 1130. Procedure time 1230    Pre op exam needed? N/A    [facility]; [address] [place] [address], [address] Floor, [place] [address]    Sedation type: Conscious sedation     Indication for procedure: wall thickening    COVID policy reviewed.    Designated  policy reviewed. Instructed to have someone stay 6 hours post procedure.       Chart review:     Electronic implanted devices? No    Diabetic? Yes. Not on diabetic medication    Diabetic medication HOLDING recommendations: (if applicable)  Oral diabetic medications: No  Diabetic injectables: No  Insulin: No    Medication review:    Anticoagulants? No    NSAIDS? No    Other medication HOLDING recommendations:  N/A      Prep for procedure:     Bowel prep recommendation: Standard Golytely   Due to: diabetes. Patient does take tramadol every day. Patient states she has very regular bowel habits.     Prep instructions sent via [employer] Bowel prep script sent to    [facility] [address] [place] [address] [id number]      Reviewed procedure prep instructions.     Patient verbalized understanding and had no questions or concerns at this time.        Nay London RN  Endoscopy Procedure Pre Assessment RN  [phone] option 4

## 2023-09-08 NOTE — TELEPHONE ENCOUNTER
Incoming call from patient.     Reviewed low fiber diet foods. Patient had no further questions at this time.     Akilah Peterson RN  Endoscopy Procedure Pre Assessment RN  [phone] option 4

## 2023-09-11 ENCOUNTER — ANESTHESIA EVENT (OUTPATIENT)
Dept: SURGERY | Facility: AMBULATORY SURGERY CENTER | Age: 83
End: 2023-09-11
Payer: MEDICARE

## 2023-09-11 ENCOUNTER — ANESTHESIA (OUTPATIENT)
Dept: SURGERY | Facility: AMBULATORY SURGERY CENTER | Age: 83
End: 2023-09-11
Payer: MEDICARE

## 2023-09-11 ENCOUNTER — HOSPITAL ENCOUNTER (OUTPATIENT)
Facility: AMBULATORY SURGERY CENTER | Age: 83
Discharge: HOME | End: 2023-09-11
Attending: INTERNAL MEDICINE
Payer: MEDICARE

## 2023-09-11 VITALS
RESPIRATION RATE: 16 BRPM | WEIGHT: 147 LBS | BODY MASS INDEX: 25.1 KG/M2 | DIASTOLIC BLOOD PRESSURE: 39 MMHG | OXYGEN SATURATION: 97 % | HEIGHT: 64 IN | HEART RATE: 88 BPM | TEMPERATURE: 97.1 F | SYSTOLIC BLOOD PRESSURE: 122 MMHG

## 2023-09-11 VITALS — HEART RATE: 88 BPM

## 2023-09-11 DIAGNOSIS — K63.5 POLYP OF ASCENDING COLON, UNSPECIFIED TYPE: Primary | ICD-10-CM

## 2023-09-11 LAB — COLONOSCOPY: NORMAL

## 2023-09-11 PROCEDURE — 88305 TISSUE EXAM BY PATHOLOGIST: CPT | Mod: TC | Performed by: INTERNAL MEDICINE

## 2023-09-11 PROCEDURE — 45385 COLONOSCOPY W/LESION REMOVAL: CPT | Performed by: INTERNAL MEDICINE

## 2023-09-11 PROCEDURE — 45381 COLONOSCOPY SUBMUCOUS NJX: CPT | Performed by: INTERNAL MEDICINE

## 2023-09-11 RX ORDER — ONDANSETRON 4 MG/1
4 TABLET, ORALLY DISINTEGRATING ORAL EVERY 6 HOURS PRN
Status: CANCELLED | OUTPATIENT
Start: 2023-09-11

## 2023-09-11 RX ORDER — NALOXONE HYDROCHLORIDE 0.4 MG/ML
0.4 INJECTION, SOLUTION INTRAMUSCULAR; INTRAVENOUS; SUBCUTANEOUS
Status: CANCELLED | OUTPATIENT
Start: 2023-09-11

## 2023-09-11 RX ORDER — NALOXONE HYDROCHLORIDE 0.4 MG/ML
0.2 INJECTION, SOLUTION INTRAMUSCULAR; INTRAVENOUS; SUBCUTANEOUS
Status: CANCELLED | OUTPATIENT
Start: 2023-09-11

## 2023-09-11 RX ORDER — LIDOCAINE 40 MG/G
CREAM TOPICAL
Status: DISCONTINUED | OUTPATIENT
Start: 2023-09-11 | End: 2023-09-12 | Stop reason: HOSPADM

## 2023-09-11 RX ORDER — FLUMAZENIL 0.1 MG/ML
0.2 INJECTION, SOLUTION INTRAVENOUS
Status: CANCELLED | OUTPATIENT
Start: 2023-09-11 | End: 2023-09-12

## 2023-09-11 RX ORDER — ONDANSETRON 2 MG/ML
4 INJECTION INTRAMUSCULAR; INTRAVENOUS EVERY 6 HOURS PRN
Status: CANCELLED | OUTPATIENT
Start: 2023-09-11

## 2023-09-11 RX ORDER — PROCHLORPERAZINE MALEATE 5 MG
5 TABLET ORAL EVERY 6 HOURS PRN
Status: CANCELLED | OUTPATIENT
Start: 2023-09-11

## 2023-09-11 RX ORDER — PROPOFOL 10 MG/ML
INJECTION, EMULSION INTRAVENOUS CONTINUOUS PRN
Status: DISCONTINUED | OUTPATIENT
Start: 2023-09-11 | End: 2023-09-11

## 2023-09-11 RX ORDER — ONDANSETRON 2 MG/ML
4 INJECTION INTRAMUSCULAR; INTRAVENOUS
Status: DISCONTINUED | OUTPATIENT
Start: 2023-09-11 | End: 2023-09-12 | Stop reason: HOSPADM

## 2023-09-11 RX ORDER — LIDOCAINE HYDROCHLORIDE 20 MG/ML
INJECTION, SOLUTION INFILTRATION; PERINEURAL PRN
Status: DISCONTINUED | OUTPATIENT
Start: 2023-09-11 | End: 2023-09-11

## 2023-09-11 RX ORDER — SODIUM CHLORIDE, SODIUM LACTATE, POTASSIUM CHLORIDE, CALCIUM CHLORIDE 600; 310; 30; 20 MG/100ML; MG/100ML; MG/100ML; MG/100ML
INJECTION, SOLUTION INTRAVENOUS CONTINUOUS PRN
Status: DISCONTINUED | OUTPATIENT
Start: 2023-09-11 | End: 2023-09-11

## 2023-09-11 RX ADMIN — LIDOCAINE HYDROCHLORIDE 60 MG: 20 INJECTION, SOLUTION INFILTRATION; PERINEURAL at 12:23

## 2023-09-11 RX ADMIN — PROPOFOL 150 MCG/KG/MIN: 10 INJECTION, EMULSION INTRAVENOUS at 12:23

## 2023-09-11 RX ADMIN — SODIUM CHLORIDE, SODIUM LACTATE, POTASSIUM CHLORIDE, CALCIUM CHLORIDE: 600; 310; 30; 20 INJECTION, SOLUTION INTRAVENOUS at 12:18

## 2023-09-11 NOTE — ANESTHESIA CARE TRANSFER NOTE
Patient: Nubia Coronel    Procedure: Procedure(s):  Colonoscopy WITH POLYPECTOMY AND CLIPS       Diagnosis: Colon wall thickening [K63.9]  Diagnosis Additional Information: No value filed.    Anesthesia Type:   MAC     Note:    Oropharynx: oropharynx clear of all foreign objects and spontaneously breathing  Level of Consciousness: awake  Oxygen Supplementation: room air    Independent Airway: airway patency satisfactory and stable  Dentition: dentition unchanged  Vital Signs Stable: post-procedure vital signs reviewed and stable  Report to RN Given: handoff report given  Patient transferred to: Phase II    Handoff Report: Identifed the Patient, Identified the Reponsible Provider, Reviewed the pertinent medical history, Discussed the surgical course, Reviewed Intra-OP anesthesia mangement and issues during anesthesia, Set expectations for post-procedure period and Allowed opportunity for questions and acknowledgement of understanding      Vitals:  Vitals Value Taken Time   /39 09/11/23 1300   Temp 36.2  C (97.1  F) 09/11/23 1300   Pulse 88 09/11/23 1300   Resp 16 09/11/23 1300   SpO2 97 % 09/11/23 1300       Electronically Signed By: MICHI Lockwood CRNA  September 11, 2023  1:02 PM

## 2023-09-11 NOTE — H&P
Nubia Coronel  [id number]  female  82 year old      Reason for procedure/surgery: abnormal imaging    Patient Active Problem List   Diagnosis    Headache    Chronic rhinitis    Myalgia and myositis    Renal calculus    CARDIOVASCULAR SCREENING; LDL GOAL LESS THAN 160    GERD (gastroesophageal reflux disease)    Bilateral lower extremity edema    Kidney stone    Fibromyalgia    Advance Care Planning    Primary insomnia    Prediabetes    Intractable migraine without aura and without status migrainosus    Degeneration of cervical intervertebral disc    Encounter for medication refill    Sleep disturbance    Diabetes mellitus, type 2 (H)    Colonic thickening       Past Surgical History:    Past Surgical History:   Procedure Laterality Date    APPENDECTOMY      CHOLECYSTECTOMY      COLONOSCOPY  02/16/2012    Procedure:COLONOSCOPY; COLONOSCOPY; Surgeon:ELIZABETH SINGH Location: GI    GYN SURGERY      hysterectomy    HAND SURGERY Right 10/10/2022    Right thumb CMC joint arthroplasty using a suspension technique (two #2 FiberWire were used for suspension) along with a trapeziectomy, Dr. Jaxon Saeed, [facility]    HEAD & NECK SURGERY      sinus surgery    ORTHOPEDIC SURGERY      bunions    [facility] NONSPECIFIC PROCEDURE  12/01/1998    Negative MRI of the left shoulder    Z NONSPECIFIC PROCEDURE  01/01/1999    Normal bone density scan    Z NONSPECIFIC PROCEDURE      Normal pap and mammos    ZZ NONSPECIFIC PROCEDURE  09/01/2001    Negative brain MRI    ZZ NONSPECIFIC PROCEDURE      Negative liver biopy    Z NONSPECIFIC PROCEDURE      S/P cholecystectomy    Z NONSPECIFIC PROCEDURE      S/P hysterectomy    ZZ NONSPECIFIC PROCEDURE      S/P appendectomy    ZZ NONSPECIFIC PROCEDURE      Negative breast biopsy       Past Medical History:   Past Medical History:   Diagnosis Date    Fibromyalgia     Myofacial muscle pain     having myofacial tx     Osteoarthrosis, unspecified whether generalized or  localized, other specified sites     Cervical DJD - Dr. Dalia Huynh    Prediabetes 2019       Social History:   Social History     Tobacco Use    Smoking status: Former     Types: Cigarettes     Quit date: 10/1/1981     Years since quittin.9     Passive exposure: Never    Smokeless tobacco: Never   Substance Use Topics    Alcohol use: Not Currently     Comment: social       Family History:   Family History   Problem Relation Age of Onset    Family History Negative Mother          age 94    C.A.D. Father           75 yo MI    Blood Disease Sister         Factor 2 - blood clots,allergies    Respiratory Brother          68 yo Emphysema (smoker)    Diabetes Brother     C.A.D. Brother         pacemaker    Family History Negative Son     Family History Negative Son     Family History Negative Son        Allergies:   Allergies   Allergen Reactions    Cortisone Hives     Prick and intradermal tests with various steroids negatives, including Hydrocortisone succinate, methylprednisolone succinate, Triamcinolone, Dexamethasone. If these steroids are necessary, they can be given. Premedicate patient with antihistamines and keep an eye on patient for 1h       Active Medications:   Current Outpatient Medications   Medication Sig Dispense Refill    Alpha-Lipoic Acid 300 MG TABS Take by mouth 2 times daily      amoxicillin-clavulanate (AUGMENTIN) 875-125 MG tablet Take 1 tablet by mouth 2 times daily 28 tablet 0    bisacodyl (DULCOLAX) 5 MG EC tablet Take 2 tablets at 3 pm the day before your procedure. If your procedure is before 11 am, take 2 additional tablets at 11 pm. If your procedure is after 11 am, take 2 additional tablets at 6 am. For additional instructions refer to your colonoscopy prep instructions. 4 tablet 0    butalbital-aspirin-caffeine (FIORINAL) -40 MG capsule Take 1 capsule by mouth every 4 hours as needed for headaches 20 capsule 0    ciclopirox (LOPROX) 0.77 % cream  Apply topically daily 30 g 6    Collagen-Boron-Hyaluronic Acid ([employer] TRIPLE ACTION PO) Take 1 capsule by mouth daily      cyanocobalamin (VITAMIN B-12) 1000 MCG tablet Take 1 tablet (1,000 mcg) by mouth daily 90 tablet 0    esomeprazole (NEXIUM) 20 MG DR capsule Take 20 mg by mouth every morning (before breakfast) Take 30-60 minutes before eating.      fexofenadine (ALLEGRA) 180 MG tablet Take 1 tablet (180 mg) by mouth every evening 50 tablet 2    hydrochlorothiazide (HYDRODIURIL) 50 MG tablet TAKE 1 TABLET EVERY DAY 90 tablet 3    Levocarnitine 500 MG CAPS TID      Multiple Vitamins-Minerals (QC WOMENS DAILY MULTIVITAMIN PO) Take by mouth 2 times daily      polyethylene glycol (GOLYTELY) 236 g suspension The night before the exam at 6 pm drink an 8-ounce glass every 15 minutes until the jug is half empty. If you arrive before 11 AM: Drink the other half of the [employer] jug at 11 PM night before procedure. If you arrive after 11 AM: Drink the other half of the [employer] jug at 6 AM day of procedure. For additional instructions refer to your colonoscopy prep instructions. 4000 mL 0    Probiotic Product (PROBIOTIC DAILY) CAPS       topiramate (TOPAMAX) 25 MG tablet TAKE 1 TABLET IN THE MORNING  AND 3 TABLETS IN THE EVENING 360 tablet 3    traMADol (ULTRAM) 50 MG tablet TAKE 1 TABLET BY MOUTH IN THE MORNING AND 1 IN THE EVENING ON TUESDAY, THURSDAY AND Saturday. For Non-Acute Pain. 43 tablet 5    traZODone (DESYREL) 50 MG tablet Take 1 tablet (50 mg) by mouth At Bedtime 90 tablet 3    TUMS 500 OR 2 tabs q pm      UNABLE TO FIND Take 500 mg by mouth daily MEDICATION NAME: L Carnitine      UNABLE TO FIND Take 1 capsule by mouth At Bedtime MEDICATION NAME: Tumeric Curcuman      urea (GORMEL) 20 % external cream Apply topically daily 85 g 5    VITAMIN D 1000 UNIT OR TABS 1 daily         Systemic Review:   CONSTITUTIONAL: NEGATIVE for fever, chills, change in weight  ENT/MOUTH: NEGATIVE for ear, mouth and throat  "problems  RESP: NEGATIVE for significant cough or SOB  CV: NEGATIVE for chest pain, palpitations or peripheral edema    Physical Examination:   Vital Signs: /59 (BP Location: Right arm)   Pulse 76   Temp 97.6  F (36.4  C) (Temporal)   Resp 18   Ht 1.626 m (5' 4\")   Wt 66.7 kg (147 lb)   LMP  (LMP Unknown)   SpO2 94%   BMI 25.23 kg/m    GENERAL: healthy, alert and no distress  NECK: no adenopathy, no asymmetry, masses, or scars  RESP: lungs clear to auscultation - no rales, rhonchi or wheezes  CV: regular rate and rhythm, normal S1 S2, no S3 or S4, no murmur, click or rub, no peripheral edema and peripheral pulses strong  ABDOMEN: soft, nontender, no hepatosplenomegaly, no masses and bowel sounds normal  MS: no gross musculoskeletal defects noted, no edema    Plan: Appropriate to proceed as scheduled.      Lisa Gallardo MD  9/11/2023    PCP:  Eric Love    " [Follow-Up - Clinic] : a clinic follow-up of [FreeTextEntry1] : mitral regurgitation.

## 2023-09-11 NOTE — ANESTHESIA PREPROCEDURE EVALUATION
Anesthesia Pre-Procedure Evaluation    Patient: Nubia Coronel   MRN: [id number] : 1940        Procedure : Procedure(s):  Colonoscopy          Past Medical History:   Diagnosis Date    Fibromyalgia     Myofacial muscle pain     having myofacial tx     Osteoarthrosis, unspecified whether generalized or localized, other specified sites     Cervical DJD - Dr. Dalia Huynh    Prediabetes 2019      Past Surgical History:   Procedure Laterality Date    APPENDECTOMY      CHOLECYSTECTOMY      COLONOSCOPY  2012    Procedure:COLONOSCOPY; COLONOSCOPY; Surgeon:ELIZABETH SINGH Location: GI    GYN SURGERY      hysterectomy    HAND SURGERY Right 10/10/2022    Right thumb CMC joint arthroplasty using a suspension technique (two #2 FiberWire were used for suspension) along with a trapeziectomy, Dr. Jaxon Saeed, [facility]    HEAD & NECK SURGERY      sinus surgery    ORTHOPEDIC SURGERY      bunions    [facility] NONSPECIFIC PROCEDURE  1998    Negative MRI of the left shoulder    [facility] NONSPECIFIC PROCEDURE  1999    Normal bone density scan    [facility] NONSPECIFIC PROCEDURE      Normal pap and mammos    [facility] NONSPECIFIC PROCEDURE  2001    Negative brain MRI    Z NONSPECIFIC PROCEDURE      Negative liver biopy    Z NONSPECIFIC PROCEDURE      S/P cholecystectomy    Z NONSPECIFIC PROCEDURE      S/P hysterectomy    Z NONSPECIFIC PROCEDURE      S/P appendectomy    Z NONSPECIFIC PROCEDURE      Negative breast biopsy      Allergies   Allergen Reactions    Cortisone Hives     Prick and intradermal tests with various steroids negatives, including Hydrocortisone succinate, methylprednisolone succinate, Triamcinolone, Dexamethasone. If these steroids are necessary, they can be given. Premedicate patient with antihistamines and keep an eye on patient for 1h      Social History     Tobacco Use    Smoking status: Former     Types: Cigarettes     Quit date: 10/1/1981     Years since quitting:  41.9     Passive exposure: Never    Smokeless tobacco: Never   Substance Use Topics    Alcohol use: Not Currently     Comment: social      Wt Readings from Last 1 Encounters:   08/15/23 66.7 kg (147 lb)        Anesthesia Evaluation            ROS/MED HX  ENT/Pulmonary:       Neurologic:       Cardiovascular:       METS/Exercise Tolerance:     Hematologic:       Musculoskeletal:       GI/Hepatic:     (+) GERD,                   Renal/Genitourinary:       Endo:     (+)  type II DM,                    Psychiatric/Substance Use:       Infectious Disease:       Malignancy:       Other:      (+)  , H/O Chronic Pain,         Physical Exam    Airway        Mallampati: II       Respiratory Devices and Support         Dental       (+) Modest Abnormalities - crowns, retainers, 1 or 2 missing teeth      Cardiovascular          Rhythm and rate: regular     Pulmonary           breath sounds clear to auscultation           OUTSIDE LABS:  CBC:   Lab Results   Component Value Date    WBC 9.2 08/15/2023    WBC 7.6 05/09/2023    HGB 15.3 08/15/2023    HGB 15.7 05/09/2023    HCT 45.6 08/15/2023    HCT 46.9 05/09/2023     08/15/2023     05/09/2023     BMP:   Lab Results   Component Value Date     08/15/2023     05/09/2023    POTASSIUM 3.2 (L) 08/15/2023    POTASSIUM 3.7 05/09/2023    CHLORIDE 99 08/15/2023    CHLORIDE 101 05/09/2023    CO2 29 08/15/2023    CO2 30 (H) 05/09/2023    BUN 12.6 08/15/2023    BUN 16.0 05/09/2023    CR 0.84 08/15/2023    CR 0.80 05/09/2023     (H) 08/15/2023     (H) 05/09/2023     COAGS: No results found for: PTT, INR, FIBR  POC: No results found for: BGM, HCG, HCGS  HEPATIC:   Lab Results   Component Value Date    ALBUMIN 4.3 08/15/2023    PROTTOTAL 6.9 08/15/2023    ALT 60 (H) 08/15/2023    AST 44 08/15/2023    ALKPHOS 89 08/15/2023    BILITOTAL 0.4 08/15/2023     OTHER:   Lab Results   Component Value Date    A1C 6.4 (H) 05/09/2023    DARRIAN 9.8 08/15/2023    LIPASE 76  12/10/2009    TSH 1.14 05/09/2023    T4 1.11 10/01/2003    CRP 7.8 05/10/2022    SED 17 05/10/2022       Anesthesia Plan    ASA Status:  2    NPO Status:  NPO Appropriate    Anesthesia Type: MAC.     - Reason for MAC: immobility needed              Consents    Anesthesia Plan(s) and associated risks, benefits, and realistic alternatives discussed. Questions answered and patient/representative(s) expressed understanding.     - Discussed:     - Discussed with:  Patient            Postoperative Care            Comments:                Babak Orr MD

## 2023-09-11 NOTE — ANESTHESIA POSTPROCEDURE EVALUATION
Patient: Nubia Coronel    Procedure: Procedure(s):  Colonoscopy WITH POLYPECTOMY AND CLIPS       Anesthesia Type:  MAC    Note:  Disposition: Outpatient   Postop Pain Control: Uneventful            Sign Out: Well controlled pain   PONV: No   Neuro/Psych: Uneventful            Sign Out: Acceptable/Baseline neuro status   Airway/Respiratory: Uneventful            Sign Out: Acceptable/Baseline resp. status   CV/Hemodynamics: Uneventful            Sign Out: Acceptable CV status; No obvious hypovolemia; No obvious fluid overload   Other NRE: NONE   DID A NON-ROUTINE EVENT OCCUR?            Last vitals:  Vitals Value Taken Time   /39 09/11/23 1300   Temp 36.2  C (97.1  F) 09/11/23 1300   Pulse 88 09/11/23 1300   Resp 16 09/11/23 1300   SpO2 97 % 09/11/23 1300       Electronically Signed By: Babak Orr MD  September 11, 2023  2:19 PM

## 2023-09-12 LAB
PATH REPORT.COMMENTS IMP SPEC: NORMAL
PATH REPORT.COMMENTS IMP SPEC: NORMAL
PATH REPORT.FINAL DX SPEC: NORMAL
PATH REPORT.GROSS SPEC: NORMAL
PATH REPORT.RELEVANT HX SPEC: NORMAL
PHOTO IMAGE: NORMAL

## 2023-09-12 PROCEDURE — 88305 TISSUE EXAM BY PATHOLOGIST: CPT | Mod: 26 | Performed by: PATHOLOGY

## 2023-11-07 ENCOUNTER — TELEPHONE (OUTPATIENT)
Dept: INTERNAL MEDICINE | Facility: CLINIC | Age: 83
End: 2023-11-07
Payer: MEDICARE

## 2023-11-07 ENCOUNTER — MYC MEDICAL ADVICE (OUTPATIENT)
Dept: INTERNAL MEDICINE | Facility: CLINIC | Age: 83
End: 2023-11-07
Payer: MEDICARE

## 2023-11-07 DIAGNOSIS — E11.9 TYPE 2 DIABETES MELLITUS WITHOUT COMPLICATION, WITHOUT LONG-TERM CURRENT USE OF INSULIN (H): Primary | ICD-10-CM

## 2023-11-07 DIAGNOSIS — Z13.6 CARDIOVASCULAR SCREENING; LDL GOAL LESS THAN 130: ICD-10-CM

## 2023-11-07 NOTE — TELEPHONE ENCOUNTER
Call received from patient requesting an appointment to see primary care provider before to leaving for [place]. They leave 12/27/23. Advised patient she does already have an appointment scheduled 11/21/23 that was scheduled through [facility].    Patient asking for labs prior to appointment.

## 2023-11-09 DIAGNOSIS — M79.7 FIBROMYALGIA: ICD-10-CM

## 2023-11-09 NOTE — TELEPHONE ENCOUNTER
Sent [employer] message to patient.    Rui Pak, Triage RN [place]  10:54 AM 11/9/2023

## 2023-11-14 ENCOUNTER — LAB (OUTPATIENT)
Dept: LAB | Facility: CLINIC | Age: 83
End: 2023-11-14
Payer: MEDICARE

## 2023-11-14 DIAGNOSIS — E11.9 TYPE 2 DIABETES MELLITUS WITHOUT COMPLICATION, WITHOUT LONG-TERM CURRENT USE OF INSULIN (H): ICD-10-CM

## 2023-11-14 DIAGNOSIS — Z13.6 CARDIOVASCULAR SCREENING; LDL GOAL LESS THAN 130: ICD-10-CM

## 2023-11-14 LAB
ALBUMIN SERPL BCG-MCNC: 4.3 G/DL (ref 3.5–5.2)
ALP SERPL-CCNC: 97 U/L (ref 40–150)
ALT SERPL W P-5'-P-CCNC: 65 U/L (ref 0–50)
ANION GAP SERPL CALCULATED.3IONS-SCNC: 14 MMOL/L (ref 7–15)
AST SERPL W P-5'-P-CCNC: 46 U/L (ref 0–45)
BILIRUB SERPL-MCNC: 0.4 MG/DL
BUN SERPL-MCNC: 15.4 MG/DL (ref 8–23)
CALCIUM SERPL-MCNC: 10.1 MG/DL (ref 8.8–10.2)
CHLORIDE SERPL-SCNC: 98 MMOL/L (ref 98–107)
CHOLEST SERPL-MCNC: 189 MG/DL
CREAT SERPL-MCNC: 0.8 MG/DL (ref 0.51–0.95)
DEPRECATED HCO3 PLAS-SCNC: 28 MMOL/L (ref 22–29)
EGFRCR SERPLBLD CKD-EPI 2021: 73 ML/MIN/1.73M2
GLUCOSE SERPL-MCNC: 137 MG/DL (ref 70–99)
HBA1C MFR BLD: 6.7 % (ref 0–5.6)
HDLC SERPL-MCNC: 77 MG/DL
LDLC SERPL CALC-MCNC: 85 MG/DL
NONHDLC SERPL-MCNC: 112 MG/DL
POTASSIUM SERPL-SCNC: 3.2 MMOL/L (ref 3.4–5.3)
PROT SERPL-MCNC: 7.4 G/DL (ref 6.4–8.3)
SODIUM SERPL-SCNC: 140 MMOL/L (ref 135–145)
TRIGL SERPL-MCNC: 133 MG/DL

## 2023-11-14 PROCEDURE — 36415 COLL VENOUS BLD VENIPUNCTURE: CPT

## 2023-11-14 PROCEDURE — 80061 LIPID PANEL: CPT

## 2023-11-14 PROCEDURE — 83036 HEMOGLOBIN GLYCOSYLATED A1C: CPT

## 2023-11-14 PROCEDURE — 80053 COMPREHEN METABOLIC PANEL: CPT

## 2023-11-14 RX ORDER — TRAMADOL HYDROCHLORIDE 50 MG/1
TABLET ORAL
Qty: 43 TABLET | Refills: 0 | Status: SHIPPED | OUTPATIENT
Start: 2023-11-14 | End: 2023-11-21

## 2023-11-14 NOTE — TELEPHONE ENCOUNTER
Patient calling, she has an appt with primary care provider next week but does not have enough Tramadol to last until then. She will be short about 6 pills.  Asking for refill of Tramadol. DEANNE Mejias R.N.

## 2023-11-21 ENCOUNTER — OFFICE VISIT (OUTPATIENT)
Dept: INTERNAL MEDICINE | Facility: CLINIC | Age: 83
End: 2023-11-21
Attending: INTERNAL MEDICINE
Payer: MEDICARE

## 2023-11-21 VITALS
RESPIRATION RATE: 16 BRPM | BODY MASS INDEX: 26.93 KG/M2 | WEIGHT: 152 LBS | OXYGEN SATURATION: 94 % | TEMPERATURE: 98 F | SYSTOLIC BLOOD PRESSURE: 126 MMHG | HEIGHT: 63 IN | DIASTOLIC BLOOD PRESSURE: 64 MMHG | HEART RATE: 83 BPM

## 2023-11-21 DIAGNOSIS — Z98.890 HISTORY OF SINUS SURGERY: ICD-10-CM

## 2023-11-21 DIAGNOSIS — M79.7 FIBROMYALGIA: ICD-10-CM

## 2023-11-21 DIAGNOSIS — G43.019 INTRACTABLE MIGRAINE WITHOUT AURA AND WITHOUT STATUS MIGRAINOSUS: ICD-10-CM

## 2023-11-21 DIAGNOSIS — R60.0 BILATERAL EDEMA OF LOWER EXTREMITY: ICD-10-CM

## 2023-11-21 DIAGNOSIS — E11.9 TYPE 2 DIABETES MELLITUS WITHOUT COMPLICATION, WITHOUT LONG-TERM CURRENT USE OF INSULIN (H): Primary | ICD-10-CM

## 2023-11-21 DIAGNOSIS — F51.01 PRIMARY INSOMNIA: ICD-10-CM

## 2023-11-21 DIAGNOSIS — J32.9 CHRONIC SINUSITIS, UNSPECIFIED LOCATION: ICD-10-CM

## 2023-11-21 PROCEDURE — 99214 OFFICE O/P EST MOD 30 MIN: CPT | Performed by: INTERNAL MEDICINE

## 2023-11-21 RX ORDER — TOPIRAMATE 25 MG/1
TABLET, FILM COATED ORAL
Qty: 360 TABLET | Refills: 3 | Status: SHIPPED | OUTPATIENT
Start: 2023-11-21 | End: 2024-01-04

## 2023-11-21 RX ORDER — RESPIRATORY SYNCYTIAL VIRUS VACCINE 120MCG/0.5
0.5 KIT INTRAMUSCULAR ONCE
Qty: 1 EACH | Refills: 0 | Status: CANCELLED | OUTPATIENT
Start: 2023-11-21 | End: 2023-11-21

## 2023-11-21 RX ORDER — BUTALBITAL, ASPIRIN, AND CAFFEINE 325; 50; 40 MG/1; MG/1; MG/1
1 CAPSULE ORAL EVERY 4 HOURS PRN
Qty: 10 CAPSULE | Refills: 0 | Status: SHIPPED | OUTPATIENT
Start: 2023-11-21 | End: 2024-05-10

## 2023-11-21 RX ORDER — HYDROCHLOROTHIAZIDE 50 MG/1
TABLET ORAL
Qty: 90 TABLET | Refills: 3 | Status: SHIPPED | OUTPATIENT
Start: 2023-11-21 | End: 2024-01-04

## 2023-11-21 RX ORDER — TRAZODONE HYDROCHLORIDE 50 MG/1
50 TABLET, FILM COATED ORAL AT BEDTIME
Qty: 90 TABLET | Refills: 3 | Status: SHIPPED | OUTPATIENT
Start: 2023-11-21 | End: 2024-05-10

## 2023-11-21 RX ORDER — TRAMADOL HYDROCHLORIDE 50 MG/1
TABLET ORAL
Qty: 43 TABLET | Refills: 5 | Status: SHIPPED | OUTPATIENT
Start: 2023-11-21 | End: 2024-01-10

## 2023-11-21 NOTE — PROGRESS NOTES
"  Assessment & Plan   (E11.9) Type 2 diabetes mellitus without complication, without long-term current use of insulin (H)  (primary encounter diagnosis)  Comment: A1c at target. Continue current measures.     (G43.019) Intractable migraine without aura and without status migrainosus  Comment: Refilled topiramate, prn Fiorinal for rare use.   Plan: butalbital-aspirin-caffeine (FIORINAL)         -40 MG capsule, topiramate (TOPAMAX) 25         MG tablet          (R60.0) Bilateral edema of lower extremity  Comment: Refill hydrochlorothiazide.   Plan: hydrochlorothiazide (HYDRODIURIL) 50 MG tablet          (F51.01) Primary insomnia  Comment: Symptoms controlled with trazodone.   Plan: traZODone (DESYREL) 50 MG tablet          (M79.7) Fibromyalgia  Comment: Continue tramadol.   Plan: traMADol (ULTRAM) 50 MG tablet          (J32.9) Chronic sinusitis, unspecified location  (Z98.890) History of sinus surgery  Comment: Refilled Augmentin.   Plan: amoxicillin-clavulanate (AUGMENTIN) 875-125 MG         tablet           BMI:   Estimated body mass index is 26.6 kg/m  as calculated from the following:    Height as of this encounter: 1.61 m (5' 3.39\").    Weight as of this encounter: 68.9 kg (152 lb).       Patient Instructions   Recent labs look fine.     Refilled all medications, including Augmentin for sinus infection.     See me after 5/9/2024 for next Annual Wellness Exam.     Eric Love MD,   [facility]    Naveen Sanchez is a 82 year old, presenting for the following health issues:  Follow Up (medication)      11/21/2023    10:29 AM   Additional Questions   Roomed by Becky BIRD CMA       History of Present Illness       Reason for visit:  Med check    She eats 2-3 servings of fruits and vegetables daily.She consumes 0 sweetened beverage(s) daily.She exercises with enough effort to increase her heart rate 30 to 60 minutes per day.  She exercises with enough effort to increase her heart rate " "5 days per week.   She is taking medications regularly.     The patient follows up for a number of chronic concerns.   The patient is tolerating her current medications without any adverse effects.     Most notably, she continues taking tramadol 50 mg tabs at a rate of 43 tabs monthly, which she has done for some time for \"fibromyalgia\" pain located predominantly over the posterior neck and shoulders. She has noted satisfactory pain relief on this same regimen for years. A CSA was completed in May, and may be repeated again at next follow up in May 2024.     We reviewed recent labs, showing favorable A1c and LDL-Cholesterol results.   Her BP also appears satisfactorily controlled.     She uses Fiorinal rarely for migraine headaches. She still has a few tabs remaining from the #20 tabs prescribed in May 2022.   She takes topiramate 25 mg (one tab each AM and three tabs each PM) for migraine prophylaxis.     She takes hydrochlorothiazide for leg edema.   Trazodone taken at hs for chronic insomnia.     About once or twice a year she develops sinus pain. For about 10 days she has had pain beneath her eyes and at the bridge of her nose, with nasal congestion and yellow secretions. She is offered an Rx for Augmentin.     Past medical, family and social histories as well as medications reviewed and updated as needed.     Review of Systems   No dyspnea or cough. No chest discomfort, dizziness or palpitations. No diarrhea, abdominal pain or rectal bleeding.   No acute problems with vision or speech, lateralizing weakness or paresthesias.    ROS: as above or negative for Constitutional, ENT, Respiratory, CV, GI, endocrine, musculoskeletal, neuro systems.       Objective    /64 (BP Location: Right arm, Patient Position: Sitting, Cuff Size: Adult Large)   Pulse 83   Temp 98  F (36.7  C) (Oral)   Resp 16   Ht 1.61 m (5' 3.39\")   Wt 68.9 kg (152 lb)   LMP  (LMP Unknown)   SpO2 94%   Breastfeeding No   BMI 26.60 " kg/m    Body mass index is 26.6 kg/m .    Physical Exam   GENERAL: healthy, alert and no distress  EYES: Eyes grossly normal to inspection, PERRL and conjunctivae and sclerae normal  NECK: no adenopathy, no asymmetry, masses, or scars and thyroid normal to palpation  RESP: lungs clear to auscultation - no rales, rhonchi or wheezes  CV: regular rate and rhythm, normal S1 S2, no S3 or S4, no murmur, click or rub, no peripheral edema and peripheral pulses strong  MS: no gross musculoskeletal defects noted, no edema  NEURO: Normal strength and tone, mentation intact and speech normal  BACK: Stiffness of muscles with mild tenderness in the nape of neck into upper back/shoulders bilaterally.   PSYCH: mentation appears normal, affect normal/bright

## 2023-11-21 NOTE — PATIENT INSTRUCTIONS
Recent labs look fine.     Refilled all medications, including Augmentin for sinus infection.     See me after 5/9/2024 for next Annual Wellness Exam.

## 2023-12-12 ENCOUNTER — TELEPHONE (OUTPATIENT)
Dept: INTERNAL MEDICINE | Facility: CLINIC | Age: 83
End: 2023-12-12
Payer: MEDICARE

## 2023-12-12 DIAGNOSIS — J01.01 ACUTE RECURRENT MAXILLARY SINUSITIS: ICD-10-CM

## 2023-12-12 NOTE — TELEPHONE ENCOUNTER
Patient calls to report that Augmentin prescribed during 11/21/2023 visit didn't help with patient's chronic sinusitis. Patient reports she is having constant yellow drainage and headaches/pain in sinuses. Patient reports no fevers, breathing issues or chest pain.     Patient would like to try something else, whatever provider recommends.     Please advise.     Thank you,  Rui Pak, Triage RN [facility]  9:26 AM 12/12/2023

## 2023-12-13 RX ORDER — DOXYCYCLINE HYCLATE 100 MG
100 TABLET ORAL 2 TIMES DAILY
Qty: 20 TABLET | Refills: 0 | Status: SHIPPED | OUTPATIENT
Start: 2023-12-13

## 2023-12-14 ENCOUNTER — TELEPHONE (OUTPATIENT)
Dept: INTERNAL MEDICINE | Facility: CLINIC | Age: 83
End: 2023-12-14
Payer: MEDICARE

## 2023-12-14 NOTE — TELEPHONE ENCOUNTER
Called and relayed provider message to patient. Patient verbalizes understanding of instructions and indicates no further questions at this time.    Thank you,  Rui Pak, Triage RN [facility]  12:57 PM 12/14/2023

## 2023-12-14 NOTE — TELEPHONE ENCOUNTER
Her medical records suggest that she has tolerated this medication in the past, prescribed for sinusitis.   If she is willing to try to continue, taking it with food, suggest this would be first choice.     If not willing to continue taking it, we will need to consider alternatives. The only antibiotics prescribed by our clinic for her in the past 20+ years have been Augmentin, doxycyline, and azithromycin (Zithromax). Zithromax is not usually considered to be one of the more effective antibiotics for sinus infections.

## 2023-12-14 NOTE — TELEPHONE ENCOUNTER
Will try doxycycline BID x 10 days. Has been used in the past by Dr Santiago. Please advise pt.   If no improvement after this will need appt.

## 2023-12-14 NOTE — TELEPHONE ENCOUNTER
Call received from patient dai she picked up the prescription for Doxycycline. She took the medication once and vomited about 20 minutes later. States after taking it she had terrible stomach pains and nausea and then vomited. After she threw up she felt better. Patient was feeling fine before she took the medication other than her sinus pain. Patient had not eaten since breakfast and she took the medication at 2:00. States she does not normally have reactions to medications. Per EPIC is appears that patient has used this medication in the past for sinus infections.    Please advise.

## 2024-01-03 ENCOUNTER — TELEPHONE (OUTPATIENT)
Dept: INTERNAL MEDICINE | Facility: CLINIC | Age: 84
End: 2024-01-03
Payer: COMMERCIAL

## 2024-01-03 DIAGNOSIS — M79.7 FIBROMYALGIA: ICD-10-CM

## 2024-01-03 NOTE — TELEPHONE ENCOUNTER
Fatou from [facility] in [place], [place] wants clarification on patient's trazodone medication as they are having a hard time calculating it appropriately. Pharmacy wants to know if patient supposed to be taking two pills per day on Tuesday, Thursday and Saturday as written on the prescription and patient takes no Trazodone pills on Monday, Wednesday, Friday and Sunday?    Please advise.    Rx was transferred from [facility] pharmacy to [facility] in [place], [place]. Can call back pharmacy at [phone] to follow up once provider advises.    Thank you,  Rui Pak, Triage RN [facility] [employer] [facility]  12:03 PM 1/3/2024

## 2024-01-04 DIAGNOSIS — R60.0 BILATERAL EDEMA OF LOWER EXTREMITY: ICD-10-CM

## 2024-01-04 DIAGNOSIS — G43.019 INTRACTABLE MIGRAINE WITHOUT AURA AND WITHOUT STATUS MIGRAINOSUS: ICD-10-CM

## 2024-01-04 RX ORDER — HYDROCHLOROTHIAZIDE 50 MG/1
TABLET ORAL
Qty: 90 TABLET | Refills: 3 | Status: CANCELLED | OUTPATIENT
Start: 2024-01-04

## 2024-01-04 RX ORDER — TOPIRAMATE 25 MG/1
TABLET, FILM COATED ORAL
Qty: 360 TABLET | Refills: 3 | Status: CANCELLED | OUTPATIENT
Start: 2024-01-04

## 2024-01-04 RX ORDER — HYDROCHLOROTHIAZIDE 50 MG/1
TABLET ORAL
Qty: 90 TABLET | Refills: 1 | Status: SHIPPED | OUTPATIENT
Start: 2024-01-04 | End: 2024-05-10

## 2024-01-04 RX ORDER — TOPIRAMATE 25 MG/1
TABLET, FILM COATED ORAL
Qty: 360 TABLET | Refills: 1 | Status: SHIPPED | OUTPATIENT
Start: 2024-01-04 | End: 2024-03-15

## 2024-01-10 RX ORDER — TRAMADOL HYDROCHLORIDE 50 MG/1
TABLET ORAL
Qty: 43 TABLET | Refills: 5 | Status: SHIPPED | OUTPATIENT
Start: 2024-01-10 | End: 2024-05-10

## 2024-01-10 NOTE — TELEPHONE ENCOUNTER
I think they mean tramadol. (She takes trazodone one tablet or 50 mg each night without variation).    Her tramadol is taken as noted in the Rx:  One tablet every AM, and  One tablet on each Tuesday, Thursday and Saturday pm.     Total #43 tabs monthly.     I've sent another Rx to the pharmacy with the above clarification.

## 2024-03-17 ENCOUNTER — HEALTH MAINTENANCE LETTER (OUTPATIENT)
Age: 84
End: 2024-03-17

## 2024-05-10 ENCOUNTER — OFFICE VISIT (OUTPATIENT)
Dept: INTERNAL MEDICINE | Facility: CLINIC | Age: 84
End: 2024-05-10
Attending: INTERNAL MEDICINE
Payer: COMMERCIAL

## 2024-05-10 VITALS
WEIGHT: 148 LBS | DIASTOLIC BLOOD PRESSURE: 64 MMHG | BODY MASS INDEX: 26.22 KG/M2 | TEMPERATURE: 98.1 F | HEART RATE: 87 BPM | RESPIRATION RATE: 14 BRPM | HEIGHT: 63 IN | SYSTOLIC BLOOD PRESSURE: 128 MMHG | OXYGEN SATURATION: 94 %

## 2024-05-10 DIAGNOSIS — F51.01 PRIMARY INSOMNIA: ICD-10-CM

## 2024-05-10 DIAGNOSIS — D36.9 ADENOMATOUS POLYP: ICD-10-CM

## 2024-05-10 DIAGNOSIS — Z00.00 ENCOUNTER FOR MEDICARE ANNUAL WELLNESS EXAM: Primary | ICD-10-CM

## 2024-05-10 DIAGNOSIS — E78.5 HYPERLIPIDEMIA LDL GOAL <100: ICD-10-CM

## 2024-05-10 DIAGNOSIS — Z79.899 MEDICATION MANAGEMENT: ICD-10-CM

## 2024-05-10 DIAGNOSIS — G43.019 INTRACTABLE MIGRAINE WITHOUT AURA AND WITHOUT STATUS MIGRAINOSUS: ICD-10-CM

## 2024-05-10 DIAGNOSIS — Z12.11 SCREEN FOR COLON CANCER: ICD-10-CM

## 2024-05-10 DIAGNOSIS — R42 DIZZINESS: ICD-10-CM

## 2024-05-10 DIAGNOSIS — R60.0 BILATERAL EDEMA OF LOWER EXTREMITY: ICD-10-CM

## 2024-05-10 DIAGNOSIS — E11.9 TYPE 2 DIABETES MELLITUS WITHOUT COMPLICATION, WITHOUT LONG-TERM CURRENT USE OF INSULIN (H): ICD-10-CM

## 2024-05-10 DIAGNOSIS — Z79.899 CONTROLLED SUBSTANCE AGREEMENT SIGNED: ICD-10-CM

## 2024-05-10 DIAGNOSIS — M79.7 FIBROMYALGIA: ICD-10-CM

## 2024-05-10 LAB
ERYTHROCYTE [DISTWIDTH] IN BLOOD BY AUTOMATED COUNT: 13 % (ref 10–15)
HBA1C MFR BLD: 6.2 % (ref 0–5.6)
HCT VFR BLD AUTO: 47.3 % (ref 35–47)
HGB BLD-MCNC: 15.8 G/DL (ref 11.7–15.7)
MCH RBC QN AUTO: 33.1 PG (ref 26.5–33)
MCHC RBC AUTO-ENTMCNC: 33.4 G/DL (ref 31.5–36.5)
MCV RBC AUTO: 99 FL (ref 78–100)
PLATELET # BLD AUTO: 286 10E3/UL (ref 150–450)
RBC # BLD AUTO: 4.78 10E6/UL (ref 3.8–5.2)
WBC # BLD AUTO: 7.8 10E3/UL (ref 4–11)

## 2024-05-10 PROCEDURE — 99214 OFFICE O/P EST MOD 30 MIN: CPT | Mod: 25 | Performed by: INTERNAL MEDICINE

## 2024-05-10 PROCEDURE — G0439 PPPS, SUBSEQ VISIT: HCPCS | Performed by: INTERNAL MEDICINE

## 2024-05-10 PROCEDURE — 36415 COLL VENOUS BLD VENIPUNCTURE: CPT | Performed by: INTERNAL MEDICINE

## 2024-05-10 PROCEDURE — 82043 UR ALBUMIN QUANTITATIVE: CPT | Performed by: INTERNAL MEDICINE

## 2024-05-10 PROCEDURE — G0481 DRUG TEST DEF 8-14 CLASSES: HCPCS | Performed by: INTERNAL MEDICINE

## 2024-05-10 PROCEDURE — 82570 ASSAY OF URINE CREATININE: CPT | Performed by: INTERNAL MEDICINE

## 2024-05-10 PROCEDURE — 80061 LIPID PANEL: CPT | Performed by: INTERNAL MEDICINE

## 2024-05-10 PROCEDURE — 80053 COMPREHEN METABOLIC PANEL: CPT | Performed by: INTERNAL MEDICINE

## 2024-05-10 PROCEDURE — 83036 HEMOGLOBIN GLYCOSYLATED A1C: CPT | Performed by: INTERNAL MEDICINE

## 2024-05-10 PROCEDURE — 84443 ASSAY THYROID STIM HORMONE: CPT | Performed by: INTERNAL MEDICINE

## 2024-05-10 PROCEDURE — 85027 COMPLETE CBC AUTOMATED: CPT | Performed by: INTERNAL MEDICINE

## 2024-05-10 RX ORDER — TRAMADOL HYDROCHLORIDE 50 MG/1
TABLET ORAL
Qty: 43 TABLET | Refills: 5 | Status: SHIPPED | OUTPATIENT
Start: 2024-05-10

## 2024-05-10 RX ORDER — PRAVASTATIN SODIUM 10 MG
10 TABLET ORAL DAILY
Qty: 90 TABLET | Refills: 3 | Status: SHIPPED | OUTPATIENT
Start: 2024-05-10

## 2024-05-10 RX ORDER — BUTALBITAL, ASPIRIN, AND CAFFEINE 325; 50; 40 MG/1; MG/1; MG/1
1 CAPSULE ORAL EVERY 4 HOURS PRN
Qty: 10 CAPSULE | Refills: 0 | Status: SHIPPED | OUTPATIENT
Start: 2024-05-10

## 2024-05-10 RX ORDER — RESPIRATORY SYNCYTIAL VIRUS VACCINE 120MCG/0.5
0.5 KIT INTRAMUSCULAR ONCE
Qty: 1 EACH | Refills: 0 | Status: CANCELLED | OUTPATIENT
Start: 2024-05-10 | End: 2024-05-10

## 2024-05-10 RX ORDER — TOPIRAMATE 25 MG/1
TABLET, FILM COATED ORAL
Qty: 400 TABLET | Refills: 3 | Status: SHIPPED | OUTPATIENT
Start: 2024-05-10

## 2024-05-10 RX ORDER — TRAZODONE HYDROCHLORIDE 50 MG/1
50 TABLET, FILM COATED ORAL AT BEDTIME
Qty: 90 TABLET | Refills: 3 | Status: SHIPPED | OUTPATIENT
Start: 2024-05-10

## 2024-05-10 RX ORDER — HYDROCHLOROTHIAZIDE 50 MG/1
TABLET ORAL
Qty: 90 TABLET | Refills: 3 | Status: SHIPPED | OUTPATIENT
Start: 2024-05-10

## 2024-05-10 SDOH — HEALTH STABILITY: PHYSICAL HEALTH: ON AVERAGE, HOW MANY DAYS PER WEEK DO YOU ENGAGE IN MODERATE TO STRENUOUS EXERCISE (LIKE A BRISK WALK)?: 0 DAYS

## 2024-05-10 SDOH — HEALTH STABILITY: PHYSICAL HEALTH: ON AVERAGE, HOW MANY MINUTES DO YOU ENGAGE IN EXERCISE AT THIS LEVEL?: 0 MIN

## 2024-05-10 ASSESSMENT — SOCIAL DETERMINANTS OF HEALTH (SDOH): HOW OFTEN DO YOU GET TOGETHER WITH FRIENDS OR RELATIVES?: TWICE A WEEK

## 2024-05-10 NOTE — PATIENT INSTRUCTIONS
"Preventive Care Advice   This is general advice we often give to help people stay healthy. Your care team may have specific advice just for you. Please talk to your care team about your own preventive care needs.  Lifestyle  Exercise at least 150 minutes each week (30 minutes a day, 5 days a week).  Do muscle strengthening activities 2 days a week. These help control your weight and prevent disease.  No smoking.  Wear sunscreen to prevent skin cancer.  Have your home tested for radon every 2 to 5 years. Radon is a colorless, odorless gas that can harm your lungs. To learn more, go to www.health.[facility].mn. and search for \"Radon in Homes.\"  Keep guns unloaded and locked up in a safe place like a safe or gun vault, or, use a gun lock and hide the keys. Always lock away bullets separately. To learn more, visit [place].gov and search for \"safe gun storage.\"  Nutrition  Eat 5 or more servings of fruits and vegetables each day.  Try wheat bread, brown rice and whole grain pasta (instead of white bread, rice, and pasta).  Get enough calcium and vitamin D. Check the label on foods and aim for 100% of the RDA (recommended daily allowance).  Regular exams  Have a dental exam and cleaning every 6 months.  See your health care team every year to talk about:  Any changes in your health.  Any medicines your care team has prescribed.  Preventive care, family planning, and ways to prevent chronic diseases.  Shots (vaccines)   HPV shots (up to age 26), if you've never had them before.  Hepatitis B shots (up to age 59), if you've never had them before.  COVID-19 shot: Get this shot when it's due.  Flu shot: Get a flu shot every year.  Tetanus shot: Get a tetanus shot every 10 years.  Pneumococcal, hepatitis A, and RSV shots: Ask your care team if you need these based on your risk.  Shingles shot (for age 50 and up).  General health tests  Diabetes screening:  Starting at age 35, Get screened for diabetes at least every 3 years.  If " you are younger than age 35, ask your care team if you should be screened for diabetes.  Cholesterol test: At age 39, start having a cholesterol test every 5 years, or more often if advised.  Bone density scan (DEXA): At age 50, ask your care team if you should have this scan for osteoporosis (brittle bones).  Hepatitis C: Get tested at least once in your life.  Abdominal aortic aneurysm screening: Talk to your doctor about having this screening if you:  Have ever smoked; and  Are biologically male; and  Are between the ages of 65 and 75.  STIs (sexually transmitted infections)  Before age 24: Ask your care team if you should be screened for STIs.  After age 24: Get screened for STIs if you're at risk. You are at risk for STIs (including HIV) if:  You are sexually active with more than one person.  You don't use condoms every time.  You or a partner was diagnosed with a sexually transmitted infection.  If you are at risk for HIV, ask about PrEP medicine to prevent HIV.  Get tested for HIV at least once in your life, whether you are at risk for HIV or not.  Cancer screening tests  Cervical cancer screening: If you have a cervix, begin getting regular cervical cancer screening tests at age 21. Most people who have regular screenings with normal results can stop after age 65. Talk about this with your provider.  Breast cancer scan (mammogram): If you've ever had breasts, begin having regular mammograms starting at age 40. This is a scan to check for breast cancer.  Colon cancer screening: It is important to start screening for colon cancer at age 45.  Have a colonoscopy test every 10 years (or more often if you're at risk) Or, ask your provider about stool tests like a FIT test every year or Cologuard test every 3 years.  To learn more about your testing options, visit: www.[employer]/474401.pdf.  For help making a decision, visit: veronique/oe99539.  Prostate cancer screening test: If you have a prostate and are age 55  to 69, ask your provider if you would benefit from a yearly prostate cancer screening test.  Lung cancer screening: If you are a current or former smoker age 50 to 80, ask your care team if ongoing lung cancer screenings are right for you.  For informational purposes only. Not to replace the advice of your health care provider. Copyright   2023 [employer]. All rights reserved. Clinically reviewed by the [facility]. "[employer] 389439 - REV 04/24.    Learning About Stress  What is stress?     Stress is your body's response to a hard situation. Your body can have a physical, emotional, or mental response. Stress is a fact of life for most people, and it affects everyone differently. What causes stress for you may not be stressful for someone else.  A lot of things can cause stress. You may feel stress when you go on a job interview, take a test, or run a race. This kind of short-term stress is normal and even useful. It can help you if you need to work hard or react quickly. For example, stress can help you finish an important job on time.  Long-term stress is caused by ongoing stressful situations or events. Examples of long-term stress include long-term health problems, ongoing problems at work, or conflicts in your family. Long-term stress can harm your health.  How does stress affect your health?  When you are stressed, your body responds as though you are in danger. It makes hormones that speed up your heart, make you breathe faster, and give you a burst of energy. This is called the fight-or-flight stress response. If the stress is over quickly, your body goes back to normal and no harm is done.  But if stress happens too often or lasts too long, it can have bad effects. Long-term stress can make you more likely to get sick, and it can make symptoms of some diseases worse. If you tense up when you are stressed, you may develop neck, shoulder, or low back pain. Stress is  linked to high blood pressure and heart disease.  Stress also harms your emotional health. It can make you caro, tense, or depressed. Your relationships may suffer, and you may not do well at work or school.  What can you do to manage stress?  You can try these things to help manage stress:   Do something active. Exercise or activity can help reduce stress. Walking is a great way to get started. Even everyday activities such as housecleaning or yard work can help.  Try yoga or cr chi. These techniques combine exercise and meditation. You may need some training at first to learn them.  Do something you enjoy. For example, listen to music or go to a movie. Practice your hobby or do volunteer work.  Meditate. This can help you relax, because you are not worrying about what happened before or what may happen in the future.  Do guided imagery. Imagine yourself in any setting that helps you feel calm. You can use online videos, books, or a teacher to guide you.  Do breathing exercises. For example:  From a standing position, bend forward from the waist with your knees slightly bent. Let your arms dangle close to the floor.  Breathe in slowly and deeply as you return to a standing position. Roll up slowly and lift your head last.  Hold your breath for just a few seconds in the standing position.  Breathe out slowly and bend forward from the waist.  Let your feelings out. Talk, laugh, cry, and express anger when you need to. Talking with supportive friends or family, a counselor, or a krystian leader about your feelings is a healthy way to relieve stress. Avoid discussing your feelings with people who make you feel worse.  Write. It may help to write about things that are bothering you. This helps you find out how much stress you feel and what is causing it. When you know this, you can find better ways to cope.  What can you do to prevent stress?  You might try some of these things to help prevent stress:  Manage your time.  "This helps you find time to do the things you want and need to do.  Get enough sleep. Your body recovers from the stresses of the day while you are sleeping.  Get support. Your family, friends, and community can make a difference in how you experience stress.  Limit your news feed. Avoid or limit time on social media or news that may make you feel stressed.  Do something active. Exercise or activity can help reduce stress. Walking is a great way to get started.  Where can you learn more?  Go to https://www.[employer].net/patiented  Enter N032 in the search box to learn more about \"Learning About Stress.\"  Current as of: October 24, 2023               Content Version: 14.0    0712-9066 [employer].   Care instructions adapted under license by your healthcare professional. If you have questions about a medical condition or this instruction, always ask your healthcare professional. [employer] disclaims any warranty or liability for your use of this information.      Chronic Pain: Care Instructions  Your Care Instructions     Chronic pain is pain that lasts a long time (months or even years) and may or may not have a clear cause. It is different from acute pain, which usually does have a clear cause--like an injury or illness--and gets better over time. Chronic pain:  Lasts over time but may vary from day to day.  Does not go away despite efforts to end it.  May disrupt your sleep and lead to fatigue.  May cause depression or anxiety.  May make your muscles tense, causing more pain.  Can disrupt your work, hobbies, home life, and relationships with friends and family.  Chronic pain is a very real condition. It is not just in your head. Treatment can help and usually includes several methods used together, such as medicines, physical therapy, exercise, and other treatments. Learning how to relax and changing negative thought patterns can also help you cope.  Chronic pain is complex. Taking an " active role in your treatment will help you better manage your pain. Tell your doctor if you have trouble dealing with your pain. You may have to try several things before you find what works best for you.  Follow-up care is a key part of your treatment and safety. Be sure to make and go to all appointments, and call your doctor if you are having problems. It's also a good idea to know your test results and keep a list of the medicines you take.  How can you care for yourself at home?  Pace yourself. Break up large jobs into smaller tasks. Save harder tasks for days when you have less pain, or go back and forth between hard tasks and easier ones. Take rest breaks.  Relax, and reduce stress. Relaxation techniques such as deep breathing or meditation can help.  Keep moving. Gentle, daily exercise can help reduce pain over the long run. Try low- or no-impact exercises such as walking, swimming, and stationary biking. Do stretches to stay flexible.  Try heat, cold packs, and massage.  Get enough sleep. Chronic pain can make you tired and drain your energy. Talk with your doctor if you have trouble sleeping because of pain.  Think positive. Your thoughts can affect your pain level. Do things that you enjoy to distract yourself when you have pain instead of focusing on the pain. See a movie, read a book, listen to music, or spend time with a friend.  If you think you are depressed, talk to your doctor about treatment.  Keep a daily pain diary. Record how your moods, thoughts, sleep patterns, activities, and medicine affect your pain. You may find that your pain is worse during or after certain activities or when you are feeling a certain emotion. Having a record of your pain can help you and your doctor find the best ways to treat your pain.  Take pain medicines exactly as directed.  If the doctor gave you a prescription medicine for pain, take it as prescribed.  If you are not taking a prescription pain medicine, ask  "your doctor if you can take an over-the-counter medicine.  Reducing constipation caused by pain medicine  Talk to your doctor about a laxative. If a laxative doesn't work, your doctor may suggest a prescription medicine.  Include fruits, vegetables, beans, and whole grains in your diet each day. These foods are high in fiber.  If your doctor recommends it, get more exercise. Walking is a good choice. Bit by bit, increase the amount you walk every day. Try for at least 30 minutes on most days of the week.  Schedule time each day for a bowel movement. A daily routine may help. Take your time and do not strain when having a bowel movement.  When should you call for help?   Call your doctor now or seek immediate medical care if:    Your pain gets worse or is out of control.     You feel down or blue, or you do not enjoy things like you once did. You may be depressed, which is common in people with chronic pain. Depression can be treated.     You have vomiting or cramps for more than 2 hours.   Watch closely for changes in your health, and be sure to contact your doctor if:    You cannot sleep because of pain.     You are very worried or anxious about your pain.     You have trouble taking your pain medicine.     You have any concerns about your pain medicine.     You have trouble with bowel movements, such as:  No bowel movement in 3 days.  Blood in the anal area, in your stool, or on the toilet paper.  Diarrhea for more than 24 hours.   Where can you learn more?  Go to https://www.[employer].net/patiented  Enter N004 in the search box to learn more about \"Chronic Pain: Care Instructions.\"  Current as of: July 10, 2023               Content Version: 14.0    0695-4002 [employer].   Care instructions adapted under license by your healthcare professional. If you have questions about a medical condition or this instruction, always ask your healthcare professional. [employer] disclaims any " warranty or liability for your use of this information.      Patient Instructions   Someone will contact you to help you to schedule a colonoscopy.     Okay to use imodium as needed.   Let me know if dizziness becomes a recurrent problem.    Everything looks fine!    Refills of medications have been faxed to your pharmacy.     Go down to [address] to get labs done.   Lab results will be on [facility].     See me in November 2024 before you go to [place], sooner if problems.

## 2024-05-10 NOTE — PROGRESS NOTES
Preventive Care Visit  [facility]  Eric Love MD, Internal Medicine  May 10, 2024      Assessment & Plan   (Z00.00) Encounter for Medicare annual wellness exam  (primary encounter diagnosis)  Comment: Stable health. See epic orders.     (R42) Dizziness  Comment: No obvious CNS or cardiovascular pathology. Reassurance, will defer any further workup at this time.   Plan: OFFICE/OUTPT VISIT,EST,LEVL IV          (E11.9) Type 2 diabetes mellitus without complication, without long-term current use of insulin (H)  Comment: Historically Well controlled. Continue current meds.   Plan: HEMOGLOBIN A1C, Albumin Random Urine         Quantitative with Creat Ratio, OFFICE/OUTPT         VISIT,EST,LEVL IV          (E78.5) Hyperlipidemia LDL goal <100  Comment: Update labs. Continue current meds.   Plan: Lipid panel reflex to direct LDL Fasting,         Comprehensive metabolic panel, pravastatin         (PRAVACHOL) 10 MG tablet, OFFICE/OUTPT         VISIT,EST,LEVL IV          (Z79.899) Controlled substance agreement signed (5/10/2024)  Plan: [id number] - Urine Drug Confirmation Panel         (Comprehensive), OFFICE/OUTPT VISIT,EST,LEVL IV          (F51.01) Primary insomnia  Comment: Well controlled. Continue current meds.   Plan: traZODone (DESYREL) 50 MG tablet, OFFICE/OUTPT         VISIT,EST,LEVL IV          (M79.7) Fibromyalgia  Comment: Chronic symptoms controlled on current tramadol regimen. Continue current meds.   Plan: traMADol (ULTRAM) 50 MG tablet, OFFICE/OUTPT         VISIT,EST,LEVL IV          (G43.019) Intractable migraine without aura and without status migrainosus  Comment: Continue Topamax and as needed butalbital.   Plan: topiramate (TOPAMAX) 25 MG tablet,         butalbital-aspirin-caffeine (FIORINAL)         -40 MG capsule, OFFICE/OUTPT         VISIT,EST,LEVL IV          (R60.0) Bilateral edema of lower extremity  Comment: Patient continues on chronic hydrochlorothiazide reported to  "be for edema rather than for hypertension.   Plan: hydrochlorothiazide (HYDRODIURIL) 50 MG tablet,        TSH with free T4 reflex, OFFICE/OUTPT         VISIT,EST,LEVL IV          (Z79.899) Medication management  Plan: TSH with free T4 reflex, CBC with platelets          (D36.9) Adenomatous polyp  (Z12.11) Screen for colon cancer  Plan: Colonoscopy Screening  Referral            Patient has been advised of split billing requirements and indicates understanding: Yes        BMI  Estimated body mass index is 26.22 kg/m  as calculated from the following:    Height as of this encounter: 1.6 m (5' 3\").    Weight as of this encounter: 67.1 kg (148 lb).       Counseling  Appropriate preventive services were discussed with this patient, including applicable screening as appropriate for fall prevention, nutrition, physical activity, Tobacco-use cessation, weight loss and cognition.  Checklist reviewing preventive services available has been given to the patient.  Reviewed patient's diet, addressing concerns and/or questions.   She is at risk for psychosocial distress and has been provided with information to reduce risk.   I have reviewed Opioid Use Disorder and Substance Use Disorder risk factors and made any needed referrals.         Patient Instructions   Someone will contact you to help you to schedule a colonoscopy.     Okay to use imodium as needed.   Let me know if dizziness becomes a recurrent problem.    Everything looks fine!    Refills of medications have been faxed to your pharmacy.     Go down to [address] to get labs done.   Lab results will be on [employer].     See me in November 2024 before you go to [place], sooner if problems.          Naveen Sanchez is a 83 year old, presenting for the following:  Medicare Visit        5/10/2024    11:15 AM   Additional Questions   Roomed by Becky BIRD CMA         Health Care Directive  Patient has a Health Care Directive on file  Advance care planning document is on " "file and is current.    HPI  In addition to an Annual Wellness Exam, we addressed chronic neck/shoulders/upper back pain, migraines, diabetes mellitus.     We reviewed and updated a Controlled Substance Agreement today. She continues to take a tramadol tablet once each morning, and takes a second pill every Tuesday, Thursday and Saturday evening (dates on which she goes to the health club).     Despite her history of chronic musculoskeletal pain she is willing to try starting low-dose pravastatin.      She takes Fiorinal infrequently for migraines. She takes Topamax daily for headache prevention.      She has a history of irritable bowel symptoms and takes imodium prn for diarrhea.     She is advised that her A1c from last September reached diabetes criteria, which is 6.5 or above. Advised diabetes education, and rechecking today and again in six months.      She reports taking hydrochlorothiazide for edema, not for hypertension.     She noted having a \"dizzy spell\" her last day in [place], [place]. She woke up very dizzy when bending forward, which was required frequently as she was packing and cleaning in preparation for returning home. Symptoms had resolved upon awakening the next morning.             5/10/2024   General Health   How would you rate your overall physical health? Good   Feel stress (tense, anxious, or unable to sleep) Only a little   (!) STRESS CONCERN      5/10/2024   Nutrition   Diet: Regular (no restrictions)    Low salt         5/10/2024   Exercise   Days per week of moderate/strenous exercise 0 days   Average minutes spent exercising at this level 0 min   (!) EXERCISE CONCERN      5/10/2024   Social Factors   Frequency of gathering with friends or relatives Twice a week   Worry food won't last until get money to buy more No   Food not last or not have enough money for food? No   Do you have housing?  Yes   Are you worried about losing your housing? No   Lack of transportation? No   Unable to get " utilities (heat,electricity)? No         5/10/2024   Fall Risk   Fallen 2 or more times in the past year? No   Trouble with walking or balance? No          5/10/2024   Activities of Daily Living- Home Safety   Needs help with the following daily activites None of the above   Safety concerns in the home None of the above         5/10/2024   Dental   Dentist two times every year? Yes         5/10/2024   Hearing Screening   Hearing concerns? None of the above         5/10/2024   Driving Risk Screening   Patient/family members have concerns about driving No         5/10/2024   General Alertness/Fatigue Screening   Have you been more tired than usual lately? No         5/10/2024   Urinary Incontinence Screening   Bothered by leaking urine in past 6 months No         5/10/2024   TB Screening   Were you born outside of the [place]? No         Today's PHQ-2 Score:       5/10/2024    11:14 AM   PHQ-2 (  [employer])   Q1: Little interest or pleasure in doing things 0   Q2: Feeling down, depressed or hopeless 0   PHQ-2 Score 0   Q1: Little interest or pleasure in doing things Not at all   Q2: Feeling down, depressed or hopeless Not at all   PHQ-2 Score 0           5/10/2024   Substance Use   Alcohol more than 3/day or more than 7/wk No   Do you have a current opioid prescription? (!) YES   How severe/bad is pain from 1 to 10? 3/10   Do you use any other substances recreationally? No       Social History     Tobacco Use    Smoking status: Former     Current packs/day: 0.00     Types: Cigarettes     Quit date: 10/1/1981     Years since quittin.6     Passive exposure: Never    Smokeless tobacco: Never   Vaping Use    Vaping status: Never Used   Substance Use Topics    Alcohol use: Not Currently     Comment: social    Drug use: No           2023   LAST FHS-7 RESULTS   1st degree relative breast or ovarian cancer No   Any relative bilateral breast cancer No   Any male have breast cancer No   Any ONE woman have BOTH breast AND  ovarian cancer No   Any woman with breast cancer before 50yrs No   2 or more relatives with breast AND/OR ovarian cancer No   2 or more relatives with breast AND/OR bowel cancer No          Mammogram Screening - After age 74- determine frequency with patient based on health status, life expectancy and patient goals            Reviewed and updated as needed this visit by Provider                    Past medical, family and social histories as well as medications reviewed and updated as needed.    Current providers sharing in care for this patient include:  Patient Care Team:  Eric Love MD as PCP - General (Internal Medicine)  Eric Love MD as Assigned PCP  Eric Love MD as Assigned Pain Medication Provider  Josiah Mccabe PA-C as Assigned Musculoskeletal Provider  Hillary Peoples RD as Diabetes Educator (Diabetes Education)    The following health maintenance items are reviewed in Epic and correct as of today:  Health Maintenance   Topic Date Due    ZOSTER IMMUNIZATION (1 of 2) Never done    RSV VACCINE (Pregnancy & 60+) (1 - 1-dose 60+ series) Never done    COVID-19 Vaccine (7 - 2023-24 season) 02/12/2024    A1C  02/14/2024    MICROALBUMIN  05/09/2024    DIABETIC FOOT EXAM  05/09/2024    URINE DRUG SCREEN  05/09/2024    COLORECTAL CANCER SCREENING  03/11/2024    MEDICARE ANNUAL WELLNESS VISIT  05/09/2024    BMP  11/14/2024    LIPID  11/14/2024    ANNUAL REVIEW OF HM ORDERS  11/21/2024    EYE EXAM  12/08/2024    FALL RISK ASSESSMENT  05/10/2025    ADVANCE CARE PLANNING  05/09/2028    DTAP/TDAP/TD IMMUNIZATION (2 - Td or Tdap) 09/13/2032    DEXA  07/06/2037    PHQ-2 (once per calendar year)  Completed    INFLUENZA VACCINE  Completed    Pneumococcal Vaccine: 65+ Years  Completed    IPV IMMUNIZATION  Aged Out    HPV IMMUNIZATION  Aged Out    MENINGITIS IMMUNIZATION  Aged Out    RSV MONOCLONAL ANTIBODY  Aged Out       REVIEW OF SYSTEMS: The following systems have been completely reviewed and  "are negative except as noted above:   Constitutional, HEENT, respiratory, cardiovascular, gastrointestinal, genitourinary, musculoskeletal, dermatologic, hematologic, endocrine, psychiatric, and neurologic systems.       Objective    Exam  /64 (BP Location: Right arm, Patient Position: Sitting, Cuff Size: Adult Regular)   Pulse 87   Temp 98.1  F (36.7  C) (Tympanic)   Resp 14   Ht 1.6 m (5' 3\")   Wt 67.1 kg (148 lb)   LMP  (LMP Unknown)   SpO2 94%   Breastfeeding No   BMI 26.22 kg/m     Estimated body mass index is 26.22 kg/m  as calculated from the following:    Height as of this encounter: 1.6 m (5' 3\").    Weight as of this encounter: 67.1 kg (148 lb).    Physical Exam  GENERAL: healthy, alert and no distress  EYES: Eyes grossly normal to inspection, PERRL and conjunctivae and sclerae normal  HENT: ear canals and TM's normal, nose and mouth without ulcers or lesions  NECK: no adenopathy, no asymmetry, masses, or scars and thyroid normal to palpation  RESP: lungs clear to auscultation - no rales, rhonchi or wheezes  BREAST/PELVIC: exams not performed.  CV: regular rate and rhythm, normal S1 S2, no S3 or S4, no murmur, click or rub, no peripheral edema and peripheral pulses strong  ABDOMEN: soft, nontender, no hepatosplenomegaly, no masses and bowel sounds normal  MS: no gross musculoskeletal defects noted, no edema  SKIN: no suspicious lesions or rashes  NEURO: Normal strength and tone, mentation intact and speech normal  PSYCH: mentation appears normal, affect normal/bright          5/10/2024   Mini Cog   Clock Draw Score 2 Normal   3 Item Recall 3 objects recalled   Mini Cog Total Score 5              Signed Electronically by: Eric Love MD, MD    "

## 2024-05-10 NOTE — LETTER

## 2024-05-11 LAB
ALBUMIN SERPL BCG-MCNC: 4.5 G/DL (ref 3.5–5.2)
ALP SERPL-CCNC: 94 U/L (ref 40–150)
ALT SERPL W P-5'-P-CCNC: 59 U/L (ref 0–50)
ANION GAP SERPL CALCULATED.3IONS-SCNC: 12 MMOL/L (ref 7–15)
AST SERPL W P-5'-P-CCNC: 49 U/L (ref 0–45)
BILIRUB SERPL-MCNC: 0.5 MG/DL
BUN SERPL-MCNC: 17.9 MG/DL (ref 8–23)
CALCIUM SERPL-MCNC: 10.3 MG/DL (ref 8.8–10.2)
CHLORIDE SERPL-SCNC: 100 MMOL/L (ref 98–107)
CHOLEST SERPL-MCNC: 200 MG/DL
CREAT SERPL-MCNC: 0.78 MG/DL (ref 0.51–0.95)
CREAT UR-MCNC: 137 MG/DL
CREAT UR-MCNC: 137 MG/DL
DEPRECATED HCO3 PLAS-SCNC: 31 MMOL/L (ref 22–29)
EGFRCR SERPLBLD CKD-EPI 2021: 75 ML/MIN/1.73M2
FASTING STATUS PATIENT QL REPORTED: YES
FASTING STATUS PATIENT QL REPORTED: YES
GLUCOSE SERPL-MCNC: 135 MG/DL (ref 70–99)
HDLC SERPL-MCNC: 77 MG/DL
LDLC SERPL CALC-MCNC: 92 MG/DL
MICROALBUMIN UR-MCNC: 16.1 MG/L
MICROALBUMIN/CREAT UR: 11.75 MG/G CR (ref 0–25)
NONHDLC SERPL-MCNC: 123 MG/DL
POTASSIUM SERPL-SCNC: 3.7 MMOL/L (ref 3.4–5.3)
PROT SERPL-MCNC: 7.3 G/DL (ref 6.4–8.3)
SODIUM SERPL-SCNC: 143 MMOL/L (ref 135–145)
TRIGL SERPL-MCNC: 154 MG/DL
TSH SERPL DL<=0.005 MIU/L-ACNC: 0.82 UIU/ML (ref 0.3–4.2)

## 2024-05-14 LAB
N-NORTRAMADOL/CREAT UR CFM: ABNORMAL NG/MG{CREAT}
O-NORTRAMADOL UR CFM-MCNC: ABNORMAL NG/ML
TRAMADOL CTO UR CFM-MCNC: 3240 NG/ML
TRAMADOL/CREAT UR: 2365 NG/MG {CREAT}

## 2024-06-01 ENCOUNTER — TRANSFERRED RECORDS (OUTPATIENT)
Dept: MULTI SPECIALTY CLINIC | Facility: CLINIC | Age: 84
End: 2024-06-01

## 2024-06-01 LAB — RETINOPATHY: NORMAL

## 2024-06-17 PROBLEM — Z71.89 ADVANCED DIRECTIVES, COUNSELING/DISCUSSION: Status: RESOLVED | Noted: 2017-10-24 | Resolved: 2024-06-17

## 2024-06-21 ENCOUNTER — HOSPITAL ENCOUNTER (OUTPATIENT)
Facility: CLINIC | Age: 84
End: 2024-06-21
Attending: SURGERY | Admitting: SURGERY
Payer: COMMERCIAL

## 2024-06-21 ENCOUNTER — TELEPHONE (OUTPATIENT)
Dept: GASTROENTEROLOGY | Facility: CLINIC | Age: 84
End: 2024-06-21
Payer: COMMERCIAL

## 2024-06-21 DIAGNOSIS — Z12.11 SPECIAL SCREENING FOR MALIGNANT NEOPLASMS, COLON: Primary | ICD-10-CM

## 2024-06-21 NOTE — TELEPHONE ENCOUNTER
"Endoscopy Scheduling Screen    Have you had a positive Covid test in the last 14 days?  No    What is your communication preference for Instructions and/or Bowel Prep?   MyChart    What insurance is in the chart?  Other:  [facility]/medicare    Ordering/Referring Provider:     CHINYERE EASTON      (If ordering provider performs procedure, schedule with ordering provider unless otherwise instructed. )    BMI: Estimated body mass index is 26.22 kg/m  as calculated from the following:    Height as of 5/10/24: 1.6 m (5' 3\").    Weight as of 5/10/24: 67.1 kg (148 lb).     Sedation Ordered  moderate sedation.   If patient BMI > 50 do not schedule in [facility].    If patient BMI > 45 do not schedule at [facility].    Are you taking methadone or Suboxone?  No    Have you had difficulties, pain, or discomfort during past endoscopy procedures?  No    Are you taking any prescription medications for pain 3 or more times per week?   NO, No RN review required.    Do you have a history of malignant hyperthermia?  No    (Females) Are you currently pregnant?   No     Have you been diagnosed or told you have pulmonary hypertension?   No    Do you have an LVAD?  No    Have you been told you have moderate to severe sleep apnea?  No    Have you been told you have COPD, asthma, or any other lung disease?  No    Do you have any heart conditions?  No     Have you ever had or are you waiting for an organ transplant?  No. Continue scheduling, no site restrictions.    Have you had a stroke or transient ischemic attack (TIA aka \"mini stroke\" in the last 6 months?   No    Have you been diagnosed with or been told you have cirrhosis of the liver?   No    Are you currently on dialysis?   No    Do you need assistance transferring?   No    BMI: Estimated body mass index is 26.22 kg/m  as calculated from the following:    Height as of 5/10/24: 1.6 m (5' 3\").    Weight as of 5/10/24: 67.1 kg (148 lb).     Is patients BMI > 40 and scheduling location [facility]?  No    Do you " take an injectable medication for weight loss or diabetes (excluding insulin)?  No    Do you take the medication Naltrexone?  No    Do you take blood thinners?  No       Prep   Are you currently on dialysis or do you have chronic kidney disease?  No    Do you have a diagnosis of diabetes?  Yes (Golytely Prep)    Do you have a diagnosis of cystic fibrosis (CF)?  No    On a regular basis do you go 3 -5 days between bowel movements?  No    BMI > 40?  No    Preferred Pharmacy:      [facility] [address] [place] [address] [id number]  [address] [facility] [address]  Phone: [phone] Fax: [phone]      Final Scheduling Details     Procedure scheduled  Colonoscopy    Surgeon:  Brad     Date of procedure:  8/22     Pre-OP / PAC:   No - Not required for this site.    Location  [facility]    Sedation   Moderate Sedation - Per order.      Patient Reminders:   You will receive a call from a Nurse to review instructions and health history.  This assessment must be completed prior to your procedure.  Failure to complete the Nurse assessment may result in the procedure being cancelled.      On the day of your procedure, please designate an adult(s) who can drive you home stay with you for the next 24 hours. The medicines used in the exam will make you sleepy. You will not be able to drive.      You cannot take public transportation, ride share services, or non-medical taxi service without a responsible caregiver.  Medical transport services are allowed with the requirement that a responsible caregiver will receive you at your destination.  We require that drivers and caregivers are confirmed prior to your procedure.

## 2024-08-01 RX ORDER — BISACODYL 5 MG/1
TABLET, DELAYED RELEASE ORAL
Qty: 4 TABLET | Refills: 0 | Status: SHIPPED | OUTPATIENT
Start: 2024-08-01

## 2024-08-01 NOTE — TELEPHONE ENCOUNTER
Extended Golytely Bowel Prep  recommended due to chronic pain medication noted in chart.  and diabetes.  Instructions were sent via [employer]. Bowel prep was sent 8/1/2024 to [id number] - [place], [place] - [address]

## 2024-08-13 RX ORDER — LIDOCAINE 40 MG/G
CREAM TOPICAL
Status: CANCELLED | OUTPATIENT
Start: 2024-08-13

## 2024-08-13 RX ORDER — ONDANSETRON 2 MG/ML
4 INJECTION INTRAMUSCULAR; INTRAVENOUS
Status: CANCELLED | OUTPATIENT
Start: 2024-08-13

## 2024-08-14 ENCOUNTER — TELEPHONE (OUTPATIENT)
Dept: GASTROENTEROLOGY | Facility: CLINIC | Age: 84
End: 2024-08-14
Payer: COMMERCIAL

## 2024-08-14 NOTE — TELEPHONE ENCOUNTER
Patient has 2 orders for colonoscopy one is for MAC with Dr. Del Angel and the other is for CS.  Patient does take Tramadol daily so should be MAC.     Sent to scheduling to reschedule with MAC but to also verify if this should be scheduled with Dr. Del Angel.     Lidia Bruno RN

## 2024-08-14 NOTE — LETTER
September 13, 2024      Nubia Coronel  [address] [facility] [address]              Colonoscopy     Procedure date: 9/20/24    Anticipated arrival time: 10:30 AM   (Please note that arrival times may change)    Facility [facility]: [facility]; [address] [facility], 5th Floor, [place] [address] - Check in location: 5th Floor. Parking information: Self pay parking is available in West Lot located directly across from the main entrance of the [facility]. Entry and exit to this lot is on [facility]. In addition, self pay parking is available in [address] which is located west of [facility]. Follow the center wilmer designated ' [place]' to ground-level spaces that are reserved for patients. Please disregard any signage indicating the ramp is full or available by reservation only as this does not pertain to the spaces dedicated for patients coming to the clinic.  services are available for patients with limited mobility. Services available Monday-Friday, 7:00a.m.-  5:00p.m.    Important Procedure Reminders:     Prep Instructions:   Instructions on how to prepare for your upcoming procedure are found below. Please read instructions carefully. Deviation from instructions may result in less than desired outcomes and procedure may need to be rescheduled.   If you have additional questions regarding how to prepare for your upcoming procedure, contact our endoscopy pre assessment nurses at [phone] option 2 Monday through Friday 7:00am-5:00pm.      Policy:   The medications used during the procedure will make you sleepy, so you won't be able to drive. On the day of your procedure, please have an adult ready to drive you home and stay with you for the next 24 hours.   You can't use public transportation, ride-share services, or non-medical taxi services without a responsible caregiver. Medical transport services are okay, but a caregiver must be there to receive  you at your destination.   Make sure your  and caregiver are confirmed before your procedure.    Day of procedure:  Please keep in mind that arrival times may change. Let your  know there might be a one-hour window for changes.  We ask that you please check in at the  with your . Your  should remain on campus.  Expect to be at the procedure center for about 1.5-2.5 hours.    Please do not wear jewelry (i.e. earrings, rings, necklaces, watches, etc). Leave your purse, billfold, credit cards, and other valuables at home.   Bring insurance card and ID.     To cancel or reschedule your procedure:   If you need to cancel or reschedule, our endoscopy scheduling team can be reached at [phone], option 2. Monday through Friday, 7:00am-5:00pm.    Medication Reminders:    Please note the following medication holding recommendations:   N/A    ---------------------------------------------------------------------------------------------------------------------------------------------------------------------------------------------------------------      Your colonoscopy prep prescription has [facility]    [facility] [address] [place], [place] - [address]      Golytely Extended Bowel Prep   Prep instructions for your colonoscopy   For prep questions, please call: [facility] [address], 5th Floor, [place] [address] - [phone] option 2    Please read these instructions carefully at least 7 days prior to your colonoscopy procedure. Be sure to follow all directions completely. The inside of your colon must be clean to allow for a complete examination for the presence of any growths, polyps, and/or abnormalities, as well as their biopsy or removal. A number of tips are included in order to make this part of the procedure as comfortable as possible.    Getting ready      prescription at the pharmacy. Endoscopy team will order this about 2 weeks before  to your scheduled procedure. Included in the kit will be the followin  Dulcolax (Bisacodyl) 5mg tablets  Two containers of Polyethylene glycol (Golytely, Colyte, Nulytely, Gavilyte-G)    A nurse will call you to go over your appointment details and prep instructions. Not completing the nurse call could result with your appointment being cancelled.    You must arrange for an adult to drive you home after your exam. Your colonoscopy cannot be done unless you have a ride. If you need to use public transportation, someone must ride with you and stay with you for up to 24 hours.       7 days before procedure     Consult with your prescribing provider about stopping any:     Diabetic/Weight Loss Injectable Medication GLP-1 agonist (such as Exenatide (Byetta, Bydureon),  Mounjaro (Tirzepatide).  Ozempic (Semaglutide). Semaglutide. Symlin (Pamlintide), Tanzeum (Albiglutide). Tirzepatide-Weight Management (Zepbound), Trulicity (Dulaglutide), Victoza (Saxenda, Liraglutide), Wegovy (Semaglutide) please follow below guidelines for holding:    For weekly injection HOLD 7 days before procedure.  For once or twice a day injection HOLD the day before procedure and day of procedure.  For oral, daily dosing (Rybelsus) HOLD 7 days before procedure.    Blood thinning and/or anti-platelet medications: such as Coumadin, Plavix, Xarelto, Eliquis, Lovenox or others, these medications may need to be stopped temporarily before your procedure.     If you take insulin for diabetes, ask your prescribing provider for instructions on how to manage this medication while preparing for a colonoscopy.      Stop taking iron (ferrous sulfate), multivitamins that contain iron, and/or fiber supplements (Metamucil, Benefiber, Psyllium husk powder, Fibercon, Bran, etc.).      Stop eating whole kernel corn, popcorn, nuts, and foods that contain seeds. These can stay in the colon for many days, and they can clog up the colonoscope.    Begin a  low-fiber diet. (See examples below). No Olestra (a fat substitute).   Consume no more than 10-15 grams of fiber each day.         LOW FIBER DIET   You may have: Do not have:    Starches: White bread, rolls, biscuits, croissants, Sue toast, white flour tortillas, waffles, pancakes, Romansh toast; white rice, noodles, pasta, macaroni; cooked and peeled potatoes; plain crackers, saltines; cooked farina or cream of rice; puffed rice, corn flakes, Rice Krispies, Special K      Vegetables: tender cooked and canned, vegetable broths     Fruits and fruit juices: Strained fruit juice, canned fruit without seeds or skin (not pineapple), applesauce, pear sauce, ripe bananas, melons (not watermelon)     Milk products: Milk (plain or flavored), cheese, cottage cheese, yogurt (no berries), custard, ice cream       Proteins: Tender, well-cooked ground beef, lamb, veal, ham, pork, chicken, turkey, fish or organ meat, Tofu, eggs, creamy peanut butter      Fats and condiments: Margarine, butter, oils, mayonnaise, sour cream, salad dressing, plain gravy; spices, cooked herbs; sugar, clear jelly, honey, syrup      Snacks, sweets and drinks: Pretzels, hard candy; plain cakes and cookies (no nuts or seeds); gelatin, plain pudding, sherbet, Popsicles; coffee, tea, carbonated ( fizzy ) drinks     Starches: Breads or rolls that contain nuts, seeds or fruit; whole wheat or whole grain breads that contain more than 2 grams of fiber per serving; cornbread; corn or whole wheat tortillas; potatoes with skin; brown rice, wild rice, quinoa, kasha (buckwheat), and oatmeal      Vegetables: Any raw or steamed vegetables; vegetables with seeds; corn in any form      Fruits and fruit juices: Prunes, prune juice, raisins and other dried fruits, berries and other fruits with seeds, canned pineapple juices with pulp such as orange, grapefruit, pineapple or tomato juice     Milk products: Any yogurt with nuts, seeds or berries      Proteins: Tough,  fibrous meats with gristle; cooked dried beans, peas or lentils; crunchy peanut butter     Fats and condiments: Pickles, olives, relish, horseradish; jam, marmalade, preserves      Snacks, sweets and drinks: Popcorn, nuts, seeds, granola, coconut, candies made with nuts or seeds; all desserts that contain nuts, seeds, raisins and other dried fruits, coconut, whole grains or bran.       2 days before procedure       You may have a light, low fiber breakfast and lunch. Begin a clear liquid diet AFTER 1 PM. Do not eat solid foods. (See examples below).    Drink at least eight to ten 8-ounce glasses of water throughout the day  ? ? ? ? ? ? ? ?    Fill one container of Golytely with a full gallon of warm water. Cover and shake until well mixed. Chill in refrigerator until it is time to drink solution.     CLEAR LIQUID DIET:  You can have: Do not have:    Water, tea, coffee (no milk or cream)   Soda pop, Gatorade (not red or purple)   Coconut water   Jell-O, Popsicles (no milk or fruit pieces - not red or purple)   Fat-free soup broth or bouillon   Plain hard candy, such as clear life savers (not red or purple)   Clear juices and fruit-flavored drinks, such as apple juice, white grape juice, Hi-C, and Mina-Aid (not red or purple)    Milk or milk products such as ice cream, malts or shakes, or coffee creamer   Red or purple drinks of any kind such as cranberry juice, grape juice, or Mina-Aid. Avoid red or purple Jell-O, Popsicles, sorbet, sherbet and candy.   Juices with pulp such as orange, grapefruit, pineapple, or tomato juice   Cream soups of any kind   Alcohol and beer   Protein drinks or protein powder     Step 1     At 4 PM, take 2 Dulcolax (Bisacodyl) tablets.  At 5 PM, start drinking one 8-ounce glass of Golytely mixture every 15 minutes until the container is HALF empty. Drink each glass quickly. Store the rest in the refrigerator.   Continue to drink clear liquids.      Reminders While Drinking Laxatives:      After you start drinking the solution, stay near a toilet. You may have watery stools (diarrhea), mild cramping, bloating, and nausea. You may want to use Vaseline on the skin around your anus after each bowel movement or use wet wipes to prevent irritation. Bowel movements will be liquid and dark in color at first and then should turn clear yellow in color. Drinking the prepared solution may make you cold, wearing warm clothing can be helpful.    Some find it helpful to:  For added flavor, include a crystal light lemonade packet in each glass of Golytely, rather than mixing it into the entire prepared mixture.  In between each glass, try sucking on a lemon or a piece of hard candy to help diminish the flavor of the preparation.  Drinking from a straw can help minimize the taste of the prepared mixture.    If you have nausea or vomiting during drinking the solution, rinse your mouth with water and take a 15-30 minute break and then continue drinking solution.         1 day before procedure       Continue on a clear liquid diet. Do not eat solid foods.    Drink at least eight to ten 8-ounce glasses of water throughout the day  ? ? ? ? ? ? ? ?    Step 2     At 4 PM, take 2 Dulcolax (Bisacodyl) tablets.  At 5 PM, start drinking the 2nd half of Golytely mixture. Drink one 8-ounce glass of Golytely mixture every 15 minutes until the container is empty.  Drink each glass quickly.   Continue to drink clear liquids.    Before you go to bed mix the second container of Golytely and place in the refrigerator for the morning.     Day of procedure     Step 3     6 hours before your arrival time, start drinking one 8-ounce glass of Golytely mixture every 15 minutes until the container is HALF empty. You will not drink the entire container. The remaining solution can be discarded.     2 hours before your arrival time stop drinking all liquids, including water.   Do not smoke or swallow anything, including water or gum for at  least 2 hours before your arrival time. This is a safety issue. Your procedure could be cancelled if you do not follow directions.  No chewing tobacco 6 hours prior to procedure arrival time.     You may take your necessary morning medications with sips of water (4 ounces).   Do not take diabetes medicine by mouth until after your exam.  If you have asthma, bring your inhalers.  Please perform your nebulizer treatments and airway clearance therapy in the morning prior to the procedure (if applicable).    Arrive with a responsible adult who can drive you home and stay with you for a minimum of 24 hours. The medications used during the procedure will make you sleepy, so you won't be able to drive yourself home.   You cannot use public transportation, ride-share services, or non-medical taxi services without a responsible caregiver. Medical transport services are okay, but a caregiver must be there to receive you at your destination.  Please check in with your  when you arrive. Drivers should stay on campus.    Expect to be at the procedure center for about 1.5-2.5 hours.    Do not wear jewelry (i.e. earrings, rings, necklaces, watches, etc.). Leave your purse, billfold, credit cards, and other valuables at home.      Bring insurance card and ID.         Answers to Commonly Asked Questions     How soon can I eat after the procedure?  You may resume your normal diet when you feel ready, unless advised otherwise by the doctor performing your procedure. We recommend starting with a light meal.   Do not drink alcohol for 24 hours after your procedure.  You may resume normal activities (work, exercise, etc.) after 24 hours.    How will I feel after the procedure?  It is normal to feel bloated and gassy after your procedure. Walking will help move the air through your colon. You can take non-aspirin pain relievers that contain acetaminophen (Tylenol).  If you are having sedation, we require a responsible adult to take  you home for your safety. The sedation medicines used to relax you during the procedure can impair your judgement and reaction time and make you forgetful and possibly a little unsteady.  Do not drive, make any important decisions, or sign any legal documents for 24 hours after your procedure.    When will I get my test results?  You should have your procedure results and any lab results (if applicable) by letter, MyChart message, or phone call within 2 weeks. If you have any questions, please call the doctor that referred you for the procedure.    How do I know if my colon is cleaned out?   After completing the bowel prep, your bowel movements should be all liquid and yellow. Your bowel movements will look similar to urine in the toilet. If there are pieces of stool (poop) in the toilet, or if you can't see to the bottom of the toilet, please call our office for advice. Call [phone] and ask to speak with a nurse.    Why is the Golytely bowel prep taken in several steps?   The stool is flushed out by a large wave of fluid going through the colon. Just sipping a large volume of the solution will not achieve the desired result. Studies have shown that two smaller waves (or more in some cases) are better than one large one.      What if I need to cancel or reschedule my procedure?  Contact our endoscopy scheduling team at [phone], option 2. Monday through Friday, 7:00am-5:00pm.

## 2024-08-15 ENCOUNTER — TELEPHONE (OUTPATIENT)
Dept: GASTROENTEROLOGY | Facility: CLINIC | Age: 84
End: 2024-08-15
Payer: COMMERCIAL

## 2024-08-15 NOTE — TELEPHONE ENCOUNTER
If she calls back does need to be with  and zachary as that is her original ordering provider from previous orders    Caller: writer to patient     Reason for Reschedule/Cancellation   (please be detailed, any staff messages or encounters to note?): on tramadol/does need to have mac sedation per email to me from [facility]      Prior to reschedule please review:  Ordering Provider: Kate  Sedation Determined: mac  pain meds  Does patient have any ASC Exclusions, please identify?: n      Notes on Cancelled Procedure:  Procedure: Lower Endoscopy [Colonoscopy]   Date: 8/22  Location: [facility]; [address] [place] [address]  Surgeon: Brad      Rescheduled: No, left her a message to call back and reschedule/case in depot       Did you cancel or rescheduled an EUS procedure? No.

## 2024-08-19 NOTE — TELEPHONE ENCOUNTER
Pt called back stating Dr. Gallardo never told her it had to be with a specific provider and that her PCP was the one that placed the order. Pt wants to be seen as soon as possible as she will be going to [place] for the winter. Pt does not want to wait for Dr. Gallardo's schedule and is requesting to see a different provider. Initially escalated to [facility], will speak with her on next steps.

## 2024-08-21 NOTE — TELEPHONE ENCOUNTER
Caller: Nubiacristina Coronel      Reason for Reschedule/Cancellation   (please be detailed, any staff messages or encounters to note?): Patient returned call to reschedule      Prior to reschedule please review:  Ordering Provider: CHINYERE EASTON  Sedation Determined: MAC  Does patient have any ASC Exclusions, please identify?: NO      Rescheduled: Yes,   Procedure: Lower Endoscopy [Colonoscopy]    Date: 9/20   Location: [facility]; [address] [place] [address], 5th Floor,  [address]   Surgeon: MICHI   Sedation Level Scheduled  MAC ,  Reason for Sedation Level PER RN REVIEW   Instructions updated and sent: LETTER     Does patient need PAC or Pre -Op Rescheduled? : NO       Did you cancel or rescheduled an EUS procedure? No.

## 2024-08-21 NOTE — TELEPHONE ENCOUNTER
Patient approved to be seen by luminal Gi provider only. Will need MAC. [facility] for pt to call back and reschedule. Can offer 9/20 at [facility] with Becky if pt is willing.

## 2024-09-13 NOTE — TELEPHONE ENCOUNTER
Rescheduled Colonoscopy  Due to needing MAC sedation.    Pre visit planning completed.      Procedure details:    Patient scheduled for Colonoscopy on 9/20/24.     Arrival time: 1030. Procedure time 1130    Facility location: [facility]; [address] [place] [address], 5th Floor, [place] [address]. Check in location: 5th Floor.    Sedation type: MAC    Pre op exam needed? No.    Indication for procedure: screening colonoscopy       Chart review:     Electronic implanted devices? No    Recent diagnosis of diverticulitis within the last 6 weeks? No      Medication review:    Diabetic? Yes. Not on diabetic medication - verify patient is not currently taking any medications for her diabetes    Anticoagulants? No    Weight loss medication/injectable? No GLP-1 medication per patient's medication list.  RN will verify with pre-assessment call.      Other medication HOLDING recommendations:  N/A      Prep for procedure:     Bowel prep recommendation: Extended Golytely. Bowel prep prescription sent to    [facility] [address] [place], [place] - [address]    Due to: chronic pain medication noted in chart.     Prep instructions [facility]  Endoscopy Procedure Pre Assessment   [phone] option 2

## 2024-09-13 NOTE — TELEPHONE ENCOUNTER
Pre assessment completed for upcoming procedure.      Procedure details:    Patient scheduled for Colonoscopy on 9/20/24.     Arrival time: 1030. Procedure time 1130    Facility location: [facility]; [address] [place] [address], 5th Floor, [place] [address]. Check in location: 5th Floor.    Sedation type: MAC    Pre op exam needed? No.    Indication for procedure: screening colonoscopy     Designated  policy reviewed. Instructed to have someone stay 24 hours post procedure.       Chart review:     Electronic implanted devices? No    Recent diagnosis of diverticulitis within the last 6 weeks?  No      Medication review:    Diabetic? Yes. Not on diabetic medication    Anticoagulants? No    Weight loss medication/injectable? No.    Other medication HOLDING recommendations:  N/A      Prep for procedure:     Bowel prep recommendation: Extended Golytely. Bowel prep prescription sent to    [facility] [address] [place] [address] [address]    Due to: chronic pain medication noted in chart.     Prep instructions sent via [employer] per patient she does use [employer] but had requested instructions be sent to her. Prep instructions mailed out to patient, RN did advise letter may not make it to patient in time. Patient will look for instructions in [employer] as well.    Reviewed procedure prep instructions.     Patient verbalized understanding and had no questions or concerns at this time.        Nay London RN  Endoscopy Procedure Pre Assessment   [phone] option 2

## 2024-09-17 NOTE — TELEPHONE ENCOUNTER
Incoming call received from Pt inquiring about why she was sent extended Golytely prep.  Pt was advised that it was because she takes Ultram/tramadol everyday and that triggered according to our guidelines to send the extended prep.  Pt stated that she has been taking tramadol for over 20 years and has never had any issues with cleaning out her colon. Pt was told that she has the choice if she does not want to complete the extended prep, but was advised of the guidelines and the rationale of the extended prep.  Pt requested to have the standard Golytely prep instructions sent to her [facility].  Writer sent new instructions.     No further questions or concerns at this time.      Tena Teixeira RN

## 2024-09-19 ENCOUNTER — ANESTHESIA EVENT (OUTPATIENT)
Dept: SURGERY | Facility: AMBULATORY SURGERY CENTER | Age: 84
End: 2024-09-19
Payer: COMMERCIAL

## 2024-09-19 NOTE — TELEPHONE ENCOUNTER
The Pt called in clarifying the bowel prep directions. I did go over the directions with her in detail.     Discussed this with her in detail. No further questions.     Lucia Aguiar, RN   Endoscopy Procedure Pre Assessment RN

## 2024-09-19 NOTE — ANESTHESIA PREPROCEDURE EVALUATION
Anesthesia Pre-Procedure Evaluation    Patient: Nubia Coronel   MRN: [id number] : 1940        Procedure : Procedure(s):  Colonoscopy          Past Medical History:   Diagnosis Date    Fibromyalgia     Myofacial muscle pain     having myofacial tx     Osteoarthrosis, unspecified whether generalized or localized, other specified sites     Cervical DJD - Dr. Dalia Huynh    Prediabetes 2019      Past Surgical History:   Procedure Laterality Date    APPENDECTOMY      CHOLECYSTECTOMY      COLONOSCOPY  2012    Procedure:COLONOSCOPY; COLONOSCOPY; Surgeon:ELIZABETH SINGH; Location: GI    COLONOSCOPY N/A 2023    Procedure: Colonoscopy WITH POLYPECTOMY AND CLIPS;  Surgeon: Lisa Gallardo MD;  [place] [facility] [place]    GYN SURGERY      hysterectomy    HAND SURGERY Right 10/10/2022    Right thumb CMC joint arthroplasty using a suspension technique (two #2 FiberWire were used for suspension) along with a trapeziectomy, Dr. Jaxon Saeed, [facility]    HEAD & NECK SURGERY      sinus surgery    ORTHOPEDIC SURGERY      bunions    [facility] NONSPECIFIC PROCEDURE  1998    Negative MRI of the left shoulder    [facility] NONSPECIFIC PROCEDURE  1999    Normal bone density scan    ZZ NONSPECIFIC PROCEDURE      Normal pap and mammos    Z NONSPECIFIC PROCEDURE  2001    Negative brain MRI    Z NONSPECIFIC PROCEDURE      Negative liver biopy    ZZ NONSPECIFIC PROCEDURE      S/P cholecystectomy    ZZC NONSPECIFIC PROCEDURE      S/P hysterectomy    ZZC NONSPECIFIC PROCEDURE      S/P appendectomy    ZZC NONSPECIFIC PROCEDURE      Negative breast biopsy      Allergies   Allergen Reactions    Cortisone Hives     Prick and intradermal tests with various steroids negatives, including Hydrocortisone succinate, methylprednisolone succinate, Triamcinolone, Dexamethasone. If these steroids are necessary, they can be given. Premedicate patient with antihistamines and keep an eye on patient for 1h     "  Social History     Tobacco Use    Smoking status: Former     Current packs/day: 0.00     Types: Cigarettes     Quit date: 10/1/1981     Years since quittin.9     Passive exposure: Never    Smokeless tobacco: Never   Substance Use Topics    Alcohol use: Not Currently     Comment: social      Wt Readings from Last 1 Encounters:   05/10/24 67.1 kg (148 lb)        Anesthesia Evaluation   Pt has had prior anesthetic.     No history of anesthetic complications       ROS/MED HX  ENT/Pulmonary:       Neurologic:       Cardiovascular:       METS/Exercise Tolerance:     Hematologic:       Musculoskeletal:       GI/Hepatic:     (+) GERD,                   Renal/Genitourinary:       Endo:     (+)  type II DM,                    Psychiatric/Substance Use:       Infectious Disease:       Malignancy:       Other:      (+)  , H/O Chronic Pain (Fibromyalgia),         Physical Exam    Airway        Mallampati: II   TM distance: > 3 FB   Neck ROM: full   Mouth opening: > 3 cm    Respiratory Devices and Support         Dental       (+) Multiple crowns, permanant bridges      Cardiovascular   cardiovascular exam normal          Pulmonary   pulmonary exam normal                OUTSIDE LABS:  CBC:   Lab Results   Component Value Date    WBC 7.8 05/10/2024    WBC 9.2 08/15/2023    HGB 15.8 (H) 05/10/2024    HGB 15.3 08/15/2023    HCT 47.3 (H) 05/10/2024    HCT 45.6 08/15/2023     05/10/2024     08/15/2023     BMP:   Lab Results   Component Value Date     05/10/2024     2023    POTASSIUM 3.7 05/10/2024    POTASSIUM 3.2 (L) 2023    CHLORIDE 100 05/10/2024    CHLORIDE 98 2023    CO2 31 (H) 05/10/2024    CO2 28 2023    BUN 17.9 05/10/2024    BUN 15.4 2023    CR 0.78 05/10/2024    CR 0.80 2023     (H) 05/10/2024     (H) 2023     COAGS: No results found for: \"PTT\", \"INR\", \"FIBR\"  POC: No results found for: \"BGM\", \"HCG\", \"HCGS\"  HEPATIC:   Lab Results "   Component Value Date    ALBUMIN 4.5 05/10/2024    PROTTOTAL 7.3 05/10/2024    ALT 59 (H) 05/10/2024    AST 49 (H) 05/10/2024    ALKPHOS 94 05/10/2024    BILITOTAL 0.5 05/10/2024     OTHER:   Lab Results   Component Value Date    A1C 6.2 (H) 05/10/2024    DARRIAN 10.3 (H) 05/10/2024    LIPASE 76 12/10/2009    TSH 0.82 05/10/2024    T4 1.11 10/01/2003    CRP 7.8 05/10/2022    SED 17 05/10/2022       Anesthesia Plan    ASA Status:  2    NPO Status:  NPO Appropriate    Anesthesia Type: MAC.     - Reason for MAC: straight local not clinically adequate   Induction: Propofol.   Maintenance: TIVA.        Consents    Anesthesia Plan(s) and associated risks, benefits, and realistic alternatives discussed. Questions answered and patient/representative(s) expressed understanding.     - Discussed: Risks, Benefits and Alternatives for BOTH SEDATION and the PROCEDURE were discussed     - Discussed with:             Postoperative Care    Pain management: IV analgesics, Oral pain medications.   PONV prophylaxis: Ondansetron (or other 5HT-3), Dexamethasone or Solumedrol     Comments:               Dakotah Locke MD    I have reviewed the pertinent notes and labs in the chart from the past 30 days and (re)examined the patient.  Any updates or changes from those notes are reflected in this note.

## 2024-09-20 ENCOUNTER — ANESTHESIA (OUTPATIENT)
Dept: SURGERY | Facility: AMBULATORY SURGERY CENTER | Age: 84
End: 2024-09-20
Payer: COMMERCIAL

## 2024-09-20 ENCOUNTER — HOSPITAL ENCOUNTER (OUTPATIENT)
Facility: AMBULATORY SURGERY CENTER | Age: 84
Discharge: HOME | End: 2024-09-20
Attending: INTERNAL MEDICINE
Payer: COMMERCIAL

## 2024-09-20 VITALS
HEIGHT: 63 IN | TEMPERATURE: 97.8 F | RESPIRATION RATE: 16 BRPM | SYSTOLIC BLOOD PRESSURE: 125 MMHG | DIASTOLIC BLOOD PRESSURE: 77 MMHG | HEART RATE: 75 BPM | BODY MASS INDEX: 26.22 KG/M2 | OXYGEN SATURATION: 98 % | WEIGHT: 148 LBS

## 2024-09-20 VITALS — HEART RATE: 80 BPM

## 2024-09-20 LAB — COLONOSCOPY: NORMAL

## 2024-09-20 PROCEDURE — 99100 ANES PT EXTEME AGE<1 YR&>70: CPT | Performed by: ANESTHESIOLOGY

## 2024-09-20 PROCEDURE — 88305 TISSUE EXAM BY PATHOLOGIST: CPT | Mod: TC | Performed by: INTERNAL MEDICINE

## 2024-09-20 PROCEDURE — 45385 COLONOSCOPY W/LESION REMOVAL: CPT | Performed by: ANESTHESIOLOGY

## 2024-09-20 PROCEDURE — 45385 COLONOSCOPY W/LESION REMOVAL: CPT | Mod: PT | Performed by: INTERNAL MEDICINE

## 2024-09-20 PROCEDURE — 45385 COLONOSCOPY W/LESION REMOVAL: CPT | Performed by: NURSE ANESTHETIST, CERTIFIED REGISTERED

## 2024-09-20 PROCEDURE — 99100 ANES PT EXTEME AGE<1 YR&>70: CPT | Performed by: NURSE ANESTHETIST, CERTIFIED REGISTERED

## 2024-09-20 PROCEDURE — 88305 TISSUE EXAM BY PATHOLOGIST: CPT | Mod: 26 | Performed by: PATHOLOGY

## 2024-09-20 RX ORDER — ONDANSETRON 4 MG/1
4 TABLET, ORALLY DISINTEGRATING ORAL EVERY 6 HOURS PRN
Status: DISCONTINUED | OUTPATIENT
Start: 2024-09-20 | End: 2024-09-21 | Stop reason: HOSPADM

## 2024-09-20 RX ORDER — PROPOFOL 10 MG/ML
INJECTION, EMULSION INTRAVENOUS PRN
Status: DISCONTINUED | OUTPATIENT
Start: 2024-09-20 | End: 2024-09-20

## 2024-09-20 RX ORDER — SODIUM CHLORIDE, SODIUM LACTATE, POTASSIUM CHLORIDE, CALCIUM CHLORIDE 600; 310; 30; 20 MG/100ML; MG/100ML; MG/100ML; MG/100ML
INJECTION, SOLUTION INTRAVENOUS CONTINUOUS PRN
Status: DISCONTINUED | OUTPATIENT
Start: 2024-09-20 | End: 2024-09-20

## 2024-09-20 RX ORDER — NALOXONE HYDROCHLORIDE 0.4 MG/ML
0.2 INJECTION, SOLUTION INTRAMUSCULAR; INTRAVENOUS; SUBCUTANEOUS
Status: DISCONTINUED | OUTPATIENT
Start: 2024-09-20 | End: 2024-09-21 | Stop reason: HOSPADM

## 2024-09-20 RX ORDER — PROPOFOL 10 MG/ML
INJECTION, EMULSION INTRAVENOUS CONTINUOUS PRN
Status: DISCONTINUED | OUTPATIENT
Start: 2024-09-20 | End: 2024-09-20

## 2024-09-20 RX ORDER — NALOXONE HYDROCHLORIDE 0.4 MG/ML
0.4 INJECTION, SOLUTION INTRAMUSCULAR; INTRAVENOUS; SUBCUTANEOUS
Status: DISCONTINUED | OUTPATIENT
Start: 2024-09-20 | End: 2024-09-21 | Stop reason: HOSPADM

## 2024-09-20 RX ORDER — FLUMAZENIL 0.1 MG/ML
0.2 INJECTION, SOLUTION INTRAVENOUS
Status: DISCONTINUED | OUTPATIENT
Start: 2024-09-20 | End: 2024-09-21 | Stop reason: HOSPADM

## 2024-09-20 RX ORDER — ONDANSETRON 2 MG/ML
4 INJECTION INTRAMUSCULAR; INTRAVENOUS EVERY 6 HOURS PRN
Status: DISCONTINUED | OUTPATIENT
Start: 2024-09-20 | End: 2024-09-21 | Stop reason: HOSPADM

## 2024-09-20 RX ORDER — ONDANSETRON 2 MG/ML
4 INJECTION INTRAMUSCULAR; INTRAVENOUS
Status: DISCONTINUED | OUTPATIENT
Start: 2024-09-20 | End: 2024-09-20 | Stop reason: HOSPADM

## 2024-09-20 RX ORDER — LIDOCAINE 40 MG/G
CREAM TOPICAL
Status: DISCONTINUED | OUTPATIENT
Start: 2024-09-20 | End: 2024-09-20 | Stop reason: HOSPADM

## 2024-09-20 RX ORDER — PROCHLORPERAZINE MALEATE 5 MG
5 TABLET ORAL EVERY 6 HOURS PRN
Status: DISCONTINUED | OUTPATIENT
Start: 2024-09-20 | End: 2024-09-21 | Stop reason: HOSPADM

## 2024-09-20 RX ORDER — SODIUM CHLORIDE, SODIUM LACTATE, POTASSIUM CHLORIDE, CALCIUM CHLORIDE 600; 310; 30; 20 MG/100ML; MG/100ML; MG/100ML; MG/100ML
INJECTION, SOLUTION INTRAVENOUS CONTINUOUS
Status: DISCONTINUED | OUTPATIENT
Start: 2024-09-20 | End: 2024-09-20 | Stop reason: HOSPADM

## 2024-09-20 RX ORDER — LIDOCAINE HYDROCHLORIDE 20 MG/ML
INJECTION, SOLUTION INFILTRATION; PERINEURAL PRN
Status: DISCONTINUED | OUTPATIENT
Start: 2024-09-20 | End: 2024-09-20

## 2024-09-20 RX ADMIN — PROPOFOL 200 MCG/KG/MIN: 10 INJECTION, EMULSION INTRAVENOUS at 11:32

## 2024-09-20 RX ADMIN — SODIUM CHLORIDE, SODIUM LACTATE, POTASSIUM CHLORIDE, CALCIUM CHLORIDE: 600; 310; 30; 20 INJECTION, SOLUTION INTRAVENOUS at 10:46

## 2024-09-20 RX ADMIN — LIDOCAINE HYDROCHLORIDE 60 MG: 20 INJECTION, SOLUTION INFILTRATION; PERINEURAL at 11:32

## 2024-09-20 RX ADMIN — PROPOFOL 60 MG: 10 INJECTION, EMULSION INTRAVENOUS at 11:32

## 2024-09-20 RX ADMIN — SODIUM CHLORIDE, SODIUM LACTATE, POTASSIUM CHLORIDE, CALCIUM CHLORIDE: 600; 310; 30; 20 INJECTION, SOLUTION INTRAVENOUS at 11:16

## 2024-09-20 NOTE — H&P
Nubia Coronel  [id number]  female  83 year old      Reason for procedure/surgery: History of an advanced colon polyp    Patient Active Problem List   Diagnosis    Headache    Chronic rhinitis    Myalgia and myositis    Renal calculus    CARDIOVASCULAR SCREENING; LDL GOAL LESS THAN 160    GERD (gastroesophageal reflux disease)    Bilateral lower extremity edema    Kidney stone    Fibromyalgia    Primary insomnia    Prediabetes    Intractable migraine without aura and without status migrainosus    Degeneration of cervical intervertebral disc    Encounter for medication refill    Sleep disturbance    Diabetes mellitus, type 2 (H)    Colonic thickening       Past Surgical History:    Past Surgical History:   Procedure Laterality Date    APPENDECTOMY      CHOLECYSTECTOMY      COLONOSCOPY  02/16/2012    Procedure:COLONOSCOPY; COLONOSCOPY; Surgeon:ELIZABETH SINGH; Location: GI    COLONOSCOPY N/A 9/11/2023    Procedure: Colonoscopy WITH POLYPECTOMY AND CLIPS;  Surgeon: Lisa Gallardo MD;  Location: [facility] OR    GYN SURGERY      hysterectomy    HAND SURGERY Right 10/10/2022    Right thumb CMC joint arthroplasty using a suspension technique (two #2 FiberWire were used for suspension) along with a trapeziectomy, Dr. Jaxon Saeed, [facility]    HEAD & NECK SURGERY      sinus surgery    ORTHOPEDIC SURGERY      bunions    [facility] NONSPECIFIC PROCEDURE  12/01/1998    Negative MRI of the left shoulder    Z NONSPECIFIC PROCEDURE  01/01/1999    Normal bone density scan    ZZ NONSPECIFIC PROCEDURE      Normal pap and mammos    ZZC NONSPECIFIC PROCEDURE  09/01/2001    Negative brain MRI    ZZ NONSPECIFIC PROCEDURE      Negative liver biopy    ZZ NONSPECIFIC PROCEDURE      S/P cholecystectomy    ZZC NONSPECIFIC PROCEDURE      S/P hysterectomy    ZZC NONSPECIFIC PROCEDURE      S/P appendectomy    ZZC NONSPECIFIC PROCEDURE      Negative breast biopsy       Past Medical History:   Past Medical History:   Diagnosis Date     Fibromyalgia     Myofacial muscle pain     having myofacial tx     Osteoarthrosis, unspecified whether generalized or localized, other specified sites     Cervical FRANCISCO - Dr. Dalia Huynh    Prediabetes 2019       Social History:   Social History     Tobacco Use    Smoking status: Former     Current packs/day: 0.00     Types: Cigarettes     Quit date: 10/1/1981     Years since quittin.0     Passive exposure: Never    Smokeless tobacco: Never   Substance Use Topics    Alcohol use: Not Currently     Comment: social       Family History:   Family History   Problem Relation Age of Onset    Family History Negative Mother          age 94    C.A.D. Father           73 yo MI    Blood Disease Sister         Factor 2 - blood clots,allergies    Respiratory Brother          68 yo Emphysema (smoker)    Diabetes Brother     C.A.D. Brother         pacemaker    Family History Negative Son     Family History Negative Son     Family History Negative Son        Allergies:   Allergies   Allergen Reactions    Cortisone Hives     Prick and intradermal tests with various steroids negatives, including Hydrocortisone succinate, methylprednisolone succinate, Triamcinolone, Dexamethasone. If these steroids are necessary, they can be given. Premedicate patient with antihistamines and keep an eye on patient for 1h       Active Medications:   Current Outpatient Medications   Medication Sig Dispense Refill    Alpha-Lipoic Acid 300 MG TABS Take by mouth 2 times daily      amoxicillin-clavulanate (AUGMENTIN) 875-125 MG tablet Take 1 tablet by mouth 2 times daily 20 tablet 0    bisacodyl (DULCOLAX) 5 MG EC tablet Take 2 tablets at 3 pm the day before your procedure. If your procedure is before 11 am, take 2 additional tablets at 11 pm. If your procedure is after 11 am, take 2 additional tablets at 6 am. For additional instructions refer to your colonoscopy prep instructions. 4 tablet 0    butalbital-aspirin-caffeine  (FIORINAL) -40 MG capsule Take 1 capsule by mouth every 4 hours as needed for headaches 10 capsule 0    Collagen-Boron-Hyaluronic Acid ([employer] TRIPLE ACTION PO) Take 1 capsule by mouth daily      esomeprazole (NEXIUM) 20 MG DR capsule Take 20 mg by mouth every morning (before breakfast) Take 30-60 minutes before eating.      fexofenadine (ALLEGRA) 180 MG tablet Take 1 tablet (180 mg) by mouth every evening 50 tablet 2    hydrochlorothiazide (HYDRODIURIL) 50 MG tablet TAKE 1 TABLET EVERY DAY 90 tablet 3    Levocarnitine 500 MG CAPS TID      Multiple Vitamins-Minerals (QC WOMENS DAILY MULTIVITAMIN PO) Take by mouth 2 times daily      NONFORMULARY Apply 1 spray topically 2 times daily Spray on lids twice a day      polyethylene glycol (GOLYTELY) 236 g suspension Two days before procedure at 5PM fill first container with water. Mix and drink an 8 oz glass every 10-15 minutes until HALF of the container is gone. Place the remainder in the refrigerator. One day before procedure at 5PM drink second half of bowel prep. Drink an 8 oz glass every 10-15 minutes until it is gone. Day of procedure 6 hours before arrival time fill the 2nd container with water. Mix and drink an 8 oz glass every 10-15 minutes until HALF of the container is gone. Discard the remaining solution. 8000 mL 0    polyethylene glycol (GOLYTELY) 236 g suspension The night before the exam at 6 pm drink an 8-ounce glass every 15 minutes until the jug is half empty. If you arrive before 11 AM: Drink the other half of the Golytely jug at 11 PM night before procedure. If you arrive after 11 AM: Drink the other half of the Golytely jug at 6 AM day of procedure. For additional instructions refer to your colonoscopy prep instructions. 4000 mL 0    Probiotic Product (PROBIOTIC DAILY) CAPS Take by mouth.      topiramate (TOPAMAX) 25 MG tablet TAKE 1 TABLET BY MOUTH IN THE  MORNING AND 3 TABLETS IN THE  EVENING 400 tablet 3    traMADol (ULTRAM) 50 MG  "tablet Take one tablet by mouth each AM. Take one tablet each Tuesday, Thursday and Saturday evening. For Non-Acute Pain. 43 tablet 5    TUMS 500 OR 2 tabs q pm      UNABLE TO FIND Take 500 mg by mouth daily MEDICATION NAME: L Carnitine      UNABLE TO FIND Take 1 capsule by mouth At Bedtime MEDICATION NAME: Tumeric Curcuman      urea (GORMEL) 20 % external cream Apply topically daily 85 g 5    VITAMIN D 1000 UNIT OR TABS 1 daily      bisacodyl (DULCOLAX) 5 MG EC tablet Two days prior to exam take two (2) tablets at 4pm. One day prior to exam take two (2) tablets at 4pm 4 tablet 0    ciclopirox (LOPROX) 0.77 % cream Apply topically daily 30 g 6    cyanocobalamin (VITAMIN B-12) 1000 MCG tablet Take 1 tablet (1,000 mcg) by mouth daily 90 tablet 0    doxycycline hyclate (VIBRA-TABS) 100 MG tablet Take 1 tablet (100 mg) by mouth 2 times daily (Patient not taking: Reported on 5/10/2024) 20 tablet 0    pravastatin (PRAVACHOL) 10 MG tablet Take 1 tablet (10 mg) by mouth daily 90 tablet 3    traZODone (DESYREL) 50 MG tablet Take 1 tablet (50 mg) by mouth at bedtime 90 tablet 3       Systemic Review:   CONSTITUTIONAL: NEGATIVE for fever, chills, change in weight  ENT/MOUTH: NEGATIVE for ear, mouth and throat problems  RESP: NEGATIVE for significant cough or SOB  CV: NEGATIVE for chest pain, palpitations or peripheral edema    Physical Examination:   Vital Signs: /75 (BP Location: Right arm)   Temp 97.5  F (36.4  C) (Temporal)   Resp 18   Ht 1.6 m (5' 3\")   Wt 67.1 kg (148 lb)   LMP  (LMP Unknown)   SpO2 98%   BMI 26.22 kg/m    GENERAL: healthy, alert and no distress  NECK: no adenopathy, no asymmetry, masses, or scars  RESP: lungs clear to auscultation - no rales, rhonchi or wheezes  CV: regular rate and rhythm, normal S1 S2, no S3 or S4, no murmur, click or rub, no peripheral edema and peripheral pulses strong  ABDOMEN: soft, nontender, no hepatosplenomegaly, no masses and bowel sounds normal  MS: no gross " musculoskeletal defects noted, no edema    Plan: Appropriate to proceed as scheduled.      Julio Pena MD  9/20/2024    PCP:  Eric Love

## 2024-09-20 NOTE — ANESTHESIA POSTPROCEDURE EVALUATION
Patient: Nubia Coronel    Procedure: Procedure(s):  COLONOSCOPY, WITH POLYPECTOMY       Anesthesia Type:  MAC    Note:  Disposition: Outpatient   Postop Pain Control: Uneventful            Sign Out: Well controlled pain   PONV: No   Neuro/Psych: Uneventful            Sign Out: Acceptable/Baseline neuro status   Airway/Respiratory: Uneventful            Sign Out: Acceptable/Baseline resp. status   CV/Hemodynamics: Uneventful            Sign Out: Acceptable CV status; No obvious hypovolemia; No obvious fluid overload   Other NRE: NONE   DID A NON-ROUTINE EVENT OCCUR?            Last vitals:  Vitals Value Taken Time   /77 09/20/24 1215   Temp 36.6  C (97.8  F) 09/20/24 1215   Pulse 75 09/20/24 1215   Resp 16 09/20/24 1215   SpO2 98 % 09/20/24 1215       Electronically Signed By: Babak Orr MD  September 20, 2024  1:31 PM

## 2024-09-20 NOTE — ANESTHESIA CARE TRANSFER NOTE
Patient: Nubia Coronel    Procedure: Procedure(s):  COLONOSCOPY, WITH POLYPECTOMY       Diagnosis: Screen for colon cancer [Z12.11]  Diagnosis Additional Information: No value filed.    Anesthesia Type:   MAC     Note:    Oropharynx: oropharynx clear of all foreign objects and spontaneously breathing  Level of Consciousness: awake  Oxygen Supplementation: room air    Independent Airway: airway patency satisfactory and stable  Dentition: dentition unchanged  Vital Signs Stable: post-procedure vital signs reviewed and stable  Report to RN Given: handoff report given  Patient transferred to: Phase II    Handoff Report: Identifed the Patient, Identified the Reponsible Provider, Reviewed the pertinent medical history, Discussed the surgical course, Reviewed Intra-OP anesthesia mangement and issues during anesthesia, Set expectations for post-procedure period and Allowed opportunity for questions and acknowledgement of understanding      Vitals:  Vitals Value Taken Time   BP     Temp     Pulse     Resp     SpO2         Electronically Signed By: MICHI Nobles CRNA  September 20, 2024  12:01 PM

## 2024-09-23 LAB
PATH REPORT.COMMENTS IMP SPEC: NORMAL
PATH REPORT.COMMENTS IMP SPEC: NORMAL
PATH REPORT.FINAL DX SPEC: NORMAL
PATH REPORT.GROSS SPEC: NORMAL
PATH REPORT.MICROSCOPIC SPEC OTHER STN: NORMAL
PATH REPORT.RELEVANT HX SPEC: NORMAL
PHOTO IMAGE: NORMAL

## 2024-10-01 ENCOUNTER — TRANSFERRED RECORDS (OUTPATIENT)
Dept: HEALTH INFORMATION MANAGEMENT | Facility: CLINIC | Age: 84
End: 2024-10-01
Payer: COMMERCIAL

## 2024-10-13 ENCOUNTER — HEALTH MAINTENANCE LETTER (OUTPATIENT)
Age: 84
End: 2024-10-13

## 2024-11-04 ENCOUNTER — DOCUMENTATION ONLY (OUTPATIENT)
Dept: LAB | Facility: CLINIC | Age: 84
End: 2024-11-04

## 2024-11-04 DIAGNOSIS — E11.9 DIABETES MELLITUS, TYPE 2 (H): Primary | ICD-10-CM

## 2024-11-13 DIAGNOSIS — M79.7 FIBROMYALGIA: ICD-10-CM

## 2024-11-13 NOTE — TELEPHONE ENCOUNTER
Patient calls to follow up on refill request, writer advised Dr. Love will review. Patient reports that she only has a few days left. Encounter already routed to provider.     Summer RN 4:51 PM November 13, 2024   [facility]

## 2024-11-15 RX ORDER — TRAMADOL HYDROCHLORIDE 50 MG/1
TABLET ORAL
Qty: 43 TABLET | Refills: 5 | Status: SHIPPED | OUTPATIENT
Start: 2024-11-15

## 2024-11-15 RX ORDER — TRAMADOL HYDROCHLORIDE 50 MG/1
TABLET ORAL
Qty: 42 TABLET | Refills: 0 | OUTPATIENT
Start: 2024-11-15

## 2024-11-15 NOTE — TELEPHONE ENCOUNTER
Patient is calling back to inquire about this refill.    Please address this as patient only has 2 tablets left

## 2024-11-18 ENCOUNTER — LAB (OUTPATIENT)
Dept: LAB | Facility: CLINIC | Age: 84
End: 2024-11-18
Payer: COMMERCIAL

## 2024-11-18 DIAGNOSIS — E11.9 DIABETES MELLITUS, TYPE 2 (H): ICD-10-CM

## 2024-11-18 LAB
EST. AVERAGE GLUCOSE BLD GHB EST-MCNC: 160 MG/DL
HBA1C MFR BLD: 7.2 % (ref 0–5.6)

## 2024-11-18 PROCEDURE — 83036 HEMOGLOBIN GLYCOSYLATED A1C: CPT

## 2024-11-18 PROCEDURE — 36415 COLL VENOUS BLD VENIPUNCTURE: CPT

## 2024-11-20 ENCOUNTER — OFFICE VISIT (OUTPATIENT)
Dept: INTERNAL MEDICINE | Facility: CLINIC | Age: 84
End: 2024-11-20
Payer: COMMERCIAL

## 2024-11-20 VITALS
SYSTOLIC BLOOD PRESSURE: 134 MMHG | OXYGEN SATURATION: 94 % | HEART RATE: 107 BPM | TEMPERATURE: 98.1 F | RESPIRATION RATE: 16 BRPM | WEIGHT: 152 LBS | BODY MASS INDEX: 26.93 KG/M2 | HEIGHT: 63 IN | DIASTOLIC BLOOD PRESSURE: 77 MMHG

## 2024-11-20 DIAGNOSIS — Z79.899 CONTROLLED SUBSTANCE AGREEMENT SIGNED: ICD-10-CM

## 2024-11-20 DIAGNOSIS — E11.9 TYPE 2 DIABETES MELLITUS WITHOUT COMPLICATION, WITHOUT LONG-TERM CURRENT USE OF INSULIN (H): ICD-10-CM

## 2024-11-20 DIAGNOSIS — E78.5 HYPERLIPIDEMIA LDL GOAL <100: ICD-10-CM

## 2024-11-20 DIAGNOSIS — G43.019 INTRACTABLE MIGRAINE WITHOUT AURA AND WITHOUT STATUS MIGRAINOSUS: Primary | ICD-10-CM

## 2024-11-20 DIAGNOSIS — G89.4 PAIN SYNDROME, CHRONIC: ICD-10-CM

## 2024-11-20 PROCEDURE — 99214 OFFICE O/P EST MOD 30 MIN: CPT | Performed by: INTERNAL MEDICINE

## 2024-11-20 NOTE — PROGRESS NOTES
"  Assessment & Plan   (G43.019) Intractable migraine without aura and without status migrainosus  (primary encounter diagnosis)  Comment: Continue butalbital infrequently prn.     (G89.4) Pain syndrome, chronic  (Z79.899) Controlled substance agreement signed  Comment: Patient adhering to CSA, continue current tramadol regimen.     (E11.9) Type 2 diabetes mellitus without complication, without long-term current use of insulin (H)  Comment: A1c at target. Continue current measures.     (E78.5) Hyperlipidemia LDL goal <100  Comment: Okay to reduce pravastatin to 10 mg 2-3 times weekly.         BMI  Estimated body mass index is 26.93 kg/m  as calculated from the following:    Height as of this encounter: 1.6 m (5' 3\").    Weight as of this encounter: 68.9 kg (152 lb).         Patient Instructions   Everything looks fine!    Refills of medications should now be on file at your pharmacy.     Recent lab results look fine.     See me sometime after May 10, 2025 for next annual wellness visit.     Naveen Sanchez is a 83 year old, presenting for the following health issues:  Follow Up      11/20/2024     6:54 AM   Additional Questions   Roomed by Becky BIRD CMA     History of Present Illness       Reason for visit:  Follow up   She is taking medications regularly.       Follow up needed every six months for controlled substance refills, diabetes mellitus, hyperlipidemia.   CSA updated and signed on 5/10/2024.  Needing tramadol a bit less often as a second dose on days of the week in which she has been more actively exercising.     A1c was at target at 7.2 on 11/18/2024.     She believes that her hunger has increased since she started taking the pravastatin 10 mg tabs every day. She may try again taking this less frequently, perhaps 2-3 times per week.     Past medical, family and social histories as well as medications reviewed and updated as needed.    No dyspnea or cough. No chest discomfort, dizziness or palpitations. No " "diarrhea, abdominal pain or rectal bleeding.   No acute problems with vision or speech, lateralizing weakness or paresthesias.    ROS: as above or negative for Respiratory, CV, GI, endocrine, neuro systems.       Objective    /77 (BP Location: Left arm, Patient Position: Sitting, Cuff Size: Adult Regular)   Pulse 107   Temp 98.1  F (36.7  C) (Tympanic)   Resp 16   Ht 1.6 m (5' 3\")   Wt 68.9 kg (152 lb)   LMP  (LMP Unknown)   SpO2 94%   Breastfeeding No   BMI 26.93 kg/m    Body mass index is 26.93 kg/m .    Physical Exam   GENERAL: alert and no distress  EYES: Eyes grossly normal to inspection, PERRL and conjunctivae and sclerae normal  RESP: lungs clear to auscultation - no rales, rhonchi or wheezes  CV: regular rate and rhythm, normal S1 S2, no S3 or S4, no murmur, click or rub, no peripheral edema  NEURO: Normal strength and tone, mentation intact and speech normal  PSYCH: mentation appears normal, affect normal/bright          Signed Electronically by: Eric Love MD,           Please abstract the following data from this visit with this patient into the appropriate field in Epic:    Tests that can be patient reported without a hard copy:    Eye exam with ophthalmology on this date: 6/2024 Exam Location: [facility] right eye, [facility] in [place], [place].     Other Tests found in the patient's chart through Chart Review/Care Everywhere:  Note to Abstraction: If this section is blank, no results were found via Chart Review/Care Everywhere.      "

## 2024-11-20 NOTE — PATIENT INSTRUCTIONS
Everything looks fine!    Refills of medications should now be on file at your pharmacy.     Recent lab results look fine.     See me sometime after May 10, 2025 for next annual wellness visit.

## 2024-12-21 DIAGNOSIS — M79.7 FIBROMYALGIA: ICD-10-CM

## 2024-12-21 DIAGNOSIS — G43.019 INTRACTABLE MIGRAINE WITHOUT AURA AND WITHOUT STATUS MIGRAINOSUS: ICD-10-CM

## 2024-12-22 RX ORDER — BUTALBITAL, ASPIRIN, AND CAFFEINE 325; 50; 40 MG/1; MG/1; MG/1
CAPSULE ORAL
Qty: 10 CAPSULE | Refills: 0 | Status: SHIPPED | OUTPATIENT
Start: 2024-12-22 | End: 2025-01-06

## 2025-01-06 NOTE — TELEPHONE ENCOUNTER
Medication Question or Refill    Contacts       Contact Date/Time Type Contact Phone/Fax    12/21/2024 12:13 PM CST Interface (Incoming) [id number] - [place], [place] - [address] (Pharmacy) [phone]    01/06/2025 10:28 AM CST Phone (Incoming) Laura Coronel (Self) [phone] (M)            What medication are you calling about (include dose and sig)?: Fiorinal, Hydrodiuril Tramadol ( she understands its not ready to be filled just wants it on file in [place])    Preferred Pharmacy:     [facility] [address] [place] [address] [address] [facility]  [address] [facility] [address]  Phone: [phone] Fax: [phone]    Controlled Substance Agreement on file:   CSA -- Patient Level:     [Media Unavailable] Controlled Substance Agreement - Opioid - Scan on 5/10/2024 12:55 PM   [Media Unavailable] Controlled Substance Agreement - Opioid - Scan on 5/9/2023  5:15 PM   [Media Unavailable] Controlled Substance Agreement - Opioid - Scan on 5/11/2022  1:57 PM: OPIOD       Who prescribed the medication?: Karlazer    Do you need a refill? Yes, med was sent to [facility] in [place] but it wasn't filled in time, patient is now in [place] and needs it sent there.    When did you use the medication last? 12/2024    Patient offered an appointment? No    Do you have any questions or concerns?  Yes: patient will be in [place] for 4 months and wants to let the provider know.       Could we send this information to you in [facility] or would you prefer to receive a phone call?:   Patient would prefer a phone call   Okay to leave a detailed message?: Yes at Cell number on file:    Telephone Information:   Mobile [phone]

## 2025-01-07 RX ORDER — BUTALBITAL, ASPIRIN, AND CAFFEINE 325; 50; 40 MG/1; MG/1; MG/1
1 CAPSULE ORAL EVERY 4 HOURS PRN
Qty: 10 CAPSULE | Refills: 0 | Status: SHIPPED | OUTPATIENT
Start: 2025-01-07

## 2025-01-07 RX ORDER — TRAMADOL HYDROCHLORIDE 50 MG/1
TABLET ORAL
Qty: 43 TABLET | Refills: 5 | Status: SHIPPED | OUTPATIENT
Start: 2025-01-07

## 2025-02-22 ENCOUNTER — HEALTH MAINTENANCE LETTER (OUTPATIENT)
Age: 85
End: 2025-02-22

## 2025-05-13 ENCOUNTER — OFFICE VISIT (OUTPATIENT)
Dept: INTERNAL MEDICINE | Facility: CLINIC | Age: 85
End: 2025-05-13
Payer: COMMERCIAL

## 2025-05-13 VITALS
WEIGHT: 151 LBS | OXYGEN SATURATION: 94 % | HEIGHT: 63 IN | HEART RATE: 81 BPM | BODY MASS INDEX: 26.75 KG/M2 | RESPIRATION RATE: 16 BRPM | TEMPERATURE: 98.3 F | SYSTOLIC BLOOD PRESSURE: 124 MMHG | DIASTOLIC BLOOD PRESSURE: 62 MMHG

## 2025-05-13 DIAGNOSIS — E78.5 HYPERLIPIDEMIA LDL GOAL <100: ICD-10-CM

## 2025-05-13 DIAGNOSIS — R60.0 BILATERAL EDEMA OF LOWER EXTREMITY: ICD-10-CM

## 2025-05-13 DIAGNOSIS — E11.9 TYPE 2 DIABETES MELLITUS WITHOUT COMPLICATION, WITHOUT LONG-TERM CURRENT USE OF INSULIN (H): ICD-10-CM

## 2025-05-13 DIAGNOSIS — G43.019 INTRACTABLE MIGRAINE WITHOUT AURA AND WITHOUT STATUS MIGRAINOSUS: ICD-10-CM

## 2025-05-13 DIAGNOSIS — Z00.00 ENCOUNTER FOR MEDICARE ANNUAL WELLNESS EXAM: Primary | ICD-10-CM

## 2025-05-13 DIAGNOSIS — Z79.899 CONTROLLED SUBSTANCE AGREEMENT SIGNED: ICD-10-CM

## 2025-05-13 DIAGNOSIS — F11.20 CONTINUOUS OPIOID DEPENDENCE (H): ICD-10-CM

## 2025-05-13 DIAGNOSIS — F51.01 PRIMARY INSOMNIA: ICD-10-CM

## 2025-05-13 DIAGNOSIS — M25.562 LEFT KNEE PAIN, UNSPECIFIED CHRONICITY: ICD-10-CM

## 2025-05-13 DIAGNOSIS — M79.7 FIBROMYALGIA: ICD-10-CM

## 2025-05-13 LAB
ERYTHROCYTE [DISTWIDTH] IN BLOOD BY AUTOMATED COUNT: 12.9 % (ref 10–15)
EST. AVERAGE GLUCOSE BLD GHB EST-MCNC: 157 MG/DL
HBA1C MFR BLD: 7.1 % (ref 0–5.6)
HCT VFR BLD AUTO: 44.4 % (ref 35–47)
HGB BLD-MCNC: 15.3 G/DL (ref 11.7–15.7)
MCH RBC QN AUTO: 33.9 PG (ref 26.5–33)
MCHC RBC AUTO-ENTMCNC: 34.5 G/DL (ref 31.5–36.5)
MCV RBC AUTO: 98 FL (ref 78–100)
PLATELET # BLD AUTO: 262 10E3/UL (ref 150–450)
RBC # BLD AUTO: 4.51 10E6/UL (ref 3.8–5.2)
WBC # BLD AUTO: 9.5 10E3/UL (ref 4–11)

## 2025-05-13 PROCEDURE — 3078F DIAST BP <80 MM HG: CPT | Performed by: INTERNAL MEDICINE

## 2025-05-13 PROCEDURE — 85027 COMPLETE CBC AUTOMATED: CPT | Performed by: INTERNAL MEDICINE

## 2025-05-13 PROCEDURE — 82043 UR ALBUMIN QUANTITATIVE: CPT | Performed by: INTERNAL MEDICINE

## 2025-05-13 PROCEDURE — 99214 OFFICE O/P EST MOD 30 MIN: CPT | Mod: 25 | Performed by: INTERNAL MEDICINE

## 2025-05-13 PROCEDURE — 80061 LIPID PANEL: CPT | Performed by: INTERNAL MEDICINE

## 2025-05-13 PROCEDURE — 3074F SYST BP LT 130 MM HG: CPT | Performed by: INTERNAL MEDICINE

## 2025-05-13 PROCEDURE — 80053 COMPREHEN METABOLIC PANEL: CPT | Performed by: INTERNAL MEDICINE

## 2025-05-13 PROCEDURE — 82570 ASSAY OF URINE CREATININE: CPT | Performed by: INTERNAL MEDICINE

## 2025-05-13 PROCEDURE — 36415 COLL VENOUS BLD VENIPUNCTURE: CPT | Performed by: INTERNAL MEDICINE

## 2025-05-13 PROCEDURE — G0439 PPPS, SUBSEQ VISIT: HCPCS | Performed by: INTERNAL MEDICINE

## 2025-05-13 PROCEDURE — 84443 ASSAY THYROID STIM HORMONE: CPT | Performed by: INTERNAL MEDICINE

## 2025-05-13 PROCEDURE — 83036 HEMOGLOBIN GLYCOSYLATED A1C: CPT | Performed by: INTERNAL MEDICINE

## 2025-05-13 RX ORDER — TRAMADOL HYDROCHLORIDE 50 MG/1
TABLET ORAL
Qty: 43 TABLET | Refills: 5 | Status: SHIPPED | OUTPATIENT
Start: 2025-05-13

## 2025-05-13 RX ORDER — CARTILAGE/COLLAGEN/BOR/HYALUR 40-10-5 MG
1 TABLET ORAL DAILY
COMMUNITY
Start: 2025-05-13

## 2025-05-13 RX ORDER — TRAZODONE HYDROCHLORIDE 50 MG/1
50 TABLET ORAL AT BEDTIME
Qty: 90 TABLET | Refills: 3 | Status: SHIPPED | OUTPATIENT
Start: 2025-05-13

## 2025-05-13 RX ORDER — HYDROCHLOROTHIAZIDE 50 MG/1
50 TABLET ORAL
Qty: 90 TABLET | Refills: 3 | Status: SHIPPED | OUTPATIENT
Start: 2025-05-13

## 2025-05-13 RX ORDER — BUTALBITAL, ASPIRIN, AND CAFFEINE 325; 50; 40 MG/1; MG/1; MG/1
1 CAPSULE ORAL EVERY 4 HOURS PRN
Qty: 10 CAPSULE | Refills: 0 | Status: SHIPPED | OUTPATIENT
Start: 2025-05-13

## 2025-05-13 RX ORDER — PRAVASTATIN SODIUM 10 MG
10 TABLET ORAL DAILY
Qty: 100 TABLET | Refills: 2 | Status: SHIPPED | OUTPATIENT
Start: 2025-05-13

## 2025-05-13 RX ORDER — TOPIRAMATE 25 MG/1
TABLET, FILM COATED ORAL
Qty: 400 TABLET | Refills: 3 | Status: SHIPPED | OUTPATIENT
Start: 2025-05-13

## 2025-05-13 SDOH — HEALTH STABILITY: PHYSICAL HEALTH: ON AVERAGE, HOW MANY DAYS PER WEEK DO YOU ENGAGE IN MODERATE TO STRENUOUS EXERCISE (LIKE A BRISK WALK)?: 3 DAYS

## 2025-05-13 ASSESSMENT — SOCIAL DETERMINANTS OF HEALTH (SDOH): HOW OFTEN DO YOU GET TOGETHER WITH FRIENDS OR RELATIVES?: THREE TIMES A WEEK

## 2025-05-13 NOTE — PATIENT INSTRUCTIONS
Patient Education   Preventive Care Advice   This is general advice given by our system to help you stay healthy. However, your care team may have specific advice just for you. Please talk to your care team about your preventive care needs.  Nutrition  Eat 5 or more servings of fruits and vegetables each day.  Try wheat bread, brown rice and whole grain pasta (instead of white bread, rice, and pasta).  Get enough calcium and vitamin D. Check the label on foods and aim for 100% of the RDA (recommended daily allowance).  Lifestyle  Exercise at least 150 minutes each week  (30 minutes a day, 5 days a week).  Do muscle strengthening activities 2 days a week. These help control your weight and prevent disease.  No smoking.  Wear sunscreen to prevent skin cancer.  Have a dental exam and cleaning every 6 months.  Yearly exams  See your health care team every year to talk about:  Any changes in your health.  Any medicines your care team has prescribed.  Preventive care, family planning, and ways to prevent chronic diseases.  Shots (vaccines)   HPV shots (up to age 26), if you've never had them before.  Hepatitis B shots (up to age 59), if you've never had them before.  COVID-19 shot: Get this shot when it's due.  Flu shot: Get a flu shot every year.  Tetanus shot: Get a tetanus shot every 10 years.  Pneumococcal, hepatitis A, and RSV shots: Ask your care team if you need these based on your risk.  Shingles shot (for age 50 and up)  General health tests  Diabetes screening:  Starting at age 35, Get screened for diabetes at least every 3 years.  If you are younger than age 35, ask your care team if you should be screened for diabetes.  Cholesterol test: At age 39, start having a cholesterol test every 5 years, or more often if advised.  Bone density scan (DEXA): At age 50, ask your care team if you should have this scan for osteoporosis (brittle bones).  Hepatitis C: Get tested at least once in your life.  STIs (sexually  transmitted infections)  Before age 24: Ask your care team if you should be screened for STIs.  After age 24: Get screened for STIs if you're at risk. You are at risk for STIs (including HIV) if:  You are sexually active with more than one person.  You don't use condoms every time.  You or a partner was diagnosed with a sexually transmitted infection.  If you are at risk for HIV, ask about PrEP medicine to prevent HIV.  Get tested for HIV at least once in your life, whether you are at risk for HIV or not.  Cancer screening tests  Cervical cancer screening: If you have a cervix, begin getting regular cervical cancer screening tests starting at age 21.  Breast cancer scan (mammogram): If you've ever had breasts, begin having regular mammograms starting at age 40. This is a scan to check for breast cancer.  Colon cancer screening: It is important to start screening for colon cancer at age 45.  Have a colonoscopy test every 10 years (or more often if you're at risk) Or, ask your provider about stool tests like a FIT test every year or Cologuard test every 3 years.  To learn more about your testing options, visit:   .  For help making a decision, visit:   https://bit.ly/qk95174.  Prostate cancer screening test: If you have a prostate, ask your care team if a prostate cancer screening test (PSA) at age 55 is right for you.  Lung cancer screening: If you are a current or former smoker ages 50 to 80, ask your care team if ongoing lung cancer screenings are right for you.  For informational purposes only. Not to replace the advice of your health care provider. Copyright   2023 [facility] [employer]. All rights reserved. Clinically reviewed by the [facility]. [email or web address] [id number] - REV 01/24.  Hearing Loss: Care Instructions  Overview     Hearing loss is a sudden or slow decrease in how well you hear. It can range from slight to profound. Permanent hearing loss can occur with aging. It also can  happen when you are exposed long-term to loud noise. Examples include listening to loud music, riding motorcycles, or being around other loud machines.  Hearing loss can affect your work and home life. It can make you feel lonely or depressed. You may feel that you have lost your independence. But hearing aids and other devices can help you hear better and feel connected to others.  Follow-up care is a key part of your treatment and safety. Be sure to make and go to all appointments, and call your doctor if you are having problems. It's also a good idea to know your test results and keep a list of the medicines you take.  How can you care for yourself at home?  Avoid loud noises whenever possible. This helps keep your hearing from getting worse.  Always wear hearing protection around loud noises.  Wear a hearing aid as directed.  A professional can help you pick a hearing aid that will work best for you.  You can also get hearing aids over the counter for mild to moderate hearing loss.  Have hearing tests as your doctor suggests. They can show whether your hearing has changed. Your hearing aid may need to be adjusted.  Use other devices as needed. These may include:  Telephone amplifiers and hearing aids that can connect to a television, stereo, radio, or microphone.  Devices that use lights or vibrations. These alert you to the doorbell, a ringing telephone, or a baby monitor.  Television closed-captioning. This shows the words at the bottom of the screen. Most new TVs can do this.  TTY (text telephone). This lets you type messages back and forth on the telephone instead of talking or listening. These devices are also called TDD. When messages are typed on the keyboard, they are sent over the phone line to a receiving TTY. The message is shown on a monitor.  Use text messaging, social media, and email if it is hard for you to communicate by telephone.  Try to learn a listening technique called speechreading. It is  "not lipreading. You pay attention to people's gestures, expressions, posture, and tone of voice. These clues can help you understand what a person is saying. Face the person you are talking to, and have them face you. Make sure the lighting is good. You need to see the other person's face clearly.  Think about counseling if you need help to adjust to your hearing loss.  When should you call for help?  Watch closely for changes in your health, and be sure to contact your doctor if:    You think your hearing is getting worse.     You have new symptoms, such as dizziness or nausea.   Where can you learn more?  Go to https://www.[employer].Optoro/patiented  Enter R798 in the search box to learn more about \"Hearing Loss: Care Instructions.\"  Current as of: October 27, 2024  Content Version: 14.4    3664-0547 [employer].   Care instructions adapted under license by your healthcare professional. If you have questions about a medical condition or this instruction, always ask your healthcare professional. [employer] disclaims any warranty or liability for your use of this information.    Learning About Stress  What is stress?     Stress is your body's response to a hard situation. Your body can have a physical, emotional, or mental response. Stress is a fact of life for most people, and it affects everyone differently. What causes stress for you may not be stressful for someone else.  A lot of things can cause stress. You may feel stress when you go on a job interview, take a test, or run a race. This kind of short-term stress is normal and even useful. It can help you if you need to work hard or react quickly. For example, stress can help you finish an important job on time.  Long-term stress is caused by ongoing stressful situations or events. Examples of long-term stress include long-term health problems, ongoing problems at work, or conflicts in your family. Long-term stress can harm your " health.  How does stress affect your health?  When you are stressed, your body responds as though you are in danger. It makes hormones that speed up your heart, make you breathe faster, and give you a burst of energy. This is called the fight-or-flight stress response. If the stress is over quickly, your body goes back to normal and no harm is done.  But if stress happens too often or lasts too long, it can have bad effects. Long-term stress can make you more likely to get sick, and it can make symptoms of some diseases worse. If you tense up when you are stressed, you may develop neck, shoulder, or low back pain. Stress is linked to high blood pressure and heart disease.  Stress also harms your emotional health. It can make you caro, tense, or depressed. Your relationships may suffer, and you may not do well at work or school.  What can you do to manage stress?  You can try these things to help manage stress:   Do something active. Exercise or activity can help reduce stress. Walking is a great way to get started. Even everyday activities such as housecleaning or yard work can help.  Try yoga or cr chi. These techniques combine exercise and meditation. You may need some training at first to learn them.  Do something you enjoy. For example, listen to music or go to a movie. Practice your hobby or do volunteer work.  Meditate. This can help you relax, because you are not worrying about what happened before or what may happen in the future.  Do guided imagery. Imagine yourself in any setting that helps you feel calm. You can use online videos, books, or a teacher to guide you.  Do breathing exercises. For example:  From a standing position, bend forward from the waist with your knees slightly bent. Let your arms dangle close to the floor.  Breathe in slowly and deeply as you return to a standing position. Roll up slowly and lift your head last.  Hold your breath for just a few seconds in the standing  "position.  Breathe out slowly and bend forward from the waist.  Let your feelings out. Talk, laugh, cry, and express anger when you need to. Talking with supportive friends or family, a counselor, or a krystian leader about your feelings is a healthy way to relieve stress. Avoid discussing your feelings with people who make you feel worse.  Write. It may help to write about things that are bothering you. This helps you find out how much stress you feel and what is causing it. When you know this, you can find better ways to cope.  What can you do to prevent stress?  You might try some of these things to help prevent stress:  Manage your time. This helps you find time to do the things you want and need to do.  Get enough sleep. Your body recovers from the stresses of the day while you are sleeping.  Get support. Your family, friends, and community can make a difference in how you experience stress.  Limit your news feed. Avoid or limit time on social media or news that may make you feel stressed.  Do something active. Exercise or activity can help reduce stress. Walking is a great way to get started.  Where can you learn more?  Go to https://www.[employer].net/patiented  Enter N032 in the search box to learn more about \"Learning About Stress.\"  Current as of: October 24, 2024  Content Version: 14.4 2024-2025 [employer].   Care instructions adapted under license by your healthcare professional. If you have questions about a medical condition or this instruction, always ask your healthcare professional. [employer] disclaims any warranty or liability for your use of this information.    Learning About Sleeping Well  What does sleeping well mean?     Sleeping well means getting enough sleep to feel good and stay healthy. How much sleep is enough varies among people.  The number of hours you sleep and how you feel when you wake up are both important. If you do not feel refreshed, you probably need " "more sleep. Another sign of not getting enough sleep is feeling tired during the day.  Experts recommend that adults get at least 7 or more hours of sleep per day. Children and older adults need more sleep.  Why is getting enough sleep important?  Getting enough quality sleep is a basic part of good health. When your sleep suffers, your physical health, mood, and your thoughts can suffer too. You may find yourself feeling more grumpy or stressed. Not getting enough sleep also can lead to serious problems, including injury, accidents, anxiety, and depression.  What might cause poor sleeping?  Many things can cause sleep problems, including:  Changes to your sleep schedule.  Stress. Stress can be caused by fear about a single event, such as giving a speech. Or you may have ongoing stress, such as worry about work or school.  Depression, anxiety, and other mental or emotional conditions.  Changes in your sleep habits or surroundings. This includes changes that happen where you sleep, such as noise, light, or sleeping in a different bed. It also includes changes in your sleep pattern, such as having jet lag or working a late shift.  Health problems, such as pain, breathing problems, and restless legs syndrome.  Lack of regular exercise.  Using alcohol, nicotine, or caffeine before bed.  How can you help yourself?  Here are some tips that may help you sleep more soundly and wake up feeling more refreshed.  Your sleeping area   Use your bedroom only for sleeping and sex. A bit of light reading may help you fall asleep. But if it doesn't, do your reading elsewhere in the house. Try not to use your TV, computer, smartphone, or tablet while you are in bed.  Be sure your bed is big enough to stretch out comfortably, especially if you have a sleep partner.  Keep your bedroom quiet, dark, and cool. Use curtains, blinds, or a sleep mask to block out light. To block out noise, use earplugs, soothing music, or a \"white noise\" " "machine.  Your evening and bedtime routine   Create a relaxing bedtime routine. You might want to take a warm shower or bath, or listen to soothing music.  Go to bed at the same time every night. And get up at the same time every morning, even if you feel tired.  What to avoid   Limit caffeine (coffee, tea, caffeinated sodas) during the day, and don't have any for at least 6 hours before bedtime.  Avoid drinking alcohol before bedtime. Alcohol can cause you to wake up more often during the night.  Try not to smoke or use tobacco, especially in the evening. Nicotine can keep you awake.  Limit naps during the day, especially close to bedtime.  Avoid lying in bed awake for too long. If you can't fall asleep or if you wake up in the middle of the night and can't get back to sleep within about 20 minutes, get out of bed and go to another room until you feel sleepy.  Avoid taking medicine right before bed that may keep you awake or make you feel hyper or energized. Your doctor can tell you if your medicine may do this and if you can take it earlier in the day.  If you can't sleep   Imagine yourself in a peaceful, pleasant scene. Focus on the details and feelings of being in a place that is relaxing.  Get up and do a quiet or boring activity until you feel sleepy.  Avoid drinking any liquids before going to bed to help prevent waking up often to use the bathroom.  Where can you learn more?  Go to https://www.[employer].net/patiented  Enter J942 in the search box to learn more about \"Learning About Sleeping Well.\"  Current as of: July 31, 2024  Content Version: 14.4    5593-2787 [employer].   Care instructions adapted under license by your healthcare professional. If you have questions about a medical condition or this instruction, always ask your healthcare professional. [employer] disclaims any warranty or liability for your use of this information.    Bladder Training: Care Instructions  Your Care " Instructions     Bladder training is used to treat urge incontinence and stress incontinence. Urge incontinence means that the need to urinate comes on so fast that you can't get to a toilet in time. Stress incontinence means that you leak urine because of pressure on your bladder. For example, it may happen when you laugh, cough, or lift something heavy.  Bladder training can increase how long you can wait before you have to urinate. It can also help your bladder hold more urine. And it can give you better control over the urge to urinate.  It is important to remember that bladder training takes a few weeks to a few months to make a difference. You may not see results right away, but don't give up.  Follow-up care is a key part of your treatment and safety. Be sure to make and go to all appointments, and call your doctor if you are having problems. It's also a good idea to know your test results and keep a list of the medicines you take.  How can you care for yourself at home?  Work with your doctor to come up with a bladder training program that is right for you. You may use one or more of the following methods.  Delayed urination  In the beginning, try to keep from urinating for 5 minutes after you first feel the need to go.  While you wait, take deep, slow breaths to relax. Kegel exercises can also help you delay the need to go to the bathroom.  After some practice, when you can easily wait 5 minutes to urinate, try to wait 10 minutes before you urinate.  Slowly increase the waiting period until you are able to control when you have to urinate.  Scheduled urination  Empty your bladder when you first wake up in the morning.  Schedule times throughout the day when you will urinate.  Start by going to the bathroom every hour, even if you don't need to go.  Slowly increase the time between trips to the bathroom.  When you have found a schedule that works well for you, keep doing it.  If you wake up during the night  "and have to urinate, do it. Apply your schedule to waking hours only.  Kegel exercises  These tighten and strengthen pelvic muscles, which can help you control the flow of urine. (If doing these exercises causes pain, stop doing them and talk with your doctor.) To do Kegel exercises:  Squeeze your muscles as if you were trying not to pass gas. Or squeeze your muscles as if you were stopping the flow of urine. Your belly, legs, and buttocks shouldn't move.  Hold the squeeze for 3 seconds, then relax for 5 to 10 seconds.  Start with 3 seconds, then add 1 second each week until you are able to squeeze for 10 seconds.  Repeat the exercise 10 times a session. Do 3 to 8 sessions a day.  When should you call for help?  Watch closely for changes in your health, and be sure to contact your doctor if:    Your incontinence is getting worse.     You do not get better as expected.   Where can you learn more?  Go to https://www.[employer].net/patiented  Enter V684 in the search box to learn more about \"Bladder Training: Care Instructions.\"  Current as of: April 30, 2024  Content Version: 14.4    5216-1715 [employer].   Care instructions adapted under license by your healthcare professional. If you have questions about a medical condition or this instruction, always ask your healthcare professional. [employer] disclaims any warranty or liability for your use of this information.    Chronic Pain: Care Instructions  Your Care Instructions     Chronic pain is pain that lasts a long time (months or even years) and may or may not have a clear cause. It is different from acute pain, which usually does have a clear cause--like an injury or illness--and gets better over time. Chronic pain:  Lasts over time but may vary from day to day.  Does not go away despite efforts to end it.  May disrupt your sleep and lead to fatigue.  May cause depression or anxiety.  May make your muscles tense, causing more pain.  Can " disrupt your work, hobbies, home life, and relationships with friends and family.  Chronic pain is a very real condition. It is not just in your head. Treatment can help and usually includes several methods used together, such as medicines, physical therapy, exercise, and other treatments. Learning how to relax and changing negative thought patterns can also help you cope.  Chronic pain is complex. Taking an active role in your treatment will help you better manage your pain. Tell your doctor if you have trouble dealing with your pain. You may have to try several things before you find what works best for you.  Follow-up care is a key part of your treatment and safety. Be sure to make and go to all appointments, and call your doctor if you are having problems. It's also a good idea to know your test results and keep a list of the medicines you take.  How can you care for yourself at home?  Pace yourself. Break up large jobs into smaller tasks. Save harder tasks for days when you have less pain, or go back and forth between hard tasks and easier ones. Take rest breaks.  Relax, and reduce stress. Relaxation techniques such as deep breathing or meditation can help.  Keep moving. Gentle, daily exercise can help reduce pain over the long run. Try low- or no-impact exercises such as walking, swimming, and stationary biking. Do stretches to stay flexible.  Try heat, cold packs, and massage.  Get enough sleep. Chronic pain can make you tired and drain your energy. Talk with your doctor if you have trouble sleeping because of pain.  Think positive. Your thoughts can affect your pain level. Do things that you enjoy to distract yourself when you have pain instead of focusing on the pain. See a movie, read a book, listen to music, or spend time with a friend.  If you think you are depressed, talk to your doctor about treatment.  Keep a daily pain diary. Record how your moods, thoughts, sleep patterns, activities, and medicine  affect your pain. You may find that your pain is worse during or after certain activities or when you are feeling a certain emotion. Having a record of your pain can help you and your doctor find the best ways to treat your pain.  Take pain medicines exactly as directed.  If the doctor gave you a prescription medicine for pain, take it as prescribed.  If you are not taking a prescription pain medicine, ask your doctor if you can take an over-the-counter medicine.  Reducing constipation caused by pain medicine  Talk to your doctor about a laxative. If a laxative doesn't work, your doctor may suggest a prescription medicine.  Include fruits, vegetables, beans, and whole grains in your diet each day. These foods are high in fiber.  If your doctor recommends it, get more exercise. Walking is a good choice. Bit by bit, increase the amount you walk every day. Try for at least 30 minutes on most days of the week.  Schedule time each day for a bowel movement. A daily routine may help. Take your time and do not strain when having a bowel movement.  When should you call for help?   Call your doctor now or seek immediate medical care if:    Your pain gets worse or is out of control.     You feel down or blue, or you do not enjoy things like you once did. You may be depressed, which is common in people with chronic pain. Depression can be treated.     You have vomiting or cramps for more than 2 hours.   Watch closely for changes in your health, and be sure to contact your doctor if:    You cannot sleep because of pain.     You are very worried or anxious about your pain.     You have trouble taking your pain medicine.     You have any concerns about your pain medicine.     You have trouble with bowel movements, such as:  No bowel movement in 3 days.  Blood in the anal area, in your stool, or on the toilet paper.  Diarrhea for more than 24 hours.   Where can you learn more?  Go to https://www.healthwise.net/patiented  Enter N004  "in the search box to learn more about \"Chronic Pain: Care Instructions.\"  Current as of: July 31, 2024  Content Version: 14.4    [id number] [employer].   Care instructions adapted under license by your healthcare professional. If you have questions about a medical condition or this instruction, always ask your healthcare professional. [employer] disclaims any warranty or liability for your use of this information.       Patient Instructions   Someone will contact you to help you to schedule:  Ophthalmology appointment  Sports Medicine appointment     We reviewed and updated an annual Controlled Subtance Agreement, and we can update your urine screen with all of the labs today.     Overall everything looks fine!    Refills of needed medications have been faxed to your pharmacy.     Lab results will be available soon on [employer].    See me in six months, sooner if problems.     "

## 2025-05-13 NOTE — LETTER

## 2025-05-13 NOTE — PROGRESS NOTES
Preventive Care Visit  [facility]  Eric Love MD, Internal Medicine  May 13, 2025      Assessment & Plan   (Z00.00) Encounter for Medicare annual wellness exam  (primary encounter diagnosis)  Comment: Stable health. See epic orders.     (G43.019) Intractable migraine without aura and without status migrainosus  Comment: Patient uses Fiorinal infrequently--refilled. Refilled topiramate for headache prophylaxis.   Plan: butalbital-aspirin-caffeine (FIORINAL)         -40 MG capsule, topiramate (TOPAMAX) 25         MG tablet, OFFICE/OUTPT VISIT,EST,LEVL IV          (M79.7) Fibromyalgia  Comment: Very chronic condition with longstanding adherence to use at #43 tabs/month.   Plan: traMADol (ULTRAM) 50 MG tablet, OFFICE/OUTPT         VISIT,EST,LEVL IV          (F11.20) Continuous opioid dependence (H)  (Z79.899) Controlled substance agreement signed--updated 5/13/2025  Comment: - Agreement signed for legal and patient safety reasons. Tramadol use is not at high risk due to low monthly intake. No taper plan needed.  - Take medications as prescribed. Avoid combining with alcohol or sedating medications to prevent drowsiness and respiratory issues. Urine drug test to be conducted today. Prescriptions to be obtained from one clinic only. Provide lead time before running out of medication. Five additional refills of tramadol provided.  - Risks and side effects: Risk of drowsiness and respiratory issues if combined with alcohol or sedating medications.  Plan: Drug Confirmation Panel Urine with Creatinine,         OFFICE/OUTPT VISIT,EST,LEVL IV    (F51.01) Primary insomnia  Comment: Stable on trazodone--refilled.   Plan: traZODone (DESYREL) 50 MG tablet, OFFICE/OUTPT         VISIT,EST,LEVL IV          (M25.562) Left knee pain, unspecified chronicity  Comment: Recent left knee pain, offered orthopedic referral.   Plan: Orthopedic  Referral, OFFICE/OUTPT         VISIT,EST,LEVL IV         "  (E11.9) Type 2 diabetes mellitus without complication, without long-term current use of insulin (H)  Comment: Update A1c--7.1 today. Offered eye referral.   Plan: Adult Eye  Referral, OFFICE/OUTPT         VISIT,EST,LEVL IV          (E78.5) Hyperlipidemia LDL goal <100  Comment: Continue current meds.   Plan: pravastatin (PRAVACHOL) 10 MG tablet,         OFFICE/OUTPT VISIT,EST,LEVL IV          (R60.0) Bilateral edema of lower extremity  Comment: Stable. Refill hydrochlorothiazide.   Plan: hydrochlorothiazide (HYDRODIURIL) 50 MG tablet,        OFFICE/OUTPT VISIT,EST,LEVL IV              Patient has been advised of split billing requirements and indicates understanding: Yes        BMI  Estimated body mass index is 26.54 kg/m  as calculated from the following:    Height as of this encounter: 1.607 m (5' 3.25\").    Weight as of this encounter: 68.5 kg (151 lb).       Counseling  Appropriate preventive services were addressed with this patient via screening, questionnaire, or discussion as appropriate for fall prevention, nutrition, physical activity, Tobacco-use cessation, social engagement, weight loss and cognition.  Checklist reviewing preventive services available has been given to the patient.  Reviewed patient's diet, addressing concerns and/or questions.   She is at risk for lack of exercise and has been provided with information to increase physical activity for the benefit of her well-being.   She is at risk for psychosocial distress and has been provided with information to reduce risk.   Discussed possible causes of fatigue. The patient was provided with written information regarding signs of hearing loss.   Information on urinary incontinence and treatment options given to patient.   I have reviewed Opioid Use Disorder and Substance Use Disorder risk factors and made any needed referrals.       Patient Instructions   Someone will contact you to help you to schedule:  Ophthalmology " appointment  Sports Medicine appointment     We reviewed and updated an annual Controlled Subtance Agreement, and we can update your urine screen with all of the labs today.     Overall everything looks fine!    Refills of needed medications have been faxed to your pharmacy.     Lab results will be available soon on [employer].    See me in six months, sooner if problems.             Naveen Sanchez is a 84 year old, presenting for the following:  Medicare Visit        5/13/2025     9:43 AM   Additional Questions   Roomed by Becky OSORIO  In addition to an Annual Wellness Exam, we addressed chronic neck/shoulders/upper back pain, migraines, diabetes mellitus.     We reviewed and updated a Controlled Substance Agreement today. She continues to take a tramadol tablet once each morning, and takes a second pill every Tuesday, Thursday and Saturday evening (dates on which she goes to the health club).      Despite her history of chronic musculoskeletal pain she is willing to continue taking low-dose pravastatin.      She takes Fiorinal infrequently for migraines. She takes Topamax daily for headache prevention.      She has a history of irritable bowel symptoms and takes imodium prn for diarrhea.      She reports taking hydrochlorothiazide for edema, not for hypertension.      - Has been taking tramadol, 43 per month, and Fiorinal, less than 10 per year, for a long time.  - Reports tiredness from packing and cleaning activities.  - Left knee pain since April 15, 2025, without swelling or buckling, but worsening over the past four weeks.  - Sleep issues, possibly related to fibromyalgia.  - Vision problems and uses hearing aids.  - No recent fevers or chills.             Advance Care Planning    Document on file is a Health Care Directive or POLST.        5/13/2025   General Health   How would you rate your overall physical health? Good   Feel stress (tense, anxious, or unable to sleep) To some extent   (!)  STRESS CONCERN      5/13/2025   Nutrition   Diet: Carbohydrate counting         5/13/2025   Exercise   Days per week of moderate/strenous exercise 3 days         5/13/2025   Social Factors   Frequency of gathering with friends or relatives Three times a week   Worry food won't last until get money to buy more No   Food not last or not have enough money for food? No   Do you have housing? (Housing is defined as stable permanent housing and does not include staying outside in a car, in a tent, in an abandoned building, in an overnight shelter, or couch-surfing.) No   Are you worried about losing your housing? No   Lack of transportation? No   Unable to get utilities (heat,electricity)? No   Want help with housing or utility concern? No   (!) HOUSING CONCERN PRESENT      5/13/2025   Fall Risk   Fallen 2 or more times in the past year? No   Trouble with walking or balance? No          5/13/2025   Activities of Daily Living- Home Safety   Needs help with the following daily activites None of the above   Safety concerns in the home None of the above         5/13/2025   Dental   Dentist two times every year? Yes         5/13/2025   Hearing Screening   Hearing concerns? (!) I FEEL THAT PEOPLE ARE MUMBLING OR NOT SPEAKING CLEARLY.    (!) I NEED TO ASK PEOPLE TO SPEAK UP OR REPEAT THEMSELVES.    (!) IT'S HARD TO FOLLOW A CONVERSATION IN A NOISY RESTAURANT OR CROWDED ROOM.    (!) TROUBLE FOLLOWING DIALOGUE IN THE THEATHER.    (!) TROUBLE UNDERSTANDING SOFT OR WHISPERED SPEECH.       Multiple values from one day are sorted in reverse-chronological order         5/13/2025   Driving Risk Screening   Patient/family members have concerns about driving No         5/13/2025   General Alertness/Fatigue Screening   Have you been more tired than usual lately? (!) YES         5/13/2025   Urinary Incontinence Screening   Bothered by leaking urine in past 6 months Yes         Today's PHQ-2 Score:       5/13/2025     9:14 AM   PHQ-2 ( 1999  [employer])   Q1: Little interest or pleasure in doing things 0   Q2: Feeling down, depressed or hopeless 0   PHQ-2 Score 0    Q1: Little interest or pleasure in doing things Not at all   Q2: Feeling down, depressed or hopeless Not at all   PHQ-2 Score 0       Patient-reported           2025   Substance Use   Alcohol more than 3/day or more than 7/wk No   Do you have a current opioid prescription? (!) YES   How severe/bad is pain from 1 to 10? 3/10   Do you use any other substances recreationally? No     Social History     Tobacco Use    Smoking status: Former     Current packs/day: 0.00     Types: Cigarettes     Quit date: 10/1/1981     Years since quittin.6     Passive exposure: Never    Smokeless tobacco: Never   Vaping Use    Vaping status: Never Used   Substance Use Topics    Alcohol use: Not Currently     Comment: social    Drug use: No           2023   LAST FHS-7 RESULTS   1st degree relative breast or ovarian cancer No   Any relative bilateral breast cancer No   Any male have breast cancer No   Any ONE woman have BOTH breast AND ovarian cancer No   Any woman with breast cancer before 50yrs No   2 or more relatives with breast AND/OR ovarian cancer No   2 or more relatives with breast AND/OR bowel cancer No        Mammogram Screening - After age 74- determine frequency with patient based on health status, life expectancy and patient goals            Reviewed and updated as needed this visit by Provider                    Past medical, family and social histories as well as medications reviewed and updated as needed.    Current providers sharing in care for this patient include:  Patient Care Team:  Eric Love MD as PCP - General (Internal Medicine)  Eric Love MD as Assigned PCP  Eric Love MD as Assigned Pain Medication Provider  Hillary Peoples RD as Diabetes Educator (Diabetes Education)    The following health maintenance items are reviewed in Epic and correct as of  "today:  Health Maintenance   Topic Date Due    DIABETIC FOOT EXAM  05/09/2024    A1C  02/18/2025    COVID-19 Vaccine (8 - 2024-25 season) 04/29/2025    BMP  05/10/2025    LIPID  05/10/2025    MICROALBUMIN  05/10/2025    URINE DRUG SCREEN  05/10/2025    MEDICARE ANNUAL WELLNESS VISIT  05/10/2025    EYE EXAM  06/01/2025    ANNUAL REVIEW OF HM ORDERS  11/20/2025    FALL RISK ASSESSMENT  05/13/2026    ADVANCE CARE PLANNING  05/24/2029    DTAP/TDAP/TD IMMUNIZATION (2 - Td or Tdap) 09/13/2032    DEXA  07/10/2037    PHQ-2 (once per calendar year)  Completed    INFLUENZA VACCINE  Completed    Pneumococcal Vaccine: 50+ Years  Completed    ZOSTER IMMUNIZATION  Completed    RSV VACCINE  Completed    HPV IMMUNIZATION  Aged Out    MENINGITIS IMMUNIZATION  Aged Out    COLORECTAL CANCER SCREENING  Discontinued       REVIEW OF SYSTEMS: The following systems have been completely reviewed and are negative except as noted above:   Constitutional, HEENT, respiratory, cardiovascular, gastrointestinal, genitourinary, musculoskeletal, dermatologic, hematologic, endocrine, psychiatric, and neurologic systems.       Objective    Exam  /62 (BP Location: Right arm, Patient Position: Sitting, Cuff Size: Adult Regular)   Pulse 81   Temp 98.3  F (36.8  C) (Tympanic)   Resp 16   Ht 1.607 m (5' 3.25\")   Wt 68.5 kg (151 lb)   LMP  (LMP Unknown)   SpO2 94%   Breastfeeding No   BMI 26.54 kg/m     Estimated body mass index is 26.54 kg/m  as calculated from the following:    Height as of this encounter: 1.607 m (5' 3.25\").    Weight as of this encounter: 68.5 kg (151 lb).    Physical Exam  GENERAL: healthy, alert and no distress  EYES: Eyes grossly normal to inspection, PERRL and conjunctivae and sclerae normal  HENT: ear canals and TM's normal, nose and mouth without ulcers or lesions  NECK: no adenopathy, no asymmetry, masses, or scars and thyroid normal to palpation  RESP: lungs clear to auscultation - no rales, rhonchi or " wheezes  BREAST/PELVIC: exams not performed.  CV: regular rate and rhythm, normal S1 S2, no S3 or S4, no murmur, click or rub, no peripheral edema and peripheral pulses strong  ABDOMEN: soft, nontender, no hepatosplenomegaly, no masses and bowel sounds normal  MS: no gross musculoskeletal defects noted, no edema  SKIN: no suspicious lesions or rashes  NEURO: Normal strength and tone, mentation intact and speech normal  PSYCH: mentation appears normal, affect normal/bright          5/13/2025   Mini Cog   Clock Draw Score 2 Normal   3 Item Recall 3 objects recalled   Mini Cog Total Score 5              Signed Electronically by: Eric Love MD, MD

## 2025-05-14 ENCOUNTER — PATIENT OUTREACH (OUTPATIENT)
Dept: CARE COORDINATION | Facility: CLINIC | Age: 85
End: 2025-05-14
Payer: COMMERCIAL

## 2025-05-14 LAB
ALBUMIN SERPL BCG-MCNC: 4.4 G/DL (ref 3.5–5.2)
ALP SERPL-CCNC: 103 U/L (ref 40–150)
ALT SERPL W P-5'-P-CCNC: 46 U/L (ref 0–50)
ANION GAP SERPL CALCULATED.3IONS-SCNC: 11 MMOL/L (ref 7–15)
AST SERPL W P-5'-P-CCNC: 43 U/L (ref 0–45)
BILIRUB SERPL-MCNC: 0.4 MG/DL
BUN SERPL-MCNC: 15.3 MG/DL (ref 8–23)
CALCIUM SERPL-MCNC: 9.9 MG/DL (ref 8.8–10.4)
CHLORIDE SERPL-SCNC: 97 MMOL/L (ref 98–107)
CHOLEST SERPL-MCNC: 191 MG/DL
CREAT SERPL-MCNC: 0.72 MG/DL (ref 0.51–0.95)
CREAT UR-MCNC: 94.7 MG/DL
CREAT UR-MCNC: 95 MG/DL
EGFRCR SERPLBLD CKD-EPI 2021: 82 ML/MIN/1.73M2
FASTING STATUS PATIENT QL REPORTED: YES
FASTING STATUS PATIENT QL REPORTED: YES
GLUCOSE SERPL-MCNC: 147 MG/DL (ref 70–99)
HCO3 SERPL-SCNC: 31 MMOL/L (ref 22–29)
HDLC SERPL-MCNC: 75 MG/DL
LDLC SERPL CALC-MCNC: 86 MG/DL
MICROALBUMIN UR-MCNC: 25.3 MG/L
MICROALBUMIN/CREAT UR: 26.72 MG/G CR (ref 0–25)
NONHDLC SERPL-MCNC: 116 MG/DL
POTASSIUM SERPL-SCNC: 3.2 MMOL/L (ref 3.4–5.3)
PROT SERPL-MCNC: 7 G/DL (ref 6.4–8.3)
SODIUM SERPL-SCNC: 139 MMOL/L (ref 135–145)
TRIGL SERPL-MCNC: 150 MG/DL
TSH SERPL DL<=0.005 MIU/L-ACNC: 0.87 UIU/ML (ref 0.3–4.2)

## 2025-05-15 LAB
N-NORTRAMADOL/CREAT UR CFM: ABNORMAL NG/MG {CREAT}
O-NORTRAMADOL UR CFM-MCNC: 9920 NG/ML
TRAMADOL CTO UR CFM-MCNC: 3840 NG/ML
TRAMADOL/CREAT UR: 4042 NG/MG {CREAT}

## 2025-05-22 ENCOUNTER — ANCILLARY PROCEDURE (OUTPATIENT)
Dept: GENERAL RADIOLOGY | Facility: CLINIC | Age: 85
End: 2025-05-22
Attending: FAMILY MEDICINE
Payer: COMMERCIAL

## 2025-05-22 ENCOUNTER — OFFICE VISIT (OUTPATIENT)
Dept: ORTHOPEDICS | Facility: CLINIC | Age: 85
End: 2025-05-22
Attending: INTERNAL MEDICINE
Payer: COMMERCIAL

## 2025-05-22 DIAGNOSIS — M25.562 LEFT KNEE PAIN, UNSPECIFIED CHRONICITY: ICD-10-CM

## 2025-05-22 DIAGNOSIS — M17.12 PRIMARY OSTEOARTHRITIS OF LEFT KNEE: Primary | ICD-10-CM

## 2025-05-22 NOTE — PATIENT INSTRUCTIONS
1. Primary osteoarthritis of left knee    2. Left knee pain, unspecified chronicity      -Patient has acute left knee pain due to arthritis  -Patient has a history of reaction to previous cortisone injections and so would like to defer that at this time.  We also discussed potentially trying viscosupplementation injections in the future which she will consider  -I reviewed a previous notes with the allergy specialist in 2021 where she had testing of various cortisone formulations.  Patient did not react to any of the steroids that were administered.  They do recommend that she take an antihistamine prior to the procedure if she does have cortisone in the future.  -Patient states that she has pain with walking and weightbearing activities.  -Patient will start formal physical therapy and home exercise program to strengthen and stabilize the muscles of her legs and hips  -Patient will continue to follow-up with me for further treatment and recommendations.  -Call direct clinic number [[phone]] at any time with questions or concerns.    Albert Yeo MD [facility]  [facility]

## 2025-05-22 NOTE — PROGRESS NOTES
ASSESSMENT & PLAN  Patient Instructions     1. Primary osteoarthritis of left knee    2. Left knee pain, unspecified chronicity      -Patient has acute left knee pain due to arthritis  -Patient has a history of reaction to previous cortisone injections and so would like to defer that at this time.  We also discussed potentially trying viscosupplementation injections in the future which she will consider  -I reviewed a previous notes with the allergy specialist in 2021 where she had testing of various cortisone formulations.  Patient did not react to any of the steroids that were administered.  They do recommend that she take an antihistamine prior to the procedure if she does have cortisone in the future.  -Patient states that she has pain with walking and weightbearing activities.  -Patient will start formal physical therapy and home exercise program to strengthen and stabilize the muscles of her legs and hips  -Patient will continue to follow-up with me for further treatment and recommendations.  -Call direct clinic number [[phone]] at any time with questions or concerns.    Albert Yeo MD [facility]  [facility]        -----    SUBJECTIVE  Nubia Coronel is a/an 84 year old female who is seen in consultation at the request of  Eric Love M.D. for evaluation of left knee pain. The patient is seen by themselves.    Onset: 1 month(s) ago. Patient reports they were sitting for a long period of time and when they tried to get up off the floor they had immediate pain in their left knee.   Location of Pain: left anterior knee  Rating of Pain at worst: 8/10  Rating of Pain Currently: 8/10  Worsened by: Walking   Better with: Nothing   Treatments tried: rest/activity avoidance, elevation, ice, heat, and Ibuprofen  Associated symptoms: no distal numbness or tingling; denies swelling or warmth  Orthopedic history: NO  Relevant surgical history:  NO  Social history: social history: retired    Past Medical History:   Diagnosis Date    Fibromyalgia     Myofacial muscle pain     having myofacial tx     Osteoarthrosis, unspecified whether generalized or localized, other specified sites     Cervical FRANCISCO - Dr. Dalia Huynh    Prediabetes 2019     Social History     Socioeconomic History    Marital status:    Tobacco Use    Smoking status: Former     Current packs/day: 0.00     Types: Cigarettes     Quit date: 10/1/1981     Years since quittin.6     Passive exposure: Never    Smokeless tobacco: Never   Vaping Use    Vaping status: Never Used   Substance and Sexual Activity    Alcohol use: Not Currently     Comment: social    Drug use: No    Sexual activity: Not Currently     Partners: Male   Social History Narrative    , three sons.     Goes to club 2-3 times/week. 45 minutes each time.     In [place] each winter, January through April.     Nine grandchildren.    Eight great-grandchildren.      Social Drivers of Health     Financial Resource Strain: Low Risk  (2025)    Financial Resource Strain     Within the past 12 months, have you or your family members you live with been unable to get utilities (heat, electricity) when it was really needed?: No   Food Insecurity: Low Risk  (2025)    Food Insecurity     Within the past 12 months, did you worry that your food would run out before you got money to buy more?: No     Within the past 12 months, did the food you bought just not last and you didn t have money to get more?: No   Transportation Needs: Low Risk  (2025)    Transportation Needs     Within the past 12 months, has lack of transportation kept you from medical appointments, getting your medicines, non-medical meetings or appointments, work, or from getting things that you need?: No   Physical Activity: Unknown (2025)    Exercise Vital Sign     Days of Exercise per Week: 3 days   Stress: Stress Concern Present  (5/13/2025)    [employer] - Occupational Stress Questionnaire     Feeling of Stress : To some extent   Social Connections: Unknown (5/13/2025)    Social Connection and Isolation Panel [NHANES]     Frequency of Social Gatherings with Friends and Family: Three times a week   Interpersonal Safety: Low Risk  (5/13/2025)    Interpersonal Safety     Do you feel physically and emotionally safe where you currently live?: Yes     Within the past 12 months, have you been hit, slapped, kicked or otherwise physically hurt by someone?: No     Within the past 12 months, have you been humiliated or emotionally abused in other ways by your partner or ex-partner?: No   Housing Stability: High Risk (5/13/2025)    Housing Stability     Do you have housing? : No     Are you worried about losing your housing?: No         Patient's past medical, surgical, social, and family histories were reviewed today and no changes are noted.    REVIEW OF SYSTEMS:  10 point ROS is negative other than symptoms noted above in HPI, Past Medical History or as stated below  Constitutional: NEGATIVE for fever, chills, change in weight  Skin: NEGATIVE for worrisome rashes, moles or lesions  GI/: NEGATIVE for bowel or bladder changes  Neuro: NEGATIVE for weakness, dizziness or paresthesias    OBJECTIVE:  LMP  (LMP Unknown)    General: healthy, alert and in no distress  HEENT: no scleral icterus or conjunctival erythema  Skin: no suspicious lesions or rash. No jaundice.  CV: no pedal edema  Resp: normal respiratory effort without conversational dyspnea   Psych: normal mood and affect  Gait: normal steady gait with appropriate coordination and balance  Neuro: Normal light sensory exam of lower extremity  MSK:  LEFT KNEE  Inspection:    normal alignment  Palpation:   bony and ligamentous landmarks are nontender.    No effusion is present    Patellofemoral crepitus is Absent  Range of Motion:     00 extension to 1200  flexion  Strength:    Quadriceps grossly intact    Extensor mechanism intact  Special Tests:    Positive: none    Negative: MCL/valgus stress (0 & 30 deg), LCL/varus stress (0 & 30 deg), Lachman's, anterior drawer, posterior drawer, Eliezer's    Independent visualization of the below image:  Recent Results (from the past 24 hours)   XR Knee Standing AP Bilat [place] Bilat Lat Left    Narrative    Mild tricompartment joint space narrowing.  No acute fracture, dislocation   or significant osteophytes.             Albert Yeo MD CAQSM  [facility]

## 2025-05-22 NOTE — LETTER
5/22/2025      Nubia Coronel  [address] [facility] [address]      Dear Colleague,    Thank you for referring your patient, Nubia Coronel, to the [facility] [place]. Please see a copy of my visit note below.    ASSESSMENT & PLAN  Patient Instructions     1. Primary osteoarthritis of left knee    2. Left knee pain, unspecified chronicity      -Patient has acute left knee pain due to arthritis  -Patient has a history of reaction to previous cortisone injections and so would like to defer that at this time.  We also discussed potentially trying viscosupplementation injections in the future which she will consider  -States that she has pain with walking and weightbearing activities.  -Patient will start formal physical therapy and home exercise program to strengthen and stabilize the muscles of her legs and hips  -Patient will continue to follow-up with me for further treatment and recommendations.  -Call direct clinic number [[phone]] at any time with questions or concerns.    Albert Yeo MD [facility]  [facility] [place]        -----    SUBJECTIVE  Nubia Coronel is a/an 84 year old female who is seen in consultation at the request of  Eric Love M.D. for evaluation of left knee pain. The patient is seen by themselves.    Onset: 1 month(s) ago. Patient reports they were sitting for a long period of time and when they tried to get up off the floor they had immediate pain in their left knee.   Location of Pain: left anterior knee  Rating of Pain at worst: 8/10  Rating of Pain Currently: 8/10  Worsened by: Walking   Better with: Nothing   Treatments tried: rest/activity avoidance, elevation, ice, heat, and Ibuprofen  Associated symptoms: no distal numbness or tingling; denies swelling or warmth  Orthopedic history: NO  Relevant surgical history: NO  Social history: social history: retired    Past  Medical History:   Diagnosis Date     Fibromyalgia      Myofacial muscle pain     having myofacial tx      Osteoarthrosis, unspecified whether generalized or localized, other specified sites     Cervical FRANCISCO Huynh     Prediabetes 2019     Social History     Socioeconomic History     Marital status:    Tobacco Use     Smoking status: Former     Current packs/day: 0.00     Types: Cigarettes     Quit date: 10/1/1981     Years since quittin.6     Passive exposure: Never     Smokeless tobacco: Never   Vaping Use     Vaping status: Never Used   Substance and Sexual Activity     Alcohol use: Not Currently     Comment: social     Drug use: No     Sexual activity: Not Currently     Partners: Male   Social History Narrative    , three sons.     Goes to club 2-3 times/week. 45 minutes each time.     In [place] each winter, January through April.     Nine grandchildren.    Eight great-grandchildren.      Social Drivers of Health     Financial Resource Strain: Low Risk  (2025)    Financial Resource Strain      Within the past 12 months, have you or your family members you live with been unable to get utilities (heat, electricity) when it was really needed?: No   Food Insecurity: Low Risk  (2025)    Food Insecurity      Within the past 12 months, did you worry that your food would run out before you got money to buy more?: No      Within the past 12 months, did the food you bought just not last and you didn t have money to get more?: No   Transportation Needs: Low Risk  (2025)    Transportation Needs      Within the past 12 months, has lack of transportation kept you from medical appointments, getting your medicines, non-medical meetings or appointments, work, or from getting things that you need?: No   Physical Activity: Unknown (2025)    Exercise Vital Sign      Days of Exercise per Week: 3 days   Stress: Stress Concern Present (2025)    [employer] [place] [employer]  [employer] - Occupational Stress Questionnaire      Feeling of Stress : To some extent   Social Connections: Unknown (5/13/2025)    Social Connection and Isolation Panel [NHANES]      Frequency of Social Gatherings with Friends and Family: Three times a week   Interpersonal Safety: Low Risk  (5/13/2025)    Interpersonal Safety      Do you feel physically and emotionally safe where you currently live?: Yes      Within the past 12 months, have you been hit, slapped, kicked or otherwise physically hurt by someone?: No      Within the past 12 months, have you been humiliated or emotionally abused in other ways by your partner or ex-partner?: No   Housing Stability: High Risk (5/13/2025)    Housing Stability      Do you have housing? : No      Are you worried about losing your housing?: No         Patient's past medical, surgical, social, and family histories were reviewed today and no changes are noted.    REVIEW OF SYSTEMS:  10 point ROS is negative other than symptoms noted above in HPI, Past Medical History or as stated below  Constitutional: NEGATIVE for fever, chills, change in weight  Skin: NEGATIVE for worrisome rashes, moles or lesions  GI/: NEGATIVE for bowel or bladder changes  Neuro: NEGATIVE for weakness, dizziness or paresthesias    OBJECTIVE:  LMP  (LMP Unknown)    General: healthy, alert and in no distress  HEENT: no scleral icterus or conjunctival erythema  Skin: no suspicious lesions or rash. No jaundice.  CV: no pedal edema  Resp: normal respiratory effort without conversational dyspnea   Psych: normal mood and affect  Gait: normal steady gait with appropriate coordination and balance  Neuro: Normal light sensory exam of lower extremity  MSK:  LEFT KNEE  Inspection:    normal alignment  Palpation:   bony and ligamentous landmarks are nontender.    No effusion is present    Patellofemoral crepitus is Absent  Range of Motion:     00 extension to 1200 flexion  Strength:    Quadriceps grossly  intact    Extensor mechanism intact  Special Tests:    Positive: none    Negative: MCL/valgus stress (0 & 30 deg), LCL/varus stress (0 & 30 deg), Lachman's, anterior drawer, posterior drawer, Eliezer's    Independent visualization of the below image:  No results found for this or any previous visit (from the past 24 hours).          Albert Yeo MD [facility]    Again, thank you for allowing me to participate in the care of your patient.        Sincerely,        Albert Yeo, MD    Electronically signed

## 2025-05-28 DIAGNOSIS — G43.019 INTRACTABLE MIGRAINE WITHOUT AURA AND WITHOUT STATUS MIGRAINOSUS: ICD-10-CM

## 2025-05-28 RX ORDER — TOPIRAMATE 25 MG/1
TABLET, FILM COATED ORAL
Qty: 400 TABLET | Refills: 2 | Status: SHIPPED | OUTPATIENT
Start: 2025-05-28

## 2025-06-02 ENCOUNTER — THERAPY VISIT (OUTPATIENT)
Dept: PHYSICAL THERAPY | Facility: CLINIC | Age: 85
End: 2025-06-02
Payer: COMMERCIAL

## 2025-06-02 DIAGNOSIS — M17.12 PRIMARY OSTEOARTHRITIS OF LEFT KNEE: ICD-10-CM

## 2025-06-02 DIAGNOSIS — M25.562 LEFT KNEE PAIN, UNSPECIFIED CHRONICITY: ICD-10-CM

## 2025-06-02 PROCEDURE — 97161 PT EVAL LOW COMPLEX 20 MIN: CPT | Mod: GP | Performed by: PHYSICAL THERAPIST

## 2025-06-02 PROCEDURE — 97110 THERAPEUTIC EXERCISES: CPT | Mod: GP | Performed by: PHYSICAL THERAPIST

## 2025-06-02 ASSESSMENT — ACTIVITIES OF DAILY LIVING (ADL)
STIFFNESS: THE SYMPTOM AFFECTS MY ACTIVITY SLIGHTLY
PLEASE_INDICATE_YOR_PRIMARY_REASON_FOR_REFERRAL_TO_THERAPY:: KNEE
GIVING WAY, BUCKLING OR SHIFTING OF KNEE: I HAVE THE SYMPTOM BUT IT DOES NOT AFFECT MY ACTIVITY
KNEEL ON THE FRONT OF YOUR KNEE: ACTIVITY IS MINIMALLY DIFFICULT
HOW_WOULD_YOU_RATE_THE_CURRENT_FUNCTION_OF_YOUR_KNEE_DURING_YOUR_USUAL_DAILY_ACTIVITIES_ON_A_SCALE_FROM_0_TO_100_WITH_100_BEING_YOUR_LEVEL_OF_KNEE_FUNCTION_PRIOR_TO_YOUR_INJURY_AND_0_BEING_THE_INABILITY_TO_PERFORM_ANY_OF_YOUR_USUAL_DAILY_ACTIVITIES?: 30
HOW_WOULD_YOU_RATE_THE_OVERALL_FUNCTION_OF_YOUR_KNEE_DURING_YOUR_USUAL_DAILY_ACTIVITIES?: ABNORMAL
LIMPING: I HAVE THE SYMPTOM BUT IT DOES NOT AFFECT MY ACTIVITY
STAND: ACTIVITY IS NOT DIFFICULT
SWELLING: I DO NOT HAVE THE SYMPTOM
WEAKNESS: I DO NOT HAVE THE SYMPTOM
SWELLING: I DO NOT HAVE THE SYMPTOM
WEAKNESS: I DO NOT HAVE THE SYMPTOM
HOW_WOULD_YOU_RATE_THE_CURRENT_FUNCTION_OF_YOUR_KNEE_DURING_YOUR_USUAL_DAILY_ACTIVITIES_ON_A_SCALE_FROM_0_TO_100_WITH_100_BEING_YOUR_LEVEL_OF_KNEE_FUNCTION_PRIOR_TO_YOUR_INJURY_AND_0_BEING_THE_INABILITY_TO_PERFORM_ANY_OF_YOUR_USUAL_DAILY_ACTIVITIES?: 30
KNEE_ACTIVITY_OF_DAILY_LIVING_SUM: 51
GIVING WAY, BUCKLING OR SHIFTING OF KNEE: I HAVE THE SYMPTOM BUT IT DOES NOT AFFECT MY ACTIVITY
LIMPING: I HAVE THE SYMPTOM BUT IT DOES NOT AFFECT MY ACTIVITY
RISE FROM A CHAIR: ACTIVITY IS SOMEWHAT DIFFICULT
SQUAT: ACTIVITY IS VERY DIFFICULT
GO DOWN STAIRS: ACTIVITY IS SOMEWHAT DIFFICULT
RISE FROM A CHAIR: ACTIVITY IS SOMEWHAT DIFFICULT
KNEEL ON THE FRONT OF YOUR KNEE: ACTIVITY IS MINIMALLY DIFFICULT
PAIN: THE SYMPTOM AFFECTS MY ACTIVITY SLIGHTLY
PAIN: THE SYMPTOM AFFECTS MY ACTIVITY SLIGHTLY
RAW_SCORE: 51
STIFFNESS: THE SYMPTOM AFFECTS MY ACTIVITY SLIGHTLY
KNEE_ACTIVITY_OF_DAILY_LIVING_SCORE: 72.86
GO UP STAIRS: ACTIVITY IS SOMEWHAT DIFFICULT
WALK: ACTIVITY IS SOMEWHAT DIFFICULT
WALK: ACTIVITY IS SOMEWHAT DIFFICULT
STAND: ACTIVITY IS NOT DIFFICULT
SIT WITH YOUR KNEE BENT: ACTIVITY IS NOT DIFFICULT
HOW_WOULD_YOU_RATE_THE_OVERALL_FUNCTION_OF_YOUR_KNEE_DURING_YOUR_USUAL_DAILY_ACTIVITIES?: ABNORMAL
GO UP STAIRS: ACTIVITY IS SOMEWHAT DIFFICULT
GO DOWN STAIRS: ACTIVITY IS SOMEWHAT DIFFICULT
SQUAT: ACTIVITY IS VERY DIFFICULT
SIT WITH YOUR KNEE BENT: ACTIVITY IS NOT DIFFICULT

## 2025-06-02 NOTE — PROGRESS NOTES
PHYSICAL THERAPY EVALUATION  Type of Visit: Evaluation       Fall Risk Screen:  Have you fallen 2 or more times in the past year?: No  Have you fallen and had an injury in the past year?: No    Subjective      Condition type:  Initial onset (within last 3 months)  Cause of current episode:  Unspecified     Nature of treatment:  Rehabilitative  Functional ability:  Moderate functional limitations  Documented POC (choose all that apply):  Measurable short and long term/discharge treatment goals related to physical and functional deficits.;Frequency of treatment visits and treatment activities to address deficit areas.;Patient agrees to program participation including home program  Briefly describe symptoms:  L medial knee pain  How did the symptoms start:  prolonged sitting on floor  Average pain/intensity last 24 hours:  4/10  Average pain/intensity past week:  4/10  Frequency of Symptoms:  Frequently (51-75% of the time)  Symptom impact on ADLs:  Moderately  Condition change since eval:     General health reported by patient:  Good      Presenting condition or subjective complaint: dr yeo recommended it  Date of onset: 04/09/25    Relevant medical history:     Dates & types of surgery: Gall Bladder 1971Hysterectomy 1971    Prior diagnostic imaging/testing results: X-ray     Prior therapy history for the same diagnosis, illness or injury: No      Prior Level of Function  Transfers: Independent  Ambulation: Independent  ADL: Independent      Living Environment  Social support: With a significant other or spouse   Type of home: [facility]; 2-story   Stairs to enter the home: No       Ramp: No   Stairs inside the home: Yes 15 Is there a railing: Yes     Help at home: None  Equipment owned:       Employment: No    Hobbies/Interests: water aerobics golf geneology shopping    Patient goals for therapy: walk a a mile or two without pain    Patient reports she was sitting on the floor mid April 2025 for about an hour an went  "to get up with increased pain and unable to get up off the floor.   assisted patient up from the floor and took some Advil which helped but now her baselines are worsened with walking, stairs, squatting, after prolonged sitting.   Better with elevation and initially with heat.  Previous history 50 years ago hurt her knee hitting the coffee table.          Pain assessment:      Objective   KNEE EVALUATION  PAIN:   INTEGUMENTARY (edema, incisions):   POSTURE:   GAIT:  Weightbearing Status:   Assistive Device(s):   Gait Deviations:   BALANCE/PROPRIOCEPTION:   WEIGHTBEARING ALIGNMENT:   NON-WEIGHTBEARING ALIGNMENT:   ROM: 0-110 erp    STRENGTH: L quad=4/5, R quad=4+/5, L HS=5/5, R HS=5/5, L glut max=3+/5, R glut max=4-/5,   FLEXIBILITY: decreased HS bilat  SPECIAL TESTS:   FUNCTIONAL TESTS: Double Leg Squat: Anterior knee translation, Knee valgus, Hip internal rotation, and Improper use of glutes/hips  Single Leg Squat: Anterior knee translation, Knee valgus, Hip internal rotation, and Improper use of glutes/hips  SLS: <10\" on R, <10\" on L  PALPATION:   JOINT MOBILITY:     Assessment & Plan   CLINICAL IMPRESSIONS  Medical Diagnosis: Left knee pain, unspecified chronicity    Treatment Diagnosis: left knee pain with strength and mobility deficits   Impression/Assessment: Patient is a 84 year old female with L knee complaints.  The following significant findings have been identified: Pain, Decreased ROM/flexibility, Decreased strength, Impaired muscle performance, and Decreased activity tolerance. These impairments interfere with their ability to perform household chores and community mobility as compared to previous level of function.     Clinical Decision Making (Complexity):  Clinical Presentation: Stable/Uncomplicated  Clinical Presentation Rationale: based on medical and personal factors listed in PT evaluation  Clinical Decision Making (Complexity): Low complexity    PLAN OF CARE  Treatment " Interventions:  Interventions: Manual Therapy, Neuromuscular Re-education, Therapeutic Activity, Therapeutic Exercise    Long Term Goals     PT Goal 1  Goal Identifier: stairs  Goal Description: patient will ascend/descend stairs in a normal reciprocal pattern painfree  Rationale: to maximize safety and independence within the home;to maximize safety and independence within the community;to maximize safety and independence with performance of ADLs and functional tasks  Target Date: 07/28/25  PT Goal 2  Goal Identifier: ambulation  Goal Description: patient will be able to ambulate without AD or pain for functional distances.  Rationale: to maximize safety and independence with performance of ADLs and functional tasks;to maximize safety and independence within the home;to maximize safety and independence within the community  Target Date: 07/28/25      Frequency of Treatment: 1 x week  Duration of Treatment: 8 weeks    Recommended Referrals to Other Professionals:   Education Assessment:   Learner/Method: Patient;Pictures/Video;No Barriers to Learning    Risks and benefits of evaluation/treatment have been explained.   Patient/Family/caregiver agrees with Plan of Care.     Evaluation Time:     PT Eval, Low Complexity Minutes (63809): 20       Signing Clinician: Maricruz Ac, PT        [facility]                                                                                   OUTPATIENT PHYSICAL THERAPY      PLAN OF TREATMENT FOR OUTPATIENT REHABILITATION   Patient's Last Name, First Name, Nubia Prabhakar YOB: 1940   Provider's Name   [facility]   Medical Record No.  [id number]     Onset Date: 04/09/25  Start of Care Date: 06/02/25     Medical Diagnosis:  Left knee pain, unspecified chronicity      PT Treatment Diagnosis:  left knee pain with strength and mobility deficits Plan of Treatment  Frequency/Duration: 1 x week/ 8  weeks    Certification date from 06/02/25 to 07/28/25         See note for plan of treatment details and functional goals     Maricruz Ac, PT                         I CERTIFY THE NEED FOR THESE SERVICES FURNISHED UNDER        THIS PLAN OF TREATMENT AND WHILE UNDER MY CARE     (Physician attestation of this document indicates review and certification of the therapy plan).              Referring Provider:  Albert Yeo    Initial Assessment  See Epic Evaluation- Start of Care Date: 06/02/25

## 2025-06-09 ENCOUNTER — THERAPY VISIT (OUTPATIENT)
Dept: PHYSICAL THERAPY | Facility: CLINIC | Age: 85
End: 2025-06-09
Attending: FAMILY MEDICINE
Payer: COMMERCIAL

## 2025-06-09 DIAGNOSIS — M17.12 PRIMARY OSTEOARTHRITIS OF LEFT KNEE: Primary | ICD-10-CM

## 2025-06-09 DIAGNOSIS — M25.562 LEFT KNEE PAIN, UNSPECIFIED CHRONICITY: ICD-10-CM

## 2025-06-09 PROCEDURE — 97110 THERAPEUTIC EXERCISES: CPT | Mod: GP | Performed by: PHYSICAL THERAPIST

## 2025-06-19 ENCOUNTER — THERAPY VISIT (OUTPATIENT)
Dept: PHYSICAL THERAPY | Facility: CLINIC | Age: 85
End: 2025-06-19
Attending: FAMILY MEDICINE
Payer: COMMERCIAL

## 2025-06-19 DIAGNOSIS — M17.12 PRIMARY OSTEOARTHRITIS OF LEFT KNEE: ICD-10-CM

## 2025-06-19 DIAGNOSIS — M25.562 LEFT KNEE PAIN, UNSPECIFIED CHRONICITY: Primary | ICD-10-CM

## 2025-07-28 DIAGNOSIS — E78.5 HYPERLIPIDEMIA LDL GOAL <100: ICD-10-CM

## 2025-07-28 RX ORDER — PRAVASTATIN SODIUM 10 MG
10 TABLET ORAL DAILY
Qty: 90 TABLET | Refills: 1 | Status: SHIPPED | OUTPATIENT
Start: 2025-07-28

## 2025-08-16 ENCOUNTER — HEALTH MAINTENANCE LETTER (OUTPATIENT)
Age: 85
End: 2025-08-16

## (undated) DEVICE — ASSURANCE CLIP

## (undated) DEVICE — SPECIMEN CONTAINER 3OZ W/FORMALIN 59901

## (undated) DEVICE — GOWN IMPERVIOUS 2XL BLUE

## (undated) DEVICE — SNARE CAPIVATOR ROUND COLD SNR BX10 M00561101

## (undated) DEVICE — KIT ENDO TURNOVER/PROCEDURE CARRY-ON 101822

## (undated) DEVICE — SUCTION MANIFOLD NEPTUNE 2 SYS 1 PORT 702-025-000

## (undated) DEVICE — SOL WATER IRRIG 500ML BOTTLE 2F7113

## (undated) DEVICE — ENDO SNARE EXACTO COLD 9MM LOOP 2.4MMX230CM 00711115

## (undated) DEVICE — TUBING SUCTION MEDI-VAC 1/4"X20' N620A

## (undated) DEVICE — RX ELEVIEW SUBMUCOSAL ING 10ML AMP 05391530190015

## (undated) DEVICE — NDL SCLEROTHERAPY 25GA CARR-LOCK  00711811

## (undated) DEVICE — ENDO TRAP POLYP E-TRAP 00711099

## (undated) DEVICE — KIT ENDO FIRST STEP DISINFECTANT 200ML W/POUCH EP-4

## (undated) RX ORDER — BETAMETHASONE SODIUM PHOSPHATE AND BETAMETHASONE ACETATE 3; 3 MG/ML; MG/ML
INJECTION, SUSPENSION INTRA-ARTICULAR; INTRALESIONAL; INTRAMUSCULAR; SOFT TISSUE
Status: DISPENSED
Start: 2021-11-02

## (undated) RX ORDER — DEXAMETHASONE SODIUM PHOSPHATE 4 MG/ML
INJECTION, SOLUTION INTRA-ARTICULAR; INTRALESIONAL; INTRAMUSCULAR; INTRAVENOUS; SOFT TISSUE
Status: DISPENSED
Start: 2021-11-02

## (undated) RX ORDER — TRIAMCINOLONE ACETONIDE 40 MG/ML
INJECTION, SUSPENSION INTRA-ARTICULAR; INTRAMUSCULAR
Status: DISPENSED
Start: 2021-11-02